# Patient Record
Sex: FEMALE | Race: WHITE | NOT HISPANIC OR LATINO | Employment: OTHER | URBAN - METROPOLITAN AREA
[De-identification: names, ages, dates, MRNs, and addresses within clinical notes are randomized per-mention and may not be internally consistent; named-entity substitution may affect disease eponyms.]

---

## 2023-10-01 DIAGNOSIS — J30.9 ALLERGIC RHINITIS, UNSPECIFIED SEASONALITY, UNSPECIFIED TRIGGER: ICD-10-CM

## 2023-10-09 RX ORDER — CETIRIZINE HYDROCHLORIDE 10 MG/1
10 TABLET ORAL DAILY PRN
Qty: 30 TABLET | Refills: 0
Start: 2023-10-09 | End: 2023-10-20 | Stop reason: SDUPTHER

## 2023-10-10 ENCOUNTER — APPOINTMENT (OUTPATIENT)
Dept: CARDIOLOGY | Facility: CLINIC | Age: 48
End: 2023-10-10
Payer: COMMERCIAL

## 2023-10-10 ENCOUNTER — APPOINTMENT (OUTPATIENT)
Dept: ALLERGY | Facility: CLINIC | Age: 48
End: 2023-10-10

## 2023-10-16 PROBLEM — M77.9 TENDONITIS: Status: ACTIVE | Noted: 2023-10-16

## 2023-10-16 PROBLEM — R53.1 FEELING WEAK: Status: ACTIVE | Noted: 2023-10-16

## 2023-10-16 PROBLEM — R73.9 HYPERGLYCEMIA: Status: ACTIVE | Noted: 2023-10-16

## 2023-10-16 PROBLEM — M77.31 HEEL SPUR, RIGHT: Status: ACTIVE | Noted: 2023-10-16

## 2023-10-16 PROBLEM — M77.32 CALCANEAL SPUR OF LEFT FOOT: Status: ACTIVE | Noted: 2023-10-16

## 2023-10-16 PROBLEM — J45.50 SEVERE PERSISTENT ASTHMA WITHOUT COMPLICATION (MULTI): Status: ACTIVE | Noted: 2023-10-16

## 2023-10-16 PROBLEM — R55 VASOVAGAL SYMPTOM: Status: ACTIVE | Noted: 2023-10-16

## 2023-10-16 PROBLEM — L98.9 ABNORMALITY, SKIN: Status: ACTIVE | Noted: 2023-10-16

## 2023-10-16 PROBLEM — R19.7 DIARRHEA: Status: ACTIVE | Noted: 2023-10-16

## 2023-10-16 PROBLEM — J45.909 ASTHMA (HHS-HCC): Status: ACTIVE | Noted: 2023-10-16

## 2023-10-16 PROBLEM — R49.0 MUSCLE TENSION DYSPHONIA: Status: ACTIVE | Noted: 2023-10-16

## 2023-10-16 PROBLEM — M77.30 CALCANEAL SPUR: Status: ACTIVE | Noted: 2023-10-16

## 2023-10-16 PROBLEM — K57.32 DIVERTICULITIS OF COLON: Status: ACTIVE | Noted: 2023-10-16

## 2023-10-16 PROBLEM — R10.13 ABDOMINAL PAIN, EPIGASTRIC: Status: ACTIVE | Noted: 2023-10-16

## 2023-10-16 PROBLEM — J06.9: Status: ACTIVE | Noted: 2023-10-16

## 2023-10-16 PROBLEM — J38.3 VOCAL CORD DYSFUNCTION: Status: ACTIVE | Noted: 2023-10-16

## 2023-10-16 PROBLEM — M79.7 FIBROMYALGIA: Status: ACTIVE | Noted: 2023-10-16

## 2023-10-16 PROBLEM — K59.09 CHRONIC CONSTIPATION: Status: ACTIVE | Noted: 2023-10-16

## 2023-10-16 PROBLEM — G47.30 SLEEP APNEA: Status: ACTIVE | Noted: 2023-10-16

## 2023-10-16 PROBLEM — G47.00 INSOMNIA: Status: ACTIVE | Noted: 2023-10-16

## 2023-10-16 PROBLEM — R10.13 DYSPEPSIA: Status: ACTIVE | Noted: 2023-10-16

## 2023-10-16 PROBLEM — E16.2 HYPOGLYCEMIA: Status: ACTIVE | Noted: 2023-10-16

## 2023-10-16 PROBLEM — G93.32 CHRONIC FATIGUE SYNDROME: Status: ACTIVE | Noted: 2023-10-16

## 2023-10-16 PROBLEM — S93.409A ANKLE SPRAIN: Status: ACTIVE | Noted: 2023-10-16

## 2023-10-16 PROBLEM — K21.00 GASTROESOPHAGEAL REFLUX DISEASE WITH ESOPHAGITIS: Status: ACTIVE | Noted: 2023-10-16

## 2023-10-16 PROBLEM — H66.92 OTITIS MEDIA OF LEFT EAR: Status: ACTIVE | Noted: 2023-10-16

## 2023-10-16 PROBLEM — K57.30 SIGMOID DIVERTICULOSIS: Status: ACTIVE | Noted: 2023-10-16

## 2023-10-16 PROBLEM — J30.9 ALLERGIC RHINITIS: Status: ACTIVE | Noted: 2023-10-16

## 2023-10-16 PROBLEM — J38.7 IRRITABLE LARYNX: Status: ACTIVE | Noted: 2023-10-16

## 2023-10-16 PROBLEM — B96.89 BACTERIAL VAGINITIS: Status: ACTIVE | Noted: 2023-10-16

## 2023-10-16 PROBLEM — N76.0 BACTERIAL VAGINITIS: Status: ACTIVE | Noted: 2023-10-16

## 2023-10-16 PROBLEM — E66.3 OVERWEIGHT: Status: ACTIVE | Noted: 2023-10-16

## 2023-10-16 PROBLEM — E78.5 HYPERLIPIDEMIA: Status: ACTIVE | Noted: 2023-10-16

## 2023-10-16 PROBLEM — K21.9 GERD (GASTROESOPHAGEAL REFLUX DISEASE): Status: ACTIVE | Noted: 2023-10-16

## 2023-10-16 PROBLEM — R19.4 CHANGE IN BOWEL HABITS: Status: ACTIVE | Noted: 2023-10-16

## 2023-10-16 PROBLEM — K44.9 HIATAL HERNIA: Status: ACTIVE | Noted: 2023-10-16

## 2023-10-16 PROBLEM — E55.9 VITAMIN D DEFICIENCY: Status: ACTIVE | Noted: 2023-10-16

## 2023-10-16 PROBLEM — M19.90 ARTHRITIS: Status: ACTIVE | Noted: 2023-10-16

## 2023-10-16 PROBLEM — R07.89 ATYPICAL CHEST PAIN: Status: ACTIVE | Noted: 2023-10-16

## 2023-10-16 PROBLEM — I87.2 VENOUS INSUFFICIENCY (CHRONIC) (PERIPHERAL): Status: ACTIVE | Noted: 2023-10-16

## 2023-10-16 PROBLEM — M79.672 PAIN OF LEFT HEEL: Status: ACTIVE | Noted: 2023-10-16

## 2023-10-16 PROBLEM — J38.3 VOCAL CORD DISEASE: Status: ACTIVE | Noted: 2023-10-16

## 2023-10-16 PROBLEM — L98.9 LESION OF NECK: Status: ACTIVE | Noted: 2023-10-16

## 2023-10-16 PROBLEM — R60.0 BILATERAL LOWER EXTREMITY EDEMA: Status: ACTIVE | Noted: 2023-10-16

## 2023-10-16 RX ORDER — TEZEPELUMAB-EKKO 210 MG/1.9ML
210 INJECTION, SOLUTION SUBCUTANEOUS
COMMUNITY
Start: 2023-09-22 | End: 2024-01-10 | Stop reason: SDUPTHER

## 2023-10-16 RX ORDER — IPRATROPIUM BROMIDE 0.5 MG/2.5ML
SOLUTION RESPIRATORY (INHALATION) EVERY 4 HOURS
Status: ON HOLD | COMMUNITY
Start: 2023-04-26 | End: 2024-01-28 | Stop reason: ENTERED-IN-ERROR

## 2023-10-16 RX ORDER — POLYETHYLENE GLYCOL 3350 17 G/17G
17 POWDER, FOR SOLUTION ORAL DAILY
COMMUNITY
Start: 2021-05-05

## 2023-10-16 RX ORDER — PHENYLPROPANOLAMINE/CLEMASTINE 75-1.34MG
1-2 TABLET, EXTENDED RELEASE ORAL EVERY 6 HOURS PRN
COMMUNITY

## 2023-10-16 RX ORDER — TIOTROPIUM BROMIDE INHALATION SPRAY 3.12 UG/1
2 SPRAY, METERED RESPIRATORY (INHALATION) DAILY
COMMUNITY
End: 2024-01-16 | Stop reason: SDUPTHER

## 2023-10-16 RX ORDER — MONTELUKAST SODIUM 10 MG/1
10 TABLET ORAL DAILY
COMMUNITY
End: 2024-01-16 | Stop reason: SDUPTHER

## 2023-10-16 RX ORDER — AMOXICILLIN AND CLAVULANATE POTASSIUM 875; 125 MG/1; MG/1
1 TABLET, FILM COATED ORAL
Status: ON HOLD | COMMUNITY
Start: 2022-12-14 | End: 2024-01-28 | Stop reason: ENTERED-IN-ERROR

## 2023-10-16 RX ORDER — LANOLIN ALCOHOL/MO/W.PET/CERES
1 CREAM (GRAM) TOPICAL DAILY
COMMUNITY

## 2023-10-16 RX ORDER — IPRATROPIUM BROMIDE 42 UG/1
2 SPRAY, METERED NASAL SEE ADMIN INSTRUCTIONS
Status: ON HOLD | COMMUNITY
Start: 2021-05-19 | End: 2024-01-28 | Stop reason: ENTERED-IN-ERROR

## 2023-10-16 RX ORDER — BENZONATATE 100 MG/1
100 CAPSULE ORAL 3 TIMES DAILY PRN
Status: ON HOLD | COMMUNITY
Start: 2022-09-10 | End: 2024-01-28 | Stop reason: ENTERED-IN-ERROR

## 2023-10-16 RX ORDER — WHEAT DEXTRIN 3 G/3.5 G
1 POWDER IN PACKET (EA) ORAL DAILY
Status: ON HOLD | COMMUNITY
Start: 2021-05-05 | End: 2024-01-28 | Stop reason: ENTERED-IN-ERROR

## 2023-10-16 RX ORDER — MOMETASONE FUROATE AND FORMOTEROL FUMARATE DIHYDRATE 200; 5 UG/1; UG/1
2 AEROSOL RESPIRATORY (INHALATION)
COMMUNITY
End: 2024-01-16 | Stop reason: SDUPTHER

## 2023-10-16 RX ORDER — PROPRANOLOL/HYDROCHLOROTHIAZID 40 MG-25MG
1 TABLET ORAL 2 TIMES DAILY
Status: ON HOLD | COMMUNITY
End: 2024-01-28 | Stop reason: ENTERED-IN-ERROR

## 2023-10-16 RX ORDER — IPRATROPIUM BROMIDE AND ALBUTEROL SULFATE 2.5; .5 MG/3ML; MG/3ML
3 SOLUTION RESPIRATORY (INHALATION) SEE ADMIN INSTRUCTIONS
COMMUNITY
End: 2024-01-16 | Stop reason: SDUPTHER

## 2023-10-16 RX ORDER — DICYCLOMINE HYDROCHLORIDE 20 MG/1
20 TABLET ORAL EVERY 6 HOURS PRN
Status: ON HOLD | COMMUNITY
End: 2024-01-28 | Stop reason: ENTERED-IN-ERROR

## 2023-10-16 RX ORDER — SUCRALFATE 1 G/10ML
10 SUSPENSION ORAL EVERY 12 HOURS PRN
Status: ON HOLD | COMMUNITY
Start: 2022-05-18 | End: 2024-01-28 | Stop reason: ENTERED-IN-ERROR

## 2023-10-16 RX ORDER — FAMOTIDINE 20 MG/1
20 TABLET, FILM COATED ORAL NIGHTLY
Status: ON HOLD | COMMUNITY
Start: 2023-08-25 | End: 2024-01-28 | Stop reason: ENTERED-IN-ERROR

## 2023-10-16 RX ORDER — PANTOPRAZOLE SODIUM 40 MG/1
40 TABLET, DELAYED RELEASE ORAL 2 TIMES DAILY
COMMUNITY
End: 2023-12-18

## 2023-10-16 RX ORDER — AMOXICILLIN 875 MG/1
875 TABLET, FILM COATED ORAL EVERY 12 HOURS
Status: ON HOLD | COMMUNITY
End: 2024-01-28 | Stop reason: ENTERED-IN-ERROR

## 2023-10-16 RX ORDER — PREDNISONE 20 MG/1
3 TABLET ORAL DAILY
COMMUNITY
End: 2023-10-20 | Stop reason: WASHOUT

## 2023-10-16 RX ORDER — PREDNISONE 50 MG/1
50 TABLET ORAL DAILY
COMMUNITY
Start: 2023-06-16 | End: 2023-10-20 | Stop reason: WASHOUT

## 2023-10-16 RX ORDER — ALBUTEROL SULFATE 0.83 MG/ML
2.5 SOLUTION RESPIRATORY (INHALATION) SEE ADMIN INSTRUCTIONS
Status: ON HOLD | COMMUNITY
Start: 2023-06-16 | End: 2024-01-28 | Stop reason: ENTERED-IN-ERROR

## 2023-10-16 RX ORDER — DICYCLOMINE HYDROCHLORIDE 20 MG/1
20 TABLET ORAL 2 TIMES DAILY
Status: ON HOLD | COMMUNITY
Start: 2020-03-05 | End: 2024-01-28 | Stop reason: ENTERED-IN-ERROR

## 2023-10-18 ENCOUNTER — CLINICAL SUPPORT (OUTPATIENT)
Dept: ALLERGY | Facility: CLINIC | Age: 48
End: 2023-10-18
Payer: COMMERCIAL

## 2023-10-18 DIAGNOSIS — J45.50 SEVERE PERSISTENT ASTHMA, UNSPECIFIED WHETHER COMPLICATED (MULTI): ICD-10-CM

## 2023-10-18 PROCEDURE — 96372 THER/PROPH/DIAG INJ SC/IM: CPT | Performed by: ALLERGY & IMMUNOLOGY

## 2023-10-20 ENCOUNTER — LAB (OUTPATIENT)
Dept: LAB | Facility: LAB | Age: 48
End: 2023-10-20
Payer: COMMERCIAL

## 2023-10-20 ENCOUNTER — APPOINTMENT (OUTPATIENT)
Dept: CARDIOLOGY | Facility: CLINIC | Age: 48
End: 2023-10-20
Payer: COMMERCIAL

## 2023-10-20 ENCOUNTER — TELEPHONE (OUTPATIENT)
Dept: PULMONOLOGY | Facility: CLINIC | Age: 48
End: 2023-10-20

## 2023-10-20 ENCOUNTER — OFFICE VISIT (OUTPATIENT)
Dept: ALLERGY | Facility: CLINIC | Age: 48
End: 2023-10-20
Payer: COMMERCIAL

## 2023-10-20 VITALS
RESPIRATION RATE: 18 BRPM | HEART RATE: 86 BPM | BODY MASS INDEX: 40.31 KG/M2 | WEIGHT: 220.4 LBS | DIASTOLIC BLOOD PRESSURE: 74 MMHG | TEMPERATURE: 97.6 F | SYSTOLIC BLOOD PRESSURE: 118 MMHG | OXYGEN SATURATION: 99 %

## 2023-10-20 DIAGNOSIS — R60.0 LOCALIZED EDEMA: ICD-10-CM

## 2023-10-20 DIAGNOSIS — J45.51 SEVERE PERSISTENT ASTHMA WITH ACUTE EXACERBATION (MULTI): Primary | ICD-10-CM

## 2023-10-20 DIAGNOSIS — R60.0 LOWER EXTREMITY EDEMA: ICD-10-CM

## 2023-10-20 DIAGNOSIS — R06.09 OTHER FORMS OF DYSPNEA: Primary | ICD-10-CM

## 2023-10-20 DIAGNOSIS — J30.9 ALLERGIC RHINITIS, UNSPECIFIED SEASONALITY, UNSPECIFIED TRIGGER: ICD-10-CM

## 2023-10-20 DIAGNOSIS — T78.3XXA ANGIOEDEMA, INITIAL ENCOUNTER: ICD-10-CM

## 2023-10-20 DIAGNOSIS — J45.50 SEVERE PERSISTENT ASTHMA WITHOUT COMPLICATION (MULTI): Primary | ICD-10-CM

## 2023-10-20 DIAGNOSIS — R06.09 OTHER FORMS OF DYSPNEA: ICD-10-CM

## 2023-10-20 LAB
BNP SERPL-MCNC: 9 PG/ML (ref 0–99)
TSH SERPL-ACNC: 1.1 MIU/L (ref 0.44–3.98)

## 2023-10-20 PROCEDURE — 84443 ASSAY THYROID STIM HORMONE: CPT

## 2023-10-20 PROCEDURE — 86160 COMPLEMENT ANTIGEN: CPT

## 2023-10-20 PROCEDURE — 86161 COMPLEMENT/FUNCTION ACTIVITY: CPT

## 2023-10-20 PROCEDURE — 83880 ASSAY OF NATRIURETIC PEPTIDE: CPT

## 2023-10-20 PROCEDURE — 36415 COLL VENOUS BLD VENIPUNCTURE: CPT

## 2023-10-20 PROCEDURE — 99214 OFFICE O/P EST MOD 30 MIN: CPT | Performed by: ALLERGY & IMMUNOLOGY

## 2023-10-20 PROCEDURE — 3008F BODY MASS INDEX DOCD: CPT | Performed by: ALLERGY & IMMUNOLOGY

## 2023-10-20 RX ORDER — CETIRIZINE HYDROCHLORIDE 10 MG/1
10 TABLET ORAL DAILY PRN
Qty: 30 TABLET | Refills: 11 | Status: SHIPPED | OUTPATIENT
Start: 2023-10-20 | End: 2024-01-16 | Stop reason: SDUPTHER

## 2023-10-20 RX ORDER — PREDNISONE 10 MG/1
TABLET ORAL
Qty: 40 EACH | Refills: 0 | Status: SHIPPED | OUTPATIENT
Start: 2023-10-20 | End: 2023-11-05

## 2023-10-20 ASSESSMENT — ENCOUNTER SYMPTOMS
ENDOCRINE NEGATIVE: 1
HEMATOLOGIC/LYMPHATIC NEGATIVE: 1
EYES NEGATIVE: 1
RESPIRATORY NEGATIVE: 1
PSYCHIATRIC NEGATIVE: 1
ABDOMINAL DISTENTION: 1
NEUROLOGICAL NEGATIVE: 1
CARDIOVASCULAR NEGATIVE: 1
CONSTITUTIONAL NEGATIVE: 1
MUSCULOSKELETAL NEGATIVE: 1

## 2023-10-20 NOTE — TELEPHONE ENCOUNTER
Patient called in asking for a refill of prednisone to be placed on hold and that her Zertec was called into the wrong pharmacy. Her pharmacy is [facility] in [place].

## 2023-10-20 NOTE — PROGRESS NOTES
PT HERE FOR TEZSPIRE INJECTION       Area cleansed with alcohol.  Subcutaneous injection performed in clean fashion. Patient tolerated well without adverse reaction.

## 2023-10-20 NOTE — PROGRESS NOTES
Subjective   Patient ID: Rema Silver is a 48 y.o. female.    Chief Complaint:  New issue-CHF symptoms. Has seen cardiology-NP at [facility].  Did ECHO  Last Tezspire was yesterday.    The issue is that she has had ankle and foot swelling all summer and as well as bloating in the belly.  The symptoms are not specifically related to her Tezspire injection.        Review of Systems   Constitutional: Negative.    HENT: Negative.     Eyes: Negative.    Respiratory: Negative.     Cardiovascular: Negative.         Edema   Gastrointestinal:  Positive for abdominal distention.   Endocrine: Negative.    Genitourinary: Negative.    Musculoskeletal: Negative.    Neurological: Negative.    Hematological: Negative.    Psychiatric/Behavioral: Negative.       Objective   Physical Exam  Vitals and nursing note reviewed.   Constitutional:       Appearance: Normal appearance.   HENT:      Right Ear: Tympanic membrane normal.      Left Ear: Tympanic membrane normal.      Nose: Nose normal.      Mouth/Throat:      Mouth: Mucous membranes are moist.   Eyes:      Conjunctiva/sclera: Conjunctivae normal.      Pupils: Pupils are equal, round, and reactive to light.   Cardiovascular:      Rate and Rhythm: Normal rate and regular rhythm.      Heart sounds: Normal heart sounds.   Pulmonary:      Effort: Pulmonary effort is normal.      Breath sounds: Normal breath sounds.   Abdominal:      General: Bowel sounds are normal.      Palpations: Abdomen is soft.   Musculoskeletal:         General: Normal range of motion.   Skin:     General: Skin is warm and dry.      Capillary Refill: Capillary refill takes less than 2 seconds.   Neurological:      General: No focal deficit present.      Mental Status: She is alert and oriented to person, place, and time.   Psychiatric:         Mood and Affect: Mood normal.     Laboratory:   Pending    Assessment/Plan    Rema is a 49 yo with a history of severe persistent asthma, previously steroid dependent who  has stabilized on Tezspire. Now with a new issue of concern is swelling of the extremities and working on rule out CHF.    We discussed this is not likely secondary to Tezspire.  It may be a result of prolonged and frequent steroid courses leading to Cardiovascular disease.  Will need to wait and see results of CHF work up. I will obtain angioedema labs in the meantime.    I will call results of labs.

## 2023-10-22 LAB — C1INH SERPL-MCNC: 31 MG/DL (ref 21–38)

## 2023-10-26 LAB — C1INH ACT/NOR SERPL: 88 %

## 2023-10-31 LAB — C1Q SERPL-MCNC: 10.3 MG/DL (ref 10.3–20.5)

## 2023-11-02 ENCOUNTER — TELEPHONE (OUTPATIENT)
Dept: ALLERGY | Facility: CLINIC | Age: 48
End: 2023-11-02

## 2023-11-08 ENCOUNTER — CLINICAL SUPPORT (OUTPATIENT)
Dept: ALLERGY | Facility: CLINIC | Age: 48
End: 2023-11-08
Payer: COMMERCIAL

## 2023-11-08 DIAGNOSIS — J45.50 SEVERE PERSISTENT ASTHMA WITHOUT COMPLICATION (MULTI): ICD-10-CM

## 2023-11-08 PROCEDURE — 96372 THER/PROPH/DIAG INJ SC/IM: CPT | Performed by: ALLERGY & IMMUNOLOGY

## 2023-11-10 ENCOUNTER — TELEPHONE (OUTPATIENT)
Dept: PULMONOLOGY | Facility: CLINIC | Age: 48
End: 2023-11-10

## 2023-11-10 DIAGNOSIS — J45.51 SEVERE PERSISTENT ASTHMA WITH ACUTE EXACERBATION (MULTI): Primary | ICD-10-CM

## 2023-11-10 RX ORDER — PREDNISONE 10 MG/1
TABLET ORAL
Qty: 30 TABLET | Refills: 0 | Status: SHIPPED | OUTPATIENT
Start: 2023-11-10 | End: 2023-11-22 | Stop reason: SDUPTHER

## 2023-11-10 NOTE — PROGRESS NOTES
Patient here for Tezspire injection         Area cleansed with alcohol.  Subcutaneous injection performed in clean fashion. Patient tolerated well without adverse reaction.

## 2023-11-16 NOTE — TELEPHONE ENCOUNTER
Patient is no better. Has been using nebs constantly. Has been on prednisone for 3 weeks. Does not feel like she needs to go to ER. DO you have any recommendations? Okay to leave detailed message.

## 2023-11-17 ENCOUNTER — APPOINTMENT (OUTPATIENT)
Dept: CARDIOLOGY | Facility: CLINIC | Age: 48
End: 2023-11-17
Payer: COMMERCIAL

## 2023-11-17 RX ORDER — AZITHROMYCIN 250 MG/1
TABLET, FILM COATED ORAL
Qty: 6 TABLET | Refills: 0 | Status: SHIPPED | OUTPATIENT
Start: 2023-11-17 | End: 2023-11-22

## 2023-11-22 DIAGNOSIS — J45.51 SEVERE PERSISTENT ASTHMA WITH ACUTE EXACERBATION (MULTI): ICD-10-CM

## 2023-11-25 RX ORDER — PREDNISONE 10 MG/1
TABLET ORAL
Qty: 30 TABLET | Refills: 0 | Status: SHIPPED | OUTPATIENT
Start: 2023-11-25 | End: 2023-12-06

## 2023-12-06 ENCOUNTER — OFFICE VISIT (OUTPATIENT)
Dept: ALLERGY | Facility: CLINIC | Age: 48
End: 2023-12-06
Payer: COMMERCIAL

## 2023-12-06 ENCOUNTER — TELEPHONE (OUTPATIENT)
Dept: PULMONOLOGY | Facility: CLINIC | Age: 48
End: 2023-12-06

## 2023-12-06 VITALS
RESPIRATION RATE: 20 BRPM | HEART RATE: 84 BPM | DIASTOLIC BLOOD PRESSURE: 78 MMHG | SYSTOLIC BLOOD PRESSURE: 124 MMHG | TEMPERATURE: 97.6 F | OXYGEN SATURATION: 98 %

## 2023-12-06 DIAGNOSIS — J45.51 SEVERE PERSISTENT ASTHMA WITH EXACERBATION (MULTI): ICD-10-CM

## 2023-12-06 DIAGNOSIS — D72.19 PERIPHERAL EOSINOPHILIA: ICD-10-CM

## 2023-12-06 DIAGNOSIS — R06.02 SHORTNESS OF BREATH: Primary | ICD-10-CM

## 2023-12-06 PROCEDURE — 96372 THER/PROPH/DIAG INJ SC/IM: CPT | Performed by: ALLERGY & IMMUNOLOGY

## 2023-12-06 PROCEDURE — 99214 OFFICE O/P EST MOD 30 MIN: CPT | Performed by: ALLERGY & IMMUNOLOGY

## 2023-12-06 PROCEDURE — 3008F BODY MASS INDEX DOCD: CPT | Performed by: ALLERGY & IMMUNOLOGY

## 2023-12-06 RX ORDER — PREDNISONE 50 MG/1
50 TABLET ORAL DAILY
Qty: 14 TABLET | Refills: 0 | Status: SHIPPED | OUTPATIENT
Start: 2023-12-06 | End: 2023-12-14 | Stop reason: SDUPTHER

## 2023-12-06 ASSESSMENT — ENCOUNTER SYMPTOMS
PSYCHIATRIC NEGATIVE: 1
DIZZINESS: 1
MUSCULOSKELETAL NEGATIVE: 1
HEMATOLOGIC/LYMPHATIC NEGATIVE: 1
EYES NEGATIVE: 1
GASTROINTESTINAL NEGATIVE: 1
CARDIOVASCULAR NEGATIVE: 1
SHORTNESS OF BREATH: 1
ENDOCRINE NEGATIVE: 1
FEVER: 0
CONSTITUTIONAL NEGATIVE: 1

## 2023-12-06 NOTE — TELEPHONE ENCOUNTER
Patient is on her way down from prednisone but she can't get away from the 30 mg tier    Patient asked if you can refill her medication and make it a regular maintenance drug.

## 2023-12-06 NOTE — PROGRESS NOTES
Subjective   Patient ID: Rema Silver is a 48 y.o. female.    Chief Complaint: Asthma exacerbation  Has had 2 zpacks from urgent care and her follow up with Dr. Valdez.  Several courses of Prednsione tapers have not been successful and  has not been able to go below 30 mg. Nebulizing q 6 hr, doubles (cannot do more frequent due to work).    Still Dulera 200 and Spiriva 2 puff in the morning.  She HAS NOT been using BID Dulera.    Recent work up for CHF was negative  No chest pain or burning.    She has been having leg swelling.    She does have a history of micro PE at the base of the right lung Nash 10, 2021.  She was on Xarelto for 3 months at the time, then cleared.    Patient has been having recurrent steroid weans over the past 6 wks with no relief.    Last antibiotic completed 2 wks ago and help, although she still has a lot of mucous drainage.      Patient here for Tezspire injection       Area cleansed with alcohol.  Subcutaneous injection performed in clean fashion. Patient tolerated well without adverse reaction.       Environmental History: No known sick exposures. No new home or work exposures.    Review of Systems   Constitutional: Negative.  Negative for fever.   HENT: Negative.     Eyes: Negative.    Respiratory:  Positive for shortness of breath.    Cardiovascular: Negative.    Gastrointestinal: Negative.    Endocrine: Negative.    Genitourinary: Negative.    Musculoskeletal: Negative.    Neurological:  Positive for dizziness.   Hematological: Negative.    Psychiatric/Behavioral: Negative.       Objective   Physical Exam  Vitals and nursing note reviewed.   Constitutional:       General: She is not in acute distress.     Appearance: Normal appearance.   HENT:      Right Ear: Tympanic membrane normal.      Left Ear: Tympanic membrane normal.      Nose: Nose normal.      Mouth/Throat:      Mouth: Mucous membranes are moist.      Comments: Thick white mucous post nasal drainage  Eyes:       Conjunctiva/sclera: Conjunctivae normal.      Pupils: Pupils are equal, round, and reactive to light.   Cardiovascular:      Rate and Rhythm: Normal rate and regular rhythm.      Heart sounds: Normal heart sounds.   Pulmonary:      Effort: Pulmonary effort is normal.      Breath sounds: No wheezing.      Comments: There is good aeration.  There are bronchial breath sounds.  Abdominal:      Palpations: Abdomen is soft.   Musculoskeletal:         General: Normal range of motion.      Cervical back: Neck supple.   Skin:     General: Skin is warm and dry.      Capillary Refill: Capillary refill takes less than 2 seconds.   Neurological:      General: No focal deficit present.      Mental Status: She is alert and oriented to person, place, and time.   Psychiatric:         Mood and Affect: Mood normal.         Behavior: Behavior normal.     Laboratory:   Labs and previous scans reviewed with patient    Assessment/Plan    Rema is a 49 yo steroid dependent asthmatic with exacerbation. Unclear trigger. History of PE.  Patient does not feel the symptoms she is having now are like what she had with her PE. I would encourage ER visit tonight if any change in symptoms, vital signs are stable at this time.  Will order additional labs and call results  Patient to do her Dulera BID, continue her Spiriva and Singulair. Will do Prednisone 50 mg.  Can use Mucinex as needed.      She is going out of town for work

## 2023-12-07 ENCOUNTER — LAB (OUTPATIENT)
Dept: LAB | Facility: LAB | Age: 48
End: 2023-12-07
Payer: COMMERCIAL

## 2023-12-07 DIAGNOSIS — D72.19 PERIPHERAL EOSINOPHILIA: ICD-10-CM

## 2023-12-07 DIAGNOSIS — R06.02 SHORTNESS OF BREATH: ICD-10-CM

## 2023-12-07 LAB
A1AT SERPL NEPH-MCNC: 117 MG/DL (ref 84–218)
D DIMER PPP FEU-MCNC: 231 NG/ML FEU

## 2023-12-07 PROCEDURE — 36415 COLL VENOUS BLD VENIPUNCTURE: CPT

## 2023-12-07 PROCEDURE — 86003 ALLG SPEC IGE CRUDE XTRC EA: CPT

## 2023-12-07 PROCEDURE — 86001 ALLERGEN SPECIFIC IGG: CPT

## 2023-12-07 PROCEDURE — 82785 ASSAY OF IGE: CPT

## 2023-12-07 PROCEDURE — 86331 IMMUNODIFFUSION OUCHTERLONY: CPT

## 2023-12-07 PROCEDURE — 85379 FIBRIN DEGRADATION QUANT: CPT

## 2023-12-07 PROCEDURE — 82103 ALPHA-1-ANTITRYPSIN TOTAL: CPT

## 2023-12-08 ENCOUNTER — TELEPHONE (OUTPATIENT)
Dept: ALLERGY | Facility: CLINIC | Age: 48
End: 2023-12-08
Payer: COMMERCIAL

## 2023-12-14 DIAGNOSIS — R06.02 SHORTNESS OF BREATH: ICD-10-CM

## 2023-12-14 RX ORDER — PREDNISONE 50 MG/1
50 TABLET ORAL DAILY
Qty: 7 TABLET | Refills: 0 | Status: SHIPPED | OUTPATIENT
Start: 2023-12-14 | End: 2023-12-21

## 2023-12-15 LAB
ASPER.FUM.MIX GEL DIFFUSION, VIRC: NEGATIVE
ASPER.FUMIGATUS IGE, VIRC: <0.1 KU/L
ASPER.FUMIGATUS IGG, VIRC: 41.1 MCG/ML
CLASS ASPER.FUMIGATUS, VIRC: 0
IGE, VIRC: 12 IU/ML (ref 5–79)

## 2023-12-16 DIAGNOSIS — K21.9 GASTROESOPHAGEAL REFLUX DISEASE WITHOUT ESOPHAGITIS: Primary | ICD-10-CM

## 2023-12-18 ENCOUNTER — APPOINTMENT (OUTPATIENT)
Dept: ALLERGY | Facility: CLINIC | Age: 48
End: 2023-12-18
Payer: COMMERCIAL

## 2023-12-18 ENCOUNTER — OFFICE VISIT (OUTPATIENT)
Dept: ALLERGY | Facility: CLINIC | Age: 48
End: 2023-12-18
Payer: COMMERCIAL

## 2023-12-18 VITALS
RESPIRATION RATE: 22 BRPM | SYSTOLIC BLOOD PRESSURE: 124 MMHG | HEART RATE: 97 BPM | TEMPERATURE: 98.5 F | BODY MASS INDEX: 40.79 KG/M2 | OXYGEN SATURATION: 94 % | DIASTOLIC BLOOD PRESSURE: 78 MMHG | WEIGHT: 223 LBS

## 2023-12-18 DIAGNOSIS — J01.11 ACUTE RECURRENT FRONTAL SINUSITIS: ICD-10-CM

## 2023-12-18 DIAGNOSIS — J45.50 SEVERE PERSISTENT ASTHMA WITHOUT COMPLICATION (MULTI): Primary | ICD-10-CM

## 2023-12-18 PROCEDURE — 3008F BODY MASS INDEX DOCD: CPT | Performed by: ALLERGY & IMMUNOLOGY

## 2023-12-18 PROCEDURE — 96372 THER/PROPH/DIAG INJ SC/IM: CPT | Performed by: ALLERGY & IMMUNOLOGY

## 2023-12-18 PROCEDURE — 99214 OFFICE O/P EST MOD 30 MIN: CPT | Performed by: ALLERGY & IMMUNOLOGY

## 2023-12-18 RX ORDER — TRIAMCINOLONE ACETONIDE 40 MG/ML
40 INJECTION, SUSPENSION INTRA-ARTICULAR; INTRAMUSCULAR ONCE
Status: COMPLETED | OUTPATIENT
Start: 2023-12-18 | End: 2023-12-18

## 2023-12-18 RX ORDER — PANTOPRAZOLE SODIUM 40 MG/1
TABLET, DELAYED RELEASE ORAL
Qty: 60 TABLET | Refills: 0 | Status: SHIPPED | OUTPATIENT
Start: 2023-12-18 | End: 2024-01-15

## 2023-12-18 RX ORDER — CEFDINIR 300 MG/1
300 CAPSULE ORAL 2 TIMES DAILY
Qty: 20 CAPSULE | Refills: 0 | Status: SHIPPED | OUTPATIENT
Start: 2023-12-18 | End: 2023-12-28

## 2023-12-18 RX ORDER — PREDNISONE 10 MG/1
TABLET ORAL
Qty: 97 TABLET | Refills: 0 | Status: SHIPPED | OUTPATIENT
Start: 2023-12-18 | End: 2024-01-16 | Stop reason: SDUPTHER

## 2023-12-18 RX ADMIN — TRIAMCINOLONE ACETONIDE 40 MG: 40 INJECTION, SUSPENSION INTRA-ARTICULAR; INTRAMUSCULAR at 11:30

## 2023-12-18 ASSESSMENT — ENCOUNTER SYMPTOMS
CARDIOVASCULAR NEGATIVE: 1
ENDOCRINE NEGATIVE: 1
PSYCHIATRIC NEGATIVE: 1
HEMATOLOGIC/LYMPHATIC NEGATIVE: 1
SHORTNESS OF BREATH: 1
MUSCULOSKELETAL NEGATIVE: 1
CONSTITUTIONAL NEGATIVE: 1
NEUROLOGICAL NEGATIVE: 1
GASTROINTESTINAL NEGATIVE: 1
EYES NEGATIVE: 1

## 2023-12-18 NOTE — PROGRESS NOTES
Subjective   Patient ID: Rema Silver is a 48 y.o. female.    Chief Complaint:  Continued complaints of asthma exacerbation despite oral steroid.  Patient does not want to be hospitalized.  She feels like this is an asthma exacerbation more than PE symptoms which we have discussed previously.  She did do clotting studies which were normal and patient does not want to do another CT SCAN. Last scan was in August and showed some mild atelectasis and no evidence of PE or adenopathy.    Has been checked for ABPA and and alpha 1 antitrypsin-negative  No family history of cystic fibrosis.    She has been on 2 rounds of Azithromycin without good effect for sinus symptoms  She does feel that her sinus symptoms have not completely cleared.    She does have a history of steroid dependent asthma, some component of VCD and GERD.  She has been on Tezspire and feels that it has helped her symptoms, but this current exacerbation may be related to her sinus drainage.  Environmental History: Recently traveled to [place] for work.    Review of Systems   Constitutional: Negative.    HENT: Negative.     Eyes: Negative.    Respiratory:  Positive for shortness of breath.    Cardiovascular: Negative.    Gastrointestinal: Negative.    Endocrine: Negative.    Genitourinary: Negative.    Musculoskeletal: Negative.    Neurological: Negative.    Hematological: Negative.    Psychiatric/Behavioral: Negative.     /78   Pulse 97   Temp 36.9 °C (98.5 °F)   Resp 22   Wt 101 kg (223 lb)   SpO2 94%   BMI 40.79 kg/m²     Objective   Physical Exam  Vitals and nursing note reviewed.   Constitutional:       Appearance: Normal appearance. She is obese.   HENT:      Right Ear: Tympanic membrane normal.      Left Ear: Tympanic membrane normal.      Nose: Nose normal.      Mouth/Throat:      Mouth: Mucous membranes are moist.   Eyes:      Conjunctiva/sclera: Conjunctivae normal.      Pupils: Pupils are equal, round, and reactive to light.    Cardiovascular:      Rate and Rhythm: Normal rate and regular rhythm.      Heart sounds: Normal heart sounds.   Pulmonary:      Effort: No respiratory distress.      Breath sounds: Normal breath sounds. No wheezing, rhonchi or rales.   Abdominal:      General: Bowel sounds are normal.      Palpations: Abdomen is soft.   Musculoskeletal:         General: Normal range of motion.      Cervical back: Neck supple.   Skin:     General: Skin is warm and dry.      Capillary Refill: Capillary refill takes less than 2 seconds.   Neurological:      General: No focal deficit present.      Mental Status: She is alert and oriented to person, place, and time.   Psychiatric:         Mood and Affect: Mood normal.     Laboratory:    Latest Reference Range & Units 12/07/23 13:48   Asper.Fum.Mix Gel Diffusion Negative  Negative   Asper.Fumigatus IgE <0.35 kU/L <0.10   Asper.Fumigatus IgG <46.0 mcg/mL 41.1     Collected: 12/07/23 1348   Result status: Final   Resulting lab: [facility]   Reference range: <=500 ng/mL FEU   Value: 231     Collected: 12/07/23 1348   Result status: Final   Resulting lab: [facility]   Reference range: 84 - 218 mg/dL   Value: 117       Assessment/Plan    47 yo with chronic sinusitis and steroid dependent asthma with continuation of asthma exacerbations symptoms. Labs did not suggest additional underlying etiology for Alpha 1 antitrypsin, ABPA or PE.  Patient declined additional imaging. Requests continuation of steroids and wean which we can do. Patient does understand risks and benefits of steroid use, which she feels she has required less of since being on Tezspire. She does feel chronic sinus symptoms are contributing to her sob symptoms.  -Will do Kenalog and wean of steroid  -Will do 10 day Cefdinir.  -Patient does see Dr. Khan in about 2 wks and will then have follow up with here in February.

## 2024-01-05 NOTE — PROGRESS NOTES
Department of Medicine  Division of Pulmonary, Critical Care, and Sleep Medicine  Follow-Up Visit  [facility] - [address], [address]    Physician HPI:  Mrs Silver is a 48-year-old female with past medical history significant for severe persistent asthma who presented to the office today for follow up.  Rema reports severe persistent symptoms over the past few weeks despite recent course of prolonged corticosteroid taper.  She was tested positive for COVID about 3 weeks ago.  Denies high-grade fevers or hypoxemia.  She has frequent cough and severe chest congestion with audible wheezing at times.  Symptoms are not quite improving with nebulized albuterol treatment.  She has been using albuterol more than 4-5 times per day.  She is currently on prednisone 10 mg daily.  She continues to use ICS/LABA/LAMA inhaler and montelukast.    Rema has history of severe persistent asthma for which she was started on Tezspire injections about 1 year ago.  This is her first severe asthma exacerbation since she was started on Tezspire.  She was noted to have mild vocal cord dysfunction on ENT exam several years ago.  Has not seen ENT in the recent past.    ROS: No fevers or chills.  No acute chest pain.  Has nasal and sinus congestion.  Cough with inhalation.  No acute GI symptoms.  No acute skin rash or inflammatory arthritis now.  No orthopnea or lower extremity edema.    Immunization History   Administered Date(s) Administered    Flu vaccine (IIV4), preservative free *Check age/dose* 09/21/2018    Influenza, Unspecified 12/04/2009    Influenza, injectable, quadrivalent 10/14/2019, 11/06/2020, 10/25/2021    Influenza, seasonal, injectable 12/05/2022    Moderna SARS-CoV-2 Vaccination 04/27/2021, 05/27/2021, 12/15/2021    Pneumococcal polysaccharide vaccine, 23-valent, age 2 years and older (PNEUMOVAX 23) 10/14/2019    Tdap vaccine, age 7 year and older (BOOSTRIX) 09/21/2018, 09/26/2019       Current Outpatient Medications    Medication Instructions    albuterol 2.5 mg, See admin instructions, Every 4-6 hours as needed for wheezing     amoxicillin (AMOXIL) 875 mg, oral, Every 12 hours    amoxicillin-pot clavulanate (Augmentin) 875-125 mg tablet 1 tablet, oral, 2 times daily with meals    benzonatate (TESSALON) 100 mg, oral, 3 times daily PRN    cetirizine (ZYRTEC) 10 mg, oral, Daily PRN    cyanocobalamin (Vitamin B-12) 1,000 mcg tablet 1 tablet, oral, Daily    dicyclomine (BENTYL) 20 mg, oral, 2 times daily    dicyclomine (BENTYL) 20 mg, oral, Every 6 hours PRN    Dulera 200-5 mcg/actuation inhaler 2 puffs, inhalation, 2 times daily RT    famotidine (PEPCID) 20 mg, oral, Nightly    HAIR, SKIN AND NAILS, BIOTIN, ORAL 4 tablets, oral, Daily    ibuprofen (Motrin) capsule 1-2 capsules, oral, Every 6 hours PRN    ipratropium (Atrovent) 0.02 % nebulizer solution nebulization, Every 4 hours, INHALE 1 VIAL USING NEBULIZER<BR>MIX WILL 1 DOSE OF ALBUTEROL FOR EACH TREATMENT<BR>    ipratropium (Atrovent) 42 mcg (0.06 %) nasal spray 2 sprays, Each Nostril, See admin instructions, 2-3 times per day     ipratropium-albuteroL (Duo-Neb) 0.5-2.5 mg/3 mL nebulizer solution 3 mL, nebulization, See admin instructions, Every 4-6 hours prn     L. acidophilus/Bifid. animalis 15.5 billion cell capsule 1 capsule, oral, Every morning    magnesium oxide (Mag-Ox) 400 mg (241.3 mg magnesium) tablet 1 tablet, oral, Daily    montelukast (SINGULAIR) 10 mg, oral, Daily    NON FORMULARY oral, DIM + bioperine<BR>Take 1  tablet daily for migraines     pamabrom 50 mg capsule 1 capsule, oral, 2 times daily    pantoprazole (ProtoNix) 40 mg EC tablet TAKE 1  BY MOUTH TWICE DAILY BEFORE 30 MINUTES BEFORE BREAKFAST AND DINNER    polyethylene glycol (GLYCOLAX, MIRALAX) 17 g, oral, Daily, MIX 1 CAPFUL (17GM) IN 8 OUNCES OF WATER, JUICE, OR TEA AND DRINK    POTASSIUM CHLORIDE ORAL 99 mg, oral, Daily    POTASSIUM CHLORIDE ORAL 2 tablets, oral, Daily, 99mg oral tablet      predniSONE (Deltasone) 10 mg tablet Take 4 tablets daily for 7 days, 3 tablets daily for 7 days, then 2 tablets daily until follow up.    Spiriva Respimat 2.5 mcg/actuation inhaler 2 puffs, inhalation, Daily    sucralfate (Carafate) 100 mg/mL suspension 10 mL, oral, Every 12 hours PRN    Tezspire SubQ syringe Inject 210 mg under the skin every 28 (twenty-eight) days. 210 mg/1.91ml subcutanous solution    turmeric-turmeric root extract 450-50 mg capsule 1 capsule, oral, 2 times daily    wheat dextrin (Benefiber Clear SF, dextrin,) 3 gram/3.5 gram powder in packet 1 Scoop, oral, Daily, Take with a full glass of water     ZINC ORAL 1 tablet, oral, Daily        Allergies:  No Known Allergies       Visit Vitals  Smoking Status Never        Physical Exam  Vitals and nursing note reviewed.   Constitutional:       General: She is not in acute distress.     Appearance: Normal appearance.   HENT:      Head: Normocephalic and atraumatic.   Eyes:      Conjunctiva/sclera: Conjunctivae normal.   Cardiovascular:      Rate and Rhythm: Normal rate and regular rhythm.   Pulmonary:      Effort: Pulmonary effort is normal. No respiratory distress.      Breath sounds: No stridor. Wheezing present. No rhonchi.   Musculoskeletal:         General: Normal range of motion.      Cervical back: Normal range of motion and neck supple.   Skin:     General: Skin is warm and dry.      Coloration: Skin is not jaundiced.   Neurological:      General: No focal deficit present.      Mental Status: She is alert and oriented to person, place, and time. Mental status is at baseline.   Psychiatric:         Mood and Affect: Mood normal.         Behavior: Behavior normal.         Judgment: Judgment normal.            Chest Radiograph     XR chest 1 view 08/13/2023    Narrative  Interpreted By:  KIRSTIN BIRMINGHAM MD  MRN: [id number]  Patient Name: SUJIT PEREA    STUDY:  CHEST 1 VIEW;  8/13/2023 3:58 pm    INDICATION:  Chest Pain .    COMPARISON:  March  25, 2023 chest CT and chest radiograph    ACCESSION NUMBER(S):  [id number]    ORDERING CLINICIAN:  HUGH FRANCISCO    FINDINGS:  AP radiograph of the chest was provided.        CARDIOMEDIASTINAL SILHOUETTE:  Cardiomediastinal silhouette is normal in size and configuration.    LUNGS:  Lungs are clear.    ABDOMEN:  No remarkable upper abdominal findings.    BONES:  No acute osseous changes.    Impression  1.  No evidence of acute cardiopulmonary process.        MACRO:  None      XR chest 1 view     Narrative  Interpreted By:  NO MARTINEZ MD  MRN: [id number]  Patient Name: SUJIT PEREA    STUDY:  CHEST 1 VIEW;  3/25/2023 11:08 am    INDICATION:  Chest Pain .    COMPARISON:  12/18/2022    ACCESSION NUMBER(S):  [id number]    ORDERING CLINICIAN:  MARY GROSSMAN    FINDINGS:  The heart is not enlarged. No infiltrate, pleural effusion or  pneumothorax is seen.    Impression  No active cardiopulmonary disease.      Echocardiogram     No results found for this or any previous visit from the past 365 days.       Chest CT Scan     CT angio chest for pulmonary embolism 08/13/2023    Narrative  Interpreted By:  EDEN CATALAN MD  MRN: [id number]  Patient Name: SUJIT PEREA    STUDY:  CT ANGIO CHEST FOR PE;  8/13/2023 5:36 pm    INDICATION:  chest pain, SOB, lower extremity swelling .    COMPARISON:  None.    ACCESSION NUMBER(S):  [id number]    ORDERING CLINICIAN:  JAMEY PIERCE    TECHNIQUE:  Contiguous axial images of the chest were obtained after the  intravenous administration of  60 mL Omnipaque 350. Coronal and  sagittal reformatted images were obtained from the axial images. MIPS  of the chest were also performed and reviewed and from the findings  of the examination.    FINDINGS:  No axillary, mediastinal, or hilar lymphadenopathy.    The heart is normal in size. No significant pericardial effusion. No  evidence of acute central, main, lobar or proximal segmental  pulmonary embolism.    Mild bilateral subsegmental  atelectasis. No significant pleural  effusion. No pneumothorax.    Limited evaluation of the upper abdomen.  Hepatic steatosis.    Multilevel degenerative change of the thoracic spine.    Impression  No evidence of acute pulmonary embolism.    No evidence of pneumonia.      CT angio chest for pulmonary embolism     Narrative  Interpreted By:  KIRSTIN BIRMINGHAM MD  MRN: [id number]  Patient Name: SUJIT PEREA    STUDY:  CT ANGIO CHEST FOR PE;  3/25/2023 1:29 pm    INDICATION:  chest pain .    COMPARISON:    Nancy 3, 2018 chest CT, January 10, 2021, March 15, 2021 and August 24, 2022 CT pulmonary angiograms.    ACCESSION NUMBER(S):  [id number]    ORDERING CLINICIAN:  MARY GORSSMAN    TECHNIQUE:  Helical data acquisition of the chest was obtained after intravenous  administration of 60 milliliterOMNIPAQUE 350, as per PE protocol.  Images were reformatted in coronal and sagittal planes. Axial and  coronal maximum intensity projection (MIP) images were created and  reviewed.    FINDINGS:  POTENTIAL LIMITATIONS OF THE STUDY: Likely technically adequate  although image degradation related to motion, and streak artifact  contributing to image heterogeneity    HEART AND VESSELS:  Pulmonary arterial heterogeneity including bandlike hypoenhancement  left lower lobe branches centrally series 402, image 109 most  compatible with. No evident definite pulmonary embolism identified.  Main pulmonary artery and its branches are normal in caliber.    The thoracic aorta normal in course and caliber.Although, the study  is not tailored for evaluation of aorta, there is no definite  evidence of acute aortic pathology.  No coronary artery calcifications are seen. Please note, the study is  not optimized for evaluation of coronary arteries.    The cardiac chambers are not enlarged.There are no findings to  suggest right heart strain.    There is no pericardial effusion seen.    MEDIASTINUM AND JOAN, LOWER NECK AND AXILLA:  The visualized  thyroid gland is within normal limits.  No evidence of thoracic lymphadenopathy by CT criteria.  Esophagus appears within normal limits as seen.    LUNGS AND AIRWAYS:  The trachea and central airways are patent. No endobronchial lesion  is seen.    Minor increased lung heterogeneity with curvilinear ground-glass  changes at the extreme bases compatible with atelectasis. No evident  consolidative pneumonia, edema, or effusion.      UPPER ABDOMEN:  The visualized subdiaphragmatic structures demonstrate no remarkable  findings.        CHEST WALL AND OSSEOUS STRUCTURES:  Chest wall is within normal limits.  No acute osseous pathology.There are no suspicious osseous  lesions.Scattered degenerative changes involving the spine appears  similar.    Impression  1. No evidence of acute pulmonary embolism.  2. Minor increased subsegmental atelectasis.       Laboratory Studies     Lab Results   Component Value Date    WBC 8.6 08/13/2023    HGB 13.2 08/13/2023    HCT 38.7 08/13/2023    MCV 90 08/13/2023     08/13/2023      Lab Results   Component Value Date    GLUCOSE 85 08/13/2023    CALCIUM 9.3 08/13/2023     08/13/2023    K 3.9 08/13/2023    CO2 25 08/13/2023     08/13/2023    BUN 16 08/13/2023    CREATININE 0.85 08/13/2023      Lab Results   Component Value Date    ALT 38 08/13/2023    AST 21 08/13/2023    ALKPHOS 58 08/13/2023    BILITOT 0.3 08/13/2023        Protime   Date/Time Value Ref Range Status   08/24/2022 12:17 PM 11.9 9.8 - 13.4 sec Final     INR   Date/Time Value Ref Range Status   08/24/2022 12:17 PM 1.0 0.9 - 1.1 Final       Immunocap IgE   Date/Time Value Ref Range Status   03/19/2022 12:51 PM 18.3 0.0 - 214.0 KU/L Final     Comment:      Note:  Omalizumab (Xolair, GenentArkadin; humanized    IgG1 antihuman IgE Fc) treatment does not    significantly interfere with the accuracy of    total IgE on the ImmunoCAP (Mtime) platform.    J Allergy Clin Immunol 2006;117:759-66).   Allergens, parasitic  diseases, smoking, and   alcohol consumption have been reported to   increase levels of total IgE in serum.       Aspergillus Fumigatus IgE   Date/Time Value Ref Range Status   03/19/2022 12:51 PM <0.10 <0.35 KU/L Final     Comment:       SEE IMMUNOCAP INTERP.IGE      Total IgG   Date/Time Value Ref Range Status   01/03/2023 10:32  700 - 1,600 mg/dL Final     Comment:     MONOCLONAL PROTEINS MAY CAUSE FALSELY LOW  RESULTS IN THIS ASSAY. SERUM PROTEIN  ELECTROPHORESIS SHOULD BE DONE AS THE  FIRST TEST TO EVALUATE MONOCLONAL GAMMOPATHY.       IgA   Date/Time Value Ref Range Status   01/03/2023 10:32  70 - 400 mg/dL Final     Comment:     MONOCLONAL PROTEINS MAY CAUSE FALSELY LOW  RESULTS IN THIS ASSAY. SERUM PROTEIN  ELECTROPHORESIS SHOULD BE DONE AS THE  FIRST TEST TO EVALUATE MONOCLONAL GAMMOPATHY.       IgM   Date/Time Value Ref Range Status   01/03/2023 10:32 AM 67 40 - 230 mg/dL Final     Comment:     MONOCLONAL PROTEINS MAY CAUSE FALSELY LOW  RESULTS IN THIS ASSAY. SERUM PROTEIN  ELECTROPHORESIS SHOULD BE DONE AS THE  FIRST TEST TO EVALUATE MONOCLONAL GAMMOPATHY.             Assessment and Plan / Recommendations     Severe persistent asthma with acute exacerbation  Recent COVID respiratory infection  GERD  History of vocal cord dysfunction    Recommend:  Will obtain CT scan of sinuses and chest given her persistent symptoms over the past 4-6 weeks  Advised to reestablish care with ENT  Increase prednisone to 20 mg daily for now until workup is completed  Continue on PPI for GERD  Can use albuterol nebs every 4 hours as needed  If symptoms worsen, I discussed with Rema that she needs to present to ER.  Based on CT scan results and ENT exam, requested might benefit from bronchial thermoplasty in the near future after acute exacerbation improved given her severe persistent symptoms despite maximized inhaler regimen and Biolog[employer].  Return to clinic in 1 week to follow up.         Mary Hanna,  MD

## 2024-01-10 ENCOUNTER — APPOINTMENT (OUTPATIENT)
Dept: ALLERGY | Facility: CLINIC | Age: 49
End: 2024-01-10
Payer: COMMERCIAL

## 2024-01-10 RX ORDER — TEZEPELUMAB-EKKO 210 MG/1.9ML
210 INJECTION, SOLUTION SUBCUTANEOUS
Qty: 1.91 ML | Refills: 11 | Status: SHIPPED | OUTPATIENT
Start: 2024-01-10 | End: 2024-02-03 | Stop reason: HOSPADM

## 2024-01-16 ENCOUNTER — OFFICE VISIT (OUTPATIENT)
Dept: PULMONOLOGY | Facility: CLINIC | Age: 49
End: 2024-01-16
Payer: COMMERCIAL

## 2024-01-16 VITALS
WEIGHT: 224 LBS | TEMPERATURE: 97.7 F | OXYGEN SATURATION: 98 % | BODY MASS INDEX: 43.98 KG/M2 | HEART RATE: 105 BPM | HEIGHT: 60 IN | SYSTOLIC BLOOD PRESSURE: 134 MMHG | DIASTOLIC BLOOD PRESSURE: 83 MMHG

## 2024-01-16 DIAGNOSIS — J30.9 ALLERGIC RHINITIS, UNSPECIFIED SEASONALITY, UNSPECIFIED TRIGGER: ICD-10-CM

## 2024-01-16 DIAGNOSIS — B37.0 ORAL THRUSH: Primary | ICD-10-CM

## 2024-01-16 DIAGNOSIS — J45.50 SEVERE PERSISTENT ASTHMA WITHOUT COMPLICATION (MULTI): ICD-10-CM

## 2024-01-16 PROCEDURE — 99215 OFFICE O/P EST HI 40 MIN: CPT | Performed by: INTERNAL MEDICINE

## 2024-01-16 PROCEDURE — 1036F TOBACCO NON-USER: CPT | Performed by: INTERNAL MEDICINE

## 2024-01-16 RX ORDER — MONTELUKAST SODIUM 10 MG/1
10 TABLET ORAL DAILY
Qty: 90 TABLET | Refills: 3 | Status: SHIPPED | OUTPATIENT
Start: 2024-01-16 | End: 2025-01-15

## 2024-01-16 RX ORDER — MOMETASONE FUROATE AND FORMOTEROL FUMARATE DIHYDRATE 200; 5 UG/1; UG/1
2 AEROSOL RESPIRATORY (INHALATION)
Qty: 13 G | Refills: 11 | Status: SHIPPED | OUTPATIENT
Start: 2024-01-16 | End: 2024-01-23 | Stop reason: ALTCHOICE

## 2024-01-16 RX ORDER — TIOTROPIUM BROMIDE INHALATION SPRAY 3.12 UG/1
2 SPRAY, METERED RESPIRATORY (INHALATION) DAILY
Qty: 1 EACH | Refills: 11 | Status: SHIPPED | OUTPATIENT
Start: 2024-01-16

## 2024-01-16 RX ORDER — IPRATROPIUM BROMIDE AND ALBUTEROL SULFATE 2.5; .5 MG/3ML; MG/3ML
3 SOLUTION RESPIRATORY (INHALATION) SEE ADMIN INSTRUCTIONS
Qty: 360 ML | Refills: 6 | Status: SHIPPED | OUTPATIENT
Start: 2024-01-16

## 2024-01-16 RX ORDER — PREDNISONE 10 MG/1
TABLET ORAL
Qty: 30 TABLET | Refills: 0 | Status: SHIPPED | OUTPATIENT
Start: 2024-01-16 | End: 2024-02-03 | Stop reason: HOSPADM

## 2024-01-16 RX ORDER — CETIRIZINE HYDROCHLORIDE 10 MG/1
10 TABLET ORAL DAILY PRN
Qty: 30 TABLET | Refills: 11 | Status: SHIPPED | OUTPATIENT
Start: 2024-01-16 | End: 2024-02-03 | Stop reason: HOSPADM

## 2024-01-16 RX ORDER — NYSTATIN 100000 [USP'U]/ML
500000 SUSPENSION ORAL 4 TIMES DAILY
Qty: 280 ML | Refills: 0 | Status: SHIPPED | OUTPATIENT
Start: 2024-01-16 | End: 2024-02-03 | Stop reason: HOSPADM

## 2024-01-16 ASSESSMENT — PATIENT HEALTH QUESTIONNAIRE - PHQ9
1. LITTLE INTEREST OR PLEASURE IN DOING THINGS: NOT AT ALL
SUM OF ALL RESPONSES TO PHQ9 QUESTIONS 1 AND 2: 0
2. FEELING DOWN, DEPRESSED OR HOPELESS: NOT AT ALL

## 2024-01-16 ASSESSMENT — PAIN SCALES - GENERAL: PAINLEVEL: 0-NO PAIN

## 2024-01-23 ENCOUNTER — OFFICE VISIT (OUTPATIENT)
Dept: PULMONOLOGY | Facility: CLINIC | Age: 49
End: 2024-01-23
Payer: COMMERCIAL

## 2024-01-23 VITALS
DIASTOLIC BLOOD PRESSURE: 77 MMHG | BODY MASS INDEX: 43.98 KG/M2 | TEMPERATURE: 97.5 F | WEIGHT: 224 LBS | HEIGHT: 60 IN | SYSTOLIC BLOOD PRESSURE: 122 MMHG | OXYGEN SATURATION: 97 % | HEART RATE: 103 BPM

## 2024-01-23 DIAGNOSIS — J45.51 SEVERE PERSISTENT ASTHMA WITH ACUTE EXACERBATION (MULTI): Primary | ICD-10-CM

## 2024-01-23 PROCEDURE — 99214 OFFICE O/P EST MOD 30 MIN: CPT | Performed by: INTERNAL MEDICINE

## 2024-01-23 PROCEDURE — 1036F TOBACCO NON-USER: CPT | Performed by: INTERNAL MEDICINE

## 2024-01-23 RX ORDER — FLUTICASONE PROPIONATE AND SALMETEROL 500; 50 UG/1; UG/1
1 POWDER RESPIRATORY (INHALATION)
Qty: 60 EACH | Refills: 11 | Status: SHIPPED | OUTPATIENT
Start: 2024-01-23 | End: 2025-01-22

## 2024-01-23 ASSESSMENT — PATIENT HEALTH QUESTIONNAIRE - PHQ9
2. FEELING DOWN, DEPRESSED OR HOPELESS: NOT AT ALL
SUM OF ALL RESPONSES TO PHQ9 QUESTIONS 1 AND 2: 0
1. LITTLE INTEREST OR PLEASURE IN DOING THINGS: NOT AT ALL

## 2024-01-23 ASSESSMENT — PAIN SCALES - GENERAL: PAINLEVEL: 0-NO PAIN

## 2024-01-24 NOTE — PROGRESS NOTES
Department of Medicine  Division of Pulmonary, Critical Care, and Sleep Medicine  Follow-Up Visit  [facility] - [address], [address]    Physician HPI:  Mrs Silver is a 48-year-old female with past medical history significant for severe persistent asthma who presented to the office today for follow up.  Rema reports severe persistent symptoms over the past few weeks despite recent course of prolonged corticosteroid taper.  She was tested positive for COVID about 3 weeks ago.  Denies high-grade fevers or hypoxemia.  She has frequent cough and severe chest congestion with audible wheezing at times.  Symptoms are not quite improving with nebulized albuterol treatment.  She has been using albuterol more than 4-5 times per day.  She is currently on prednisone 10 mg daily.  She continues to use ICS/LABA/LAMA inhaler and montelukast.    Rema has history of severe persistent asthma for which she was started on Tezspire injections about 1 year ago.  This is her first severe asthma exacerbation since she was started on Tezspire.  She was noted to have mild vocal cord dysfunction on ENT exam several years ago.  Has not seen ENT in the recent past.    ROS: No fevers or chills.  No acute chest pain.  Has nasal and sinus congestion.  Cough with inhalation.  No acute GI symptoms.  No acute skin rash or inflammatory arthritis now.  No orthopnea or lower extremity edema.    Follow up 1/23/2024:  No significant change in respiratory status since last visit last week. Feels tired and fatigued from persistent asthma symptoms and being on high dose of corticosteroids for several weeks now. No recent hx of high grade fevers. Oxygenation is stable on RA.    Immunization History   Administered Date(s) Administered    Flu vaccine (IIV4), preservative free *Check age/dose* 09/21/2018, 12/05/2022    Influenza, Unspecified 12/04/2009    Influenza, injectable, quadrivalent 10/14/2019, 11/06/2020, 10/25/2021    Influenza, seasonal,  injectable 12/04/2009, 10/21/2020, 11/03/2021, 12/05/2022    Moderna SARS-CoV-2 Vaccination 04/27/2021, 05/27/2021, 12/15/2021    Pneumococcal polysaccharide vaccine, 23-valent, age 2 years and older (PNEUMOVAX 23) 10/14/2019    Tdap vaccine, age 7 year and older (BOOSTRIX) 09/21/2018, 09/26/2019       Current Outpatient Medications   Medication Instructions    albuterol 2.5 mg, See admin instructions, Every 4-6 hours as needed for wheezing     amoxicillin (AMOXIL) 875 mg, oral, Every 12 hours    amoxicillin-pot clavulanate (Augmentin) 875-125 mg tablet 1 tablet, oral, 2 times daily with meals    benzonatate (TESSALON) 100 mg, oral, 3 times daily PRN    cetirizine (ZYRTEC) 10 mg, oral, Daily PRN    cyanocobalamin (Vitamin B-12) 1,000 mcg tablet 1 tablet, oral, Daily    dicyclomine (BENTYL) 20 mg, oral, 2 times daily    dicyclomine (BENTYL) 20 mg, oral, Every 6 hours PRN    famotidine (PEPCID) 20 mg, oral, Nightly    fluticasone propion-salmeteroL (Advair Diskus) 500-50 mcg/dose diskus inhaler 1 puff, inhalation, 2 times daily RT, Rinse mouth with water after use to reduce aftertaste and incidence of candidiasis. Do not swallow.    HAIR, SKIN AND NAILS, BIOTIN, ORAL 4 tablets, oral, Daily    ibuprofen (Motrin) capsule 1-2 capsules, oral, Every 6 hours PRN    ipratropium (Atrovent) 0.02 % nebulizer solution nebulization, Every 4 hours, INHALE 1 VIAL USING NEBULIZER<BR>MIX WILL 1 DOSE OF ALBUTEROL FOR EACH TREATMENT<BR>    ipratropium (Atrovent) 42 mcg (0.06 %) nasal spray 2 sprays, Each Nostril, See admin instructions, 2-3 times per day     ipratropium-albuteroL (Combivent Respimat)  mcg/actuation inhaler 1 puff, inhalation, 4 times daily PRN    ipratropium-albuteroL (Duo-Neb) 0.5-2.5 mg/3 mL nebulizer solution 3 mL, nebulization, See admin instructions, Every 4-6 hours prn     L. acidophilus/Bifid. animalis 15.5 billion cell capsule 1 capsule, oral, Every morning    magnesium oxide (Mag-Ox) 400 mg (241.3 mg  magnesium) tablet 1 tablet, oral, Daily    montelukast (SINGULAIR) 10 mg, oral, Daily    NON FORMULARY oral, DIM + bioperine<BR>Take 1  tablet daily for migraines     nystatin (MYCOSTATIN) 500,000 Units, oral, 4 times daily, Swish in mouth and swallow.    pamabrom 50 mg capsule 1 capsule, oral, 2 times daily    pantoprazole (ProtoNix) 40 mg EC tablet TAKE 1 TABLET BY MOUTH TWICE DAILY 30  MINUTES  BEFORE  BREAKFAST  AND  DINNER    polyethylene glycol (GLYCOLAX, MIRALAX) 17 g, oral, Daily, MIX 1 CAPFUL (17GM) IN 8 OUNCES OF WATER, JUICE, OR TEA AND DRINK    POTASSIUM CHLORIDE ORAL 99 mg, oral, Daily    POTASSIUM CHLORIDE ORAL 2 tablets, oral, Daily, 99mg oral tablet     predniSONE (Deltasone) 10 mg tablet Take 2 tablets every day for 1 week then 1 tablet daily    Spiriva Respimat 2.5 mcg/actuation inhaler 2 puffs, inhalation, Daily    sucralfate (Carafate) 100 mg/mL suspension 10 mL, oral, Every 12 hours PRN    Tezspire 210 mg, subcutaneous, Every 28 days, 210 mg/1.91ml subcutanous solution     turmeric-turmeric root extract 450-50 mg capsule 1 capsule, oral, 2 times daily    wheat dextrin (Benefiber Clear SF, dextrin,) 3 gram/3.5 gram powder in packet 1 Scoop, oral, Daily, Take with a full glass of water     ZINC ORAL 1 tablet, oral, Daily        Allergies:  Allergies   Allergen Reactions    Opioids - Morphine Analogues Other          Visit Vitals  /77   Pulse 103   Temp 36.4 °C (97.5 °F) (Temporal)   Ht 1.524 m (5')   Wt 102 kg (224 lb)   SpO2 97%   BMI 43.75 kg/m²   Smoking Status Never   BSA 2.08 m²        Physical Exam  Vitals and nursing note reviewed.   Constitutional:       General: She is not in acute distress.     Appearance: Normal appearance.   HENT:      Head: Normocephalic and atraumatic.   Eyes:      Conjunctiva/sclera: Conjunctivae normal.   Cardiovascular:      Rate and Rhythm: Normal rate and regular rhythm.   Pulmonary:      Effort: Pulmonary effort is normal. No respiratory distress.       Breath sounds: No stridor. Wheezing present. No rhonchi.   Musculoskeletal:         General: Normal range of motion.      Cervical back: Normal range of motion and neck supple.   Skin:     General: Skin is warm and dry.      Coloration: Skin is not jaundiced.   Neurological:      General: No focal deficit present.      Mental Status: She is alert and oriented to person, place, and time. Mental status is at baseline.   Psychiatric:         Mood and Affect: Mood normal.         Behavior: Behavior normal.         Judgment: Judgment normal.            Chest Radiograph     XR chest 1 view 08/13/2023    Narrative  Interpreted By:  KIRSTIN BIRMINGHAM MD  MRN: [id number]  Patient Name: SUJIT PEREA    STUDY:  CHEST 1 VIEW;  8/13/2023 3:58 pm    INDICATION:  Chest Pain .    COMPARISON:  March 25, 2023 chest CT and chest radiograph    ACCESSION NUMBER(S):  [id number]    ORDERING CLINICIAN:  HUGH FRANCISCO    FINDINGS:  AP radiograph of the chest was provided.        CARDIOMEDIASTINAL SILHOUETTE:  Cardiomediastinal silhouette is normal in size and configuration.    LUNGS:  Lungs are clear.    ABDOMEN:  No remarkable upper abdominal findings.    BONES:  No acute osseous changes.    Impression  1.  No evidence of acute cardiopulmonary process.        MACRO:  None      XR chest 1 view     Narrative  Interpreted By:  NO MARTINEZ MD  MRN: [id number]  Patient Name: SUJIT PEREA    STUDY:  CHEST 1 VIEW;  3/25/2023 11:08 am    INDICATION:  Chest Pain .    COMPARISON:  12/18/2022    ACCESSION NUMBER(S):  [id number]    ORDERING CLINICIAN:  MARY GRSOSMAN    FINDINGS:  The heart is not enlarged. No infiltrate, pleural effusion or  pneumothorax is seen.    Impression  No active cardiopulmonary disease.      Echocardiogram     No results found for this or any previous visit from the past 365 days.       Chest CT Scan     CT angio chest for pulmonary embolism 08/13/2023    Narrative  Interpreted By:  EDEN CATALAN MD  MRN:  [id number]  Patient Name: SUJIT PEREA    STUDY:  CT ANGIO CHEST FOR PE;  8/13/2023 5:36 pm    INDICATION:  chest pain, SOB, lower extremity swelling .    COMPARISON:  None.    ACCESSION NUMBER(S):  [id number]    ORDERING CLINICIAN:  JAMEY PIERCE    TECHNIQUE:  Contiguous axial images of the chest were obtained after the  intravenous administration of  60 mL Omnipaque 350. Coronal and  sagittal reformatted images were obtained from the axial images. MIPS  of the chest were also performed and reviewed and from the findings  of the examination.    FINDINGS:  No axillary, mediastinal, or hilar lymphadenopathy.    The heart is normal in size. No significant pericardial effusion. No  evidence of acute central, main, lobar or proximal segmental  pulmonary embolism.    Mild bilateral subsegmental atelectasis. No significant pleural  effusion. No pneumothorax.    Limited evaluation of the upper abdomen.  Hepatic steatosis.    Multilevel degenerative change of the thoracic spine.    Impression  No evidence of acute pulmonary embolism.    No evidence of pneumonia.      CT angio chest for pulmonary embolism     Narrative  Interpreted By:  KIRSTIN BIRMINGHAM MD  MRN: [id number]  Patient Name: SUJIT PEREA    STUDY:  CT ANGIO CHEST FOR PE;  3/25/2023 1:29 pm    INDICATION:  chest pain .    COMPARISON:    Nancy 3, 2018 chest CT, January 10, 2021, March 15, 2021 and August 24, 2022 CT pulmonary angiograms.    ACCESSION NUMBER(S):  [id number]    ORDERING CLINICIAN:  MARY GROSSMAN    TECHNIQUE:  Helical data acquisition of the chest was obtained after intravenous  administration of 60 milliliterOMNIPAQUE 350, as per PE protocol.  Images were reformatted in coronal and sagittal planes. Axial and  coronal maximum intensity projection (MIP) images were created and  reviewed.    FINDINGS:  POTENTIAL LIMITATIONS OF THE STUDY: Likely technically adequate  although image degradation related to motion, and streak artifact  contributing to  image heterogeneity    HEART AND VESSELS:  Pulmonary arterial heterogeneity including bandlike hypoenhancement  left lower lobe branches centrally series 402, image 109 most  compatible with. No evident definite pulmonary embolism identified.  Main pulmonary artery and its branches are normal in caliber.    The thoracic aorta normal in course and caliber.Although, the study  is not tailored for evaluation of aorta, there is no definite  evidence of acute aortic pathology.  No coronary artery calcifications are seen. Please note, the study is  not optimized for evaluation of coronary arteries.    The cardiac chambers are not enlarged.There are no findings to  suggest right heart strain.    There is no pericardial effusion seen.    MEDIASTINUM AND JOAN, LOWER NECK AND AXILLA:  The visualized thyroid gland is within normal limits.  No evidence of thoracic lymphadenopathy by CT criteria.  Esophagus appears within normal limits as seen.    LUNGS AND AIRWAYS:  The trachea and central airways are patent. No endobronchial lesion  is seen.    Minor increased lung heterogeneity with curvilinear ground-glass  changes at the extreme bases compatible with atelectasis. No evident  consolidative pneumonia, edema, or effusion.      UPPER ABDOMEN:  The visualized subdiaphragmatic structures demonstrate no remarkable  findings.        CHEST WALL AND OSSEOUS STRUCTURES:  Chest wall is within normal limits.  No acute osseous pathology.There are no suspicious osseous  lesions.Scattered degenerative changes involving the spine appears  similar.    Impression  1. No evidence of acute pulmonary embolism.  2. Minor increased subsegmental atelectasis.       Laboratory Studies     Lab Results   Component Value Date    WBC 8.6 08/13/2023    HGB 13.2 08/13/2023    HCT 38.7 08/13/2023    MCV 90 08/13/2023     08/13/2023      Lab Results   Component Value Date    GLUCOSE 85 08/13/2023    CALCIUM 9.3 08/13/2023     08/13/2023    K 3.9  08/13/2023    CO2 25 08/13/2023     08/13/2023    BUN 16 08/13/2023    CREATININE 0.85 08/13/2023      Lab Results   Component Value Date    ALT 38 08/13/2023    AST 21 08/13/2023    ALKPHOS 58 08/13/2023    BILITOT 0.3 08/13/2023        Protime   Date/Time Value Ref Range Status   08/24/2022 12:17 PM 11.9 9.8 - 13.4 sec Final     INR   Date/Time Value Ref Range Status   08/24/2022 12:17 PM 1.0 0.9 - 1.1 Final       Immunocap IgE   Date/Time Value Ref Range Status   03/19/2022 12:51 PM 18.3 0.0 - 214.0 KU/L Final     Comment:      Note:  Omalizumab (Xolair, GeneMortar Data; humanized    IgG1 antihuman IgE Fc) treatment does not    significantly interfere with the accuracy of    total IgE on the ImmunoCAP ([employer]) platform.    J Allergy Clin Immunol 2006;117:759-66).   Allergens, parasitic diseases, smoking, and   alcohol consumption have been reported to   increase levels of total IgE in serum.       Aspergillus Fumigatus IgE   Date/Time Value Ref Range Status   03/19/2022 12:51 PM <0.10 <0.35 KU/L Final     Comment:       SEE IMMUNOCAP INTERP.IGE      Total IgG   Date/Time Value Ref Range Status   01/03/2023 10:32  700 - 1,600 mg/dL Final     Comment:     MONOCLONAL PROTEINS MAY CAUSE FALSELY LOW  RESULTS IN THIS ASSAY. SERUM PROTEIN  ELECTROPHORESIS SHOULD BE DONE AS THE  FIRST TEST TO EVALUATE MONOCLONAL GAMMOPATHY.       IgA   Date/Time Value Ref Range Status   01/03/2023 10:32  70 - 400 mg/dL Final     Comment:     MONOCLONAL PROTEINS MAY CAUSE FALSELY LOW  RESULTS IN THIS ASSAY. SERUM PROTEIN  ELECTROPHORESIS SHOULD BE DONE AS THE  FIRST TEST TO EVALUATE MONOCLONAL GAMMOPATHY.       IgM   Date/Time Value Ref Range Status   01/03/2023 10:32 AM 67 40 - 230 mg/dL Final     Comment:     MONOCLONAL PROTEINS MAY CAUSE FALSELY LOW  RESULTS IN THIS ASSAY. SERUM PROTEIN  ELECTROPHORESIS SHOULD BE DONE AS THE  FIRST TEST TO EVALUATE MONOCLONAL GAMMOPATHY.             Assessment and Plan / Recommendations      Severe persistent asthma with acute exacerbation  Recent COVID respiratory infection  GERD  History of vocal cord dysfunction    Recommend:  CT scan of sinuses and chest given her persistent symptoms over the past 4-6 weeks  ENT exam  Will taper down the prednisone dose slowly  Continue on PPI for GERD  Can use albuterol nebs every 4 hours as needed  If symptoms worsen, I discussed with Rema that she needs to present to ER.  Based on CT scan results and ENT exam, requested might benefit from bronchial thermoplasty in the near future after acute exacerbation improved given her severe persistent symptoms despite maximized inhaler regimen and [employer].          Mary Hanna MD

## 2024-01-25 ENCOUNTER — TELEPHONE (OUTPATIENT)
Dept: PULMONOLOGY | Facility: CLINIC | Age: 49
End: 2024-01-25

## 2024-01-25 NOTE — TELEPHONE ENCOUNTER
Comvivent Respimat is not covered by insurance: Will need to try one of the following: Albuterol/ipratropium, Atrovent HFA, Breztri Aerosphere,   Budesonide-Formoterol Fumarate Dihydrate, Spiriva Respimat 2.5 mcg, Stiolto Respimat, Tiotropium bromide

## 2024-01-27 ENCOUNTER — APPOINTMENT (OUTPATIENT)
Dept: RADIOLOGY | Facility: HOSPITAL | Age: 49
DRG: 202 | End: 2024-01-27
Payer: COMMERCIAL

## 2024-01-27 ENCOUNTER — APPOINTMENT (OUTPATIENT)
Dept: CARDIOLOGY | Facility: HOSPITAL | Age: 49
DRG: 202 | End: 2024-01-27
Payer: COMMERCIAL

## 2024-01-27 ENCOUNTER — HOSPITAL ENCOUNTER (INPATIENT)
Facility: HOSPITAL | Age: 49
LOS: 6 days | Discharge: HOME | DRG: 202 | End: 2024-02-03
Attending: STUDENT IN AN ORGANIZED HEALTH CARE EDUCATION/TRAINING PROGRAM | Admitting: INTERNAL MEDICINE
Payer: COMMERCIAL

## 2024-01-27 DIAGNOSIS — J45.51 SEVERE PERSISTENT ASTHMA WITH EXACERBATION (MULTI): Primary | ICD-10-CM

## 2024-01-27 DIAGNOSIS — J30.9 ALLERGIC RHINITIS, UNSPECIFIED SEASONALITY, UNSPECIFIED TRIGGER: ICD-10-CM

## 2024-01-27 DIAGNOSIS — J01.11 ACUTE RECURRENT FRONTAL SINUSITIS: ICD-10-CM

## 2024-01-27 DIAGNOSIS — R06.02 SHORTNESS OF BREATH: ICD-10-CM

## 2024-01-27 DIAGNOSIS — D72.19 PERIPHERAL EOSINOPHILIA: ICD-10-CM

## 2024-01-27 DIAGNOSIS — J45.50 SEVERE PERSISTENT ASTHMA WITHOUT COMPLICATION (MULTI): ICD-10-CM

## 2024-01-27 LAB
ALBUMIN SERPL BCP-MCNC: 4.8 G/DL (ref 3.4–5)
ALP SERPL-CCNC: 43 U/L (ref 33–110)
ALT SERPL W P-5'-P-CCNC: 39 U/L (ref 7–45)
ANION GAP SERPL CALC-SCNC: 15 MMOL/L (ref 10–20)
AST SERPL W P-5'-P-CCNC: 45 U/L (ref 9–39)
BASOPHILS # BLD AUTO: 0.03 X10*3/UL (ref 0–0.1)
BASOPHILS NFR BLD AUTO: 0.2 %
BILIRUB SERPL-MCNC: 0.5 MG/DL (ref 0–1.2)
BNP SERPL-MCNC: 12 PG/ML (ref 0–99)
BUN SERPL-MCNC: 16 MG/DL (ref 6–23)
CALCIUM SERPL-MCNC: 9.8 MG/DL (ref 8.6–10.3)
CARDIAC TROPONIN I PNL SERPL HS: 3 NG/L (ref 0–13)
CHLORIDE SERPL-SCNC: 103 MMOL/L (ref 98–107)
CO2 SERPL-SCNC: 22 MMOL/L (ref 21–32)
CREAT SERPL-MCNC: 0.74 MG/DL (ref 0.5–1.05)
D DIMER PPP FEU-MCNC: 313 NG/ML FEU
EGFRCR SERPLBLD CKD-EPI 2021: >90 ML/MIN/1.73M*2
EOSINOPHIL # BLD AUTO: 0.01 X10*3/UL (ref 0–0.7)
EOSINOPHIL NFR BLD AUTO: 0.1 %
ERYTHROCYTE [DISTWIDTH] IN BLOOD BY AUTOMATED COUNT: 13.1 % (ref 11.5–14.5)
GLUCOSE SERPL-MCNC: 111 MG/DL (ref 74–99)
HCT VFR BLD AUTO: 41.8 % (ref 36–46)
HGB BLD-MCNC: 14 G/DL (ref 12–16)
IMM GRANULOCYTES # BLD AUTO: 0.09 X10*3/UL (ref 0–0.7)
IMM GRANULOCYTES NFR BLD AUTO: 0.7 % (ref 0–0.9)
LYMPHOCYTES # BLD AUTO: 2.95 X10*3/UL (ref 1.2–4.8)
LYMPHOCYTES NFR BLD AUTO: 22 %
MCH RBC QN AUTO: 30.6 PG (ref 26–34)
MCHC RBC AUTO-ENTMCNC: 33.5 G/DL (ref 32–36)
MCV RBC AUTO: 91 FL (ref 80–100)
MONOCYTES # BLD AUTO: 0.64 X10*3/UL (ref 0.1–1)
MONOCYTES NFR BLD AUTO: 4.8 %
NEUTROPHILS # BLD AUTO: 9.66 X10*3/UL (ref 1.2–7.7)
NEUTROPHILS NFR BLD AUTO: 72.2 %
NRBC BLD-RTO: 0 /100 WBCS (ref 0–0)
PLATELET # BLD AUTO: 316 X10*3/UL (ref 150–450)
POTASSIUM SERPL-SCNC: 5 MMOL/L (ref 3.5–5.3)
PROT SERPL-MCNC: 7.6 G/DL (ref 6.4–8.2)
RBC # BLD AUTO: 4.58 X10*6/UL (ref 4–5.2)
SODIUM SERPL-SCNC: 135 MMOL/L (ref 136–145)
WBC # BLD AUTO: 13.4 X10*3/UL (ref 4.4–11.3)

## 2024-01-27 PROCEDURE — 94640 AIRWAY INHALATION TREATMENT: CPT

## 2024-01-27 PROCEDURE — 2500000004 HC RX 250 GENERAL PHARMACY W/ HCPCS (ALT 636 FOR OP/ED)

## 2024-01-27 PROCEDURE — 84484 ASSAY OF TROPONIN QUANT: CPT | Performed by: STUDENT IN AN ORGANIZED HEALTH CARE EDUCATION/TRAINING PROGRAM

## 2024-01-27 PROCEDURE — 93005 ELECTROCARDIOGRAM TRACING: CPT

## 2024-01-27 PROCEDURE — 36415 COLL VENOUS BLD VENIPUNCTURE: CPT | Performed by: STUDENT IN AN ORGANIZED HEALTH CARE EDUCATION/TRAINING PROGRAM

## 2024-01-27 PROCEDURE — 70487 CT MAXILLOFACIAL W/DYE: CPT

## 2024-01-27 PROCEDURE — 83880 ASSAY OF NATRIURETIC PEPTIDE: CPT | Performed by: STUDENT IN AN ORGANIZED HEALTH CARE EDUCATION/TRAINING PROGRAM

## 2024-01-27 PROCEDURE — 85379 FIBRIN DEGRADATION QUANT: CPT | Performed by: STUDENT IN AN ORGANIZED HEALTH CARE EDUCATION/TRAINING PROGRAM

## 2024-01-27 PROCEDURE — 2550000001 HC RX 255 CONTRASTS: Performed by: STUDENT IN AN ORGANIZED HEALTH CARE EDUCATION/TRAINING PROGRAM

## 2024-01-27 PROCEDURE — 80053 COMPREHEN METABOLIC PANEL: CPT | Performed by: STUDENT IN AN ORGANIZED HEALTH CARE EDUCATION/TRAINING PROGRAM

## 2024-01-27 PROCEDURE — 71275 CT ANGIOGRAPHY CHEST: CPT | Performed by: RADIOLOGY

## 2024-01-27 PROCEDURE — 70486 CT MAXILLOFACIAL W/O DYE: CPT | Performed by: RADIOLOGY

## 2024-01-27 PROCEDURE — 71045 X-RAY EXAM CHEST 1 VIEW: CPT

## 2024-01-27 PROCEDURE — 85025 COMPLETE CBC W/AUTO DIFF WBC: CPT | Performed by: STUDENT IN AN ORGANIZED HEALTH CARE EDUCATION/TRAINING PROGRAM

## 2024-01-27 PROCEDURE — 71275 CT ANGIOGRAPHY CHEST: CPT

## 2024-01-27 PROCEDURE — 99223 1ST HOSP IP/OBS HIGH 75: CPT | Performed by: STUDENT IN AN ORGANIZED HEALTH CARE EDUCATION/TRAINING PROGRAM

## 2024-01-27 PROCEDURE — 99285 EMERGENCY DEPT VISIT HI MDM: CPT | Performed by: STUDENT IN AN ORGANIZED HEALTH CARE EDUCATION/TRAINING PROGRAM

## 2024-01-27 PROCEDURE — 2500000002 HC RX 250 W HCPCS SELF ADMINISTERED DRUGS (ALT 637 FOR MEDICARE OP, ALT 636 FOR OP/ED)

## 2024-01-27 PROCEDURE — 96374 THER/PROPH/DIAG INJ IV PUSH: CPT

## 2024-01-27 PROCEDURE — 2500000001 HC RX 250 WO HCPCS SELF ADMINISTERED DRUGS (ALT 637 FOR MEDICARE OP)

## 2024-01-27 PROCEDURE — 71045 X-RAY EXAM CHEST 1 VIEW: CPT | Performed by: RADIOLOGY

## 2024-01-27 RX ORDER — IPRATROPIUM BROMIDE AND ALBUTEROL SULFATE 2.5; .5 MG/3ML; MG/3ML
3 SOLUTION RESPIRATORY (INHALATION) EVERY 2 HOUR PRN
Status: DISCONTINUED | OUTPATIENT
Start: 2024-01-27 | End: 2024-02-03 | Stop reason: HOSPADM

## 2024-01-27 RX ORDER — PREDNISONE 20 MG/1
40 TABLET ORAL DAILY
Status: DISCONTINUED | OUTPATIENT
Start: 2024-01-28 | End: 2024-01-27

## 2024-01-27 RX ORDER — PANTOPRAZOLE SODIUM 40 MG/1
40 TABLET, DELAYED RELEASE ORAL
Status: DISCONTINUED | OUTPATIENT
Start: 2024-01-28 | End: 2024-02-03 | Stop reason: HOSPADM

## 2024-01-27 RX ORDER — POLYETHYLENE GLYCOL 3350 17 G/17G
17 POWDER, FOR SOLUTION ORAL 2 TIMES DAILY
Status: DISCONTINUED | OUTPATIENT
Start: 2024-01-27 | End: 2024-02-03 | Stop reason: HOSPADM

## 2024-01-27 RX ORDER — NYSTATIN 100000 [USP'U]/ML
5 SUSPENSION ORAL 4 TIMES DAILY
Status: DISCONTINUED | OUTPATIENT
Start: 2024-01-28 | End: 2024-01-28

## 2024-01-27 RX ORDER — ENOXAPARIN SODIUM 100 MG/ML
40 INJECTION SUBCUTANEOUS EVERY 12 HOURS SCHEDULED
Status: DISCONTINUED | OUTPATIENT
Start: 2024-01-27 | End: 2024-02-03 | Stop reason: HOSPADM

## 2024-01-27 RX ORDER — IPRATROPIUM BROMIDE AND ALBUTEROL SULFATE 2.5; .5 MG/3ML; MG/3ML
3 SOLUTION RESPIRATORY (INHALATION)
Status: DISCONTINUED | OUTPATIENT
Start: 2024-01-28 | End: 2024-01-28

## 2024-01-27 RX ORDER — MONTELUKAST SODIUM 10 MG/1
10 TABLET ORAL NIGHTLY
Status: DISCONTINUED | OUTPATIENT
Start: 2024-01-27 | End: 2024-02-03 | Stop reason: HOSPADM

## 2024-01-27 RX ORDER — IPRATROPIUM BROMIDE AND ALBUTEROL SULFATE 2.5; .5 MG/3ML; MG/3ML
3 SOLUTION RESPIRATORY (INHALATION)
Status: DISCONTINUED | OUTPATIENT
Start: 2024-01-27 | End: 2024-01-27

## 2024-01-27 RX ADMIN — IPRATROPIUM BROMIDE AND ALBUTEROL SULFATE 3 ML: .5; 3 SOLUTION RESPIRATORY (INHALATION) at 19:51

## 2024-01-27 RX ADMIN — IOHEXOL 90 ML: 350 INJECTION, SOLUTION INTRAVENOUS at 20:56

## 2024-01-27 RX ADMIN — METHYLPREDNISOLONE SODIUM SUCCINATE 125 MG: 125 INJECTION, POWDER, FOR SOLUTION INTRAMUSCULAR; INTRAVENOUS at 18:47

## 2024-01-27 RX ADMIN — POLYETHYLENE GLYCOL 3350 17 G: 17 POWDER, FOR SOLUTION ORAL at 22:45

## 2024-01-27 RX ADMIN — MONTELUKAST 10 MG: 10 TABLET, FILM COATED ORAL at 23:55

## 2024-01-27 ASSESSMENT — ENCOUNTER SYMPTOMS
NEUROLOGICAL NEGATIVE: 1
HEMATOLOGIC/LYMPHATIC NEGATIVE: 1
SHORTNESS OF BREATH: 1
FATIGUE: 1
CHEST TIGHTNESS: 0
EYES NEGATIVE: 1
MUSCULOSKELETAL NEGATIVE: 1
ENDOCRINE NEGATIVE: 1
FEVER: 0
CHILLS: 0
PSYCHIATRIC NEGATIVE: 1
WHEEZING: 0
NAUSEA: 0
CONSTIPATION: 1
DIARRHEA: 0
VOMITING: 0
BLOOD IN STOOL: 0
ALLERGIC/IMMUNOLOGIC NEGATIVE: 1

## 2024-01-27 ASSESSMENT — LIFESTYLE VARIABLES
EVER FELT BAD OR GUILTY ABOUT YOUR DRINKING: NO
HAVE PEOPLE ANNOYED YOU BY CRITICIZING YOUR DRINKING: NO
HAVE YOU EVER FELT YOU SHOULD CUT DOWN ON YOUR DRINKING: NO
EVER HAD A DRINK FIRST THING IN THE MORNING TO STEADY YOUR NERVES TO GET RID OF A HANGOVER: NO
REASON UNABLE TO ASSESS: NO

## 2024-01-27 ASSESSMENT — COLUMBIA-SUICIDE SEVERITY RATING SCALE - C-SSRS
2. HAVE YOU ACTUALLY HAD ANY THOUGHTS OF KILLING YOURSELF?: NO
6. HAVE YOU EVER DONE ANYTHING, STARTED TO DO ANYTHING, OR PREPARED TO DO ANYTHING TO END YOUR LIFE?: NO
1. IN THE PAST MONTH, HAVE YOU WISHED YOU WERE DEAD OR WISHED YOU COULD GO TO SLEEP AND NOT WAKE UP?: NO

## 2024-01-27 ASSESSMENT — PAIN DESCRIPTION - PAIN TYPE: TYPE: ACUTE PAIN

## 2024-01-27 ASSESSMENT — PAIN - FUNCTIONAL ASSESSMENT: PAIN_FUNCTIONAL_ASSESSMENT: 0-10

## 2024-01-27 ASSESSMENT — PAIN SCALES - GENERAL: PAINLEVEL_OUTOF10: 5 - MODERATE PAIN

## 2024-01-28 LAB
ALBUMIN SERPL BCP-MCNC: 4.5 G/DL (ref 3.4–5)
ANION GAP SERPL CALC-SCNC: 16 MMOL/L (ref 10–20)
BUN SERPL-MCNC: 14 MG/DL (ref 6–23)
CALCIUM SERPL-MCNC: 9.8 MG/DL (ref 8.6–10.3)
CHLORIDE SERPL-SCNC: 104 MMOL/L (ref 98–107)
CO2 SERPL-SCNC: 20 MMOL/L (ref 21–32)
CREAT SERPL-MCNC: 0.62 MG/DL (ref 0.5–1.05)
EGFRCR SERPLBLD CKD-EPI 2021: >90 ML/MIN/1.73M*2
ERYTHROCYTE [DISTWIDTH] IN BLOOD BY AUTOMATED COUNT: 13.1 % (ref 11.5–14.5)
GLUCOSE SERPL-MCNC: 122 MG/DL (ref 74–99)
HCT VFR BLD AUTO: 41.4 % (ref 36–46)
HGB BLD-MCNC: 13.4 G/DL (ref 12–16)
MAGNESIUM SERPL-MCNC: 2.1 MG/DL (ref 1.6–2.4)
MCH RBC QN AUTO: 30 PG (ref 26–34)
MCHC RBC AUTO-ENTMCNC: 32.4 G/DL (ref 32–36)
MCV RBC AUTO: 93 FL (ref 80–100)
NRBC BLD-RTO: 0 /100 WBCS (ref 0–0)
PHOSPHATE SERPL-MCNC: 2.7 MG/DL (ref 2.5–4.9)
PLATELET # BLD AUTO: 304 X10*3/UL (ref 150–450)
POTASSIUM SERPL-SCNC: 4.1 MMOL/L (ref 3.5–5.3)
RBC # BLD AUTO: 4.47 X10*6/UL (ref 4–5.2)
SODIUM SERPL-SCNC: 136 MMOL/L (ref 136–145)
WBC # BLD AUTO: 17.2 X10*3/UL (ref 4.4–11.3)

## 2024-01-28 PROCEDURE — 99223 1ST HOSP IP/OBS HIGH 75: CPT | Performed by: INTERNAL MEDICINE

## 2024-01-28 PROCEDURE — 2500000002 HC RX 250 W HCPCS SELF ADMINISTERED DRUGS (ALT 637 FOR MEDICARE OP, ALT 636 FOR OP/ED)

## 2024-01-28 PROCEDURE — 2500000004 HC RX 250 GENERAL PHARMACY W/ HCPCS (ALT 636 FOR OP/ED): Performed by: INTERNAL MEDICINE

## 2024-01-28 PROCEDURE — 2500000001 HC RX 250 WO HCPCS SELF ADMINISTERED DRUGS (ALT 637 FOR MEDICARE OP)

## 2024-01-28 PROCEDURE — 2500000002 HC RX 250 W HCPCS SELF ADMINISTERED DRUGS (ALT 637 FOR MEDICARE OP, ALT 636 FOR OP/ED): Performed by: STUDENT IN AN ORGANIZED HEALTH CARE EDUCATION/TRAINING PROGRAM

## 2024-01-28 PROCEDURE — 94640 AIRWAY INHALATION TREATMENT: CPT

## 2024-01-28 PROCEDURE — 2500000004 HC RX 250 GENERAL PHARMACY W/ HCPCS (ALT 636 FOR OP/ED)

## 2024-01-28 PROCEDURE — 83735 ASSAY OF MAGNESIUM: CPT

## 2024-01-28 PROCEDURE — 36415 COLL VENOUS BLD VENIPUNCTURE: CPT

## 2024-01-28 PROCEDURE — 1100000001 HC PRIVATE ROOM DAILY

## 2024-01-28 PROCEDURE — 85027 COMPLETE CBC AUTOMATED: CPT

## 2024-01-28 PROCEDURE — 82947 ASSAY GLUCOSE BLOOD QUANT: CPT

## 2024-01-28 PROCEDURE — 2500000002 HC RX 250 W HCPCS SELF ADMINISTERED DRUGS (ALT 637 FOR MEDICARE OP, ALT 636 FOR OP/ED): Performed by: INTERNAL MEDICINE

## 2024-01-28 PROCEDURE — 2500000004 HC RX 250 GENERAL PHARMACY W/ HCPCS (ALT 636 FOR OP/ED): Performed by: STUDENT IN AN ORGANIZED HEALTH CARE EDUCATION/TRAINING PROGRAM

## 2024-01-28 RX ORDER — FLUTICASONE PROPIONATE AND SALMETEROL 500; 50 UG/1; UG/1
1 POWDER RESPIRATORY (INHALATION)
Status: DISCONTINUED | OUTPATIENT
Start: 2024-01-28 | End: 2024-01-28

## 2024-01-28 RX ORDER — IPRATROPIUM BROMIDE AND ALBUTEROL SULFATE 2.5; .5 MG/3ML; MG/3ML
3 SOLUTION RESPIRATORY (INHALATION)
Status: DISCONTINUED | OUTPATIENT
Start: 2024-01-28 | End: 2024-01-31

## 2024-01-28 RX ORDER — FORMOTEROL FUMARATE DIHYDRATE 20 UG/2ML
20 SOLUTION RESPIRATORY (INHALATION)
Status: DISCONTINUED | OUTPATIENT
Start: 2024-01-28 | End: 2024-02-03 | Stop reason: HOSPADM

## 2024-01-28 RX ORDER — BUDESONIDE 0.5 MG/2ML
0.5 INHALANT ORAL
Status: DISCONTINUED | OUTPATIENT
Start: 2024-01-28 | End: 2024-02-03 | Stop reason: HOSPADM

## 2024-01-28 RX ORDER — LANOLIN ALCOHOL/MO/W.PET/CERES
400 CREAM (GRAM) TOPICAL DAILY
Status: DISCONTINUED | OUTPATIENT
Start: 2024-01-28 | End: 2024-02-03 | Stop reason: HOSPADM

## 2024-01-28 RX ORDER — CETIRIZINE HYDROCHLORIDE 10 MG/1
10 TABLET ORAL DAILY PRN
Status: DISCONTINUED | OUTPATIENT
Start: 2024-01-28 | End: 2024-02-03 | Stop reason: HOSPADM

## 2024-01-28 RX ORDER — NYSTATIN 100000 [USP'U]/ML
500000 SUSPENSION ORAL 4 TIMES DAILY
Status: DISCONTINUED | OUTPATIENT
Start: 2024-01-28 | End: 2024-02-03 | Stop reason: HOSPADM

## 2024-01-28 RX ORDER — MOMETASONE FUROATE AND FORMOTEROL FUMARATE DIHYDRATE 200; 5 UG/1; UG/1
2 AEROSOL RESPIRATORY (INHALATION)
COMMUNITY
End: 2024-05-21 | Stop reason: ALTCHOICE

## 2024-01-28 RX ADMIN — NYSTATIN 500000 UNITS: 100000 SUSPENSION ORAL at 22:51

## 2024-01-28 RX ADMIN — NYSTATIN 500000 UNITS: 100000 SUSPENSION ORAL at 17:45

## 2024-01-28 RX ADMIN — METHYLPREDNISOLONE SODIUM SUCCINATE 40 MG: 40 INJECTION, POWDER, FOR SOLUTION INTRAMUSCULAR; INTRAVENOUS at 22:51

## 2024-01-28 RX ADMIN — IPRATROPIUM BROMIDE AND ALBUTEROL SULFATE 3 ML: 2.5; .5 SOLUTION RESPIRATORY (INHALATION) at 13:42

## 2024-01-28 RX ADMIN — PREDNISONE 60 MG: 10 TABLET ORAL at 09:41

## 2024-01-28 RX ADMIN — FORMOTEROL FUMARATE 20 MCG: 20 SOLUTION RESPIRATORY (INHALATION) at 18:00

## 2024-01-28 RX ADMIN — IPRATROPIUM BROMIDE AND ALBUTEROL SULFATE 3 ML: 2.5; .5 SOLUTION RESPIRATORY (INHALATION) at 17:59

## 2024-01-28 RX ADMIN — IPRATROPIUM BROMIDE AND ALBUTEROL SULFATE 3 ML: 2.5; .5 SOLUTION RESPIRATORY (INHALATION) at 00:30

## 2024-01-28 RX ADMIN — NYSTATIN 500000 UNITS: 100000 SUSPENSION ORAL at 07:01

## 2024-01-28 RX ADMIN — IPRATROPIUM BROMIDE AND ALBUTEROL SULFATE 3 ML: 2.5; .5 SOLUTION RESPIRATORY (INHALATION) at 12:54

## 2024-01-28 RX ADMIN — POLYETHYLENE GLYCOL 3350 17 G: 17 POWDER, FOR SOLUTION ORAL at 22:50

## 2024-01-28 RX ADMIN — MONTELUKAST 10 MG: 10 TABLET, FILM COATED ORAL at 22:51

## 2024-01-28 RX ADMIN — NYSTATIN 500000 UNITS: 100000 SUSPENSION ORAL at 12:51

## 2024-01-28 RX ADMIN — IPRATROPIUM BROMIDE AND ALBUTEROL SULFATE 3 ML: 2.5; .5 SOLUTION RESPIRATORY (INHALATION) at 09:21

## 2024-01-28 RX ADMIN — MAGNESIUM OXIDE TAB 400 MG (241.3 MG ELEMENTAL MG) 400 MG: 400 (241.3 MG) TAB at 14:48

## 2024-01-28 RX ADMIN — PANTOPRAZOLE SODIUM 40 MG: 40 TABLET, DELAYED RELEASE ORAL at 07:00

## 2024-01-28 RX ADMIN — POLYETHYLENE GLYCOL 3350 17 G: 17 POWDER, FOR SOLUTION ORAL at 09:41

## 2024-01-28 RX ADMIN — BUDESONIDE INHALATION 0.5 MG: 0.5 SUSPENSION RESPIRATORY (INHALATION) at 18:01

## 2024-01-28 SDOH — SOCIAL STABILITY: SOCIAL INSECURITY: ABUSE: ADULT

## 2024-01-28 SDOH — SOCIAL STABILITY: SOCIAL INSECURITY: DO YOU FEEL ANYONE HAS EXPLOITED OR TAKEN ADVANTAGE OF YOU FINANCIALLY OR OF YOUR PERSONAL PROPERTY?: NO

## 2024-01-28 SDOH — SOCIAL STABILITY: SOCIAL INSECURITY: DO YOU FEEL UNSAFE GOING BACK TO THE PLACE WHERE YOU ARE LIVING?: NO

## 2024-01-28 SDOH — SOCIAL STABILITY: SOCIAL INSECURITY: WERE YOU ABLE TO COMPLETE ALL THE BEHAVIORAL HEALTH SCREENINGS?: YES

## 2024-01-28 SDOH — SOCIAL STABILITY: SOCIAL INSECURITY: DOES ANYONE TRY TO KEEP YOU FROM HAVING/CONTACTING OTHER FRIENDS OR DOING THINGS OUTSIDE YOUR HOME?: NO

## 2024-01-28 SDOH — SOCIAL STABILITY: SOCIAL INSECURITY: ARE THERE ANY APPARENT SIGNS OF INJURIES/BEHAVIORS THAT COULD BE RELATED TO ABUSE/NEGLECT?: NO

## 2024-01-28 SDOH — SOCIAL STABILITY: SOCIAL INSECURITY: HAVE YOU HAD THOUGHTS OF HARMING ANYONE ELSE?: NO

## 2024-01-28 SDOH — SOCIAL STABILITY: SOCIAL INSECURITY: ARE YOU OR HAVE YOU BEEN THREATENED OR ABUSED PHYSICALLY, EMOTIONALLY, OR SEXUALLY BY ANYONE?: NO

## 2024-01-28 SDOH — SOCIAL STABILITY: SOCIAL INSECURITY: HAS ANYONE EVER THREATENED TO HURT YOUR FAMILY OR YOUR PETS?: NO

## 2024-01-28 ASSESSMENT — COGNITIVE AND FUNCTIONAL STATUS - GENERAL
DAILY ACTIVITIY SCORE: 24
DAILY ACTIVITIY SCORE: 24
PATIENT BASELINE BEDBOUND: NO
MOBILITY SCORE: 24
MOBILITY SCORE: 24

## 2024-01-28 ASSESSMENT — ACTIVITIES OF DAILY LIVING (ADL)
TOILETING: INDEPENDENT
ADEQUATE_TO_COMPLETE_ADL: YES
GROOMING: INDEPENDENT
DRESSING YOURSELF: INDEPENDENT
PATIENT'S MEMORY ADEQUATE TO SAFELY COMPLETE DAILY ACTIVITIES?: YES
JUDGMENT_ADEQUATE_SAFELY_COMPLETE_DAILY_ACTIVITIES: YES
FEEDING YOURSELF: INDEPENDENT
LACK_OF_TRANSPORTATION: NO
WALKS IN HOME: INDEPENDENT
BATHING: INDEPENDENT
HEARING - LEFT EAR: FUNCTIONAL
HEARING - RIGHT EAR: FUNCTIONAL

## 2024-01-28 ASSESSMENT — PATIENT HEALTH QUESTIONNAIRE - PHQ9
1. LITTLE INTEREST OR PLEASURE IN DOING THINGS: NOT AT ALL
2. FEELING DOWN, DEPRESSED OR HOPELESS: NOT AT ALL
2. FEELING DOWN, DEPRESSED OR HOPELESS: NOT AT ALL
SUM OF ALL RESPONSES TO PHQ9 QUESTIONS 1 & 2: 0
SUM OF ALL RESPONSES TO PHQ9 QUESTIONS 1 & 2: 0
1. LITTLE INTEREST OR PLEASURE IN DOING THINGS: NOT AT ALL

## 2024-01-28 ASSESSMENT — LIFESTYLE VARIABLES
HOW MANY STANDARD DRINKS CONTAINING ALCOHOL DO YOU HAVE ON A TYPICAL DAY: PATIENT DOES NOT DRINK
AUDIT-C TOTAL SCORE: 0
SUBSTANCE_ABUSE_PAST_12_MONTHS: NO
HOW OFTEN DO YOU HAVE A DRINK CONTAINING ALCOHOL: NEVER
AUDIT-C TOTAL SCORE: 0
SKIP TO QUESTIONS 9-10: 1
PRESCIPTION_ABUSE_PAST_12_MONTHS: NO
HOW OFTEN DO YOU HAVE 6 OR MORE DRINKS ON ONE OCCASION: NEVER

## 2024-01-28 ASSESSMENT — PAIN SCALES - GENERAL
PAINLEVEL_OUTOF10: 0 - NO PAIN

## 2024-01-28 ASSESSMENT — PAIN - FUNCTIONAL ASSESSMENT
PAIN_FUNCTIONAL_ASSESSMENT: 0-10
PAIN_FUNCTIONAL_ASSESSMENT: 0-10

## 2024-01-28 NOTE — CARE PLAN
Problem: Pain  Goal: My pain/discomfort is manageable  Outcome: Progressing     Problem: Safety  Goal: Patient will be injury free during hospitalization  Outcome: Progressing  Goal: I will remain free of falls  Outcome: Progressing     Problem: Daily Care  Goal: Daily care needs are met  Outcome: Progressing     Problem: Psychosocial Needs  Goal: Demonstrates ability to cope with hospitalization/illness  Outcome: Progressing  Goal: Collaborate with me, my family, and caregiver to identify my specific goals  Outcome: Progressing  Flowsheets (Taken 1/28/2024 [id number])  Cultural Requests During Hospitalization: n/a  Spiritual Requests During Hospitalization: n/a     Problem: Discharge Barriers  Goal: My discharge needs are met  Outcome: Progressing     Problem: Fall/Injury  Goal: Not fall by end of shift  Outcome: Progressing  Goal: Be free from injury by end of the shift  Outcome: Progressing  Goal: Verbalize understanding of personal risk factors for fall in the hospital  Outcome: Progressing  Goal: Verbalize understanding of risk factor reduction measures to prevent injury from fall in the home  Outcome: Progressing  Goal: Use assistive devices by end of the shift  Outcome: Progressing  Goal: Pace activities to prevent fatigue by end of the shift  Outcome: Progressing   The patient's goals for the shift include      The clinical goals for the shift include Pt will report improved breathing/asthma symptoms

## 2024-01-28 NOTE — H&P
History Of Present Illness  Rema Silver is a 48 y.o. female presenting with asthma exacerbation.  Past medical history significant for severe persistent asthma (tezepelumab-ekko monthly), vocal cord dysfunction, fibromyalgia, chronic fatigue, pulmonary micro embolism after knee arthoplasty (no longer on AC).  Patient presents to the ED for admission of prolonged exacerbation exacerbation since November 1 when she had sinusitis.  She has tried several outpatient antibiotics with no resolution she has discontinued all antibiotics.  Patient is currently followed by pulmonology, Dr. Khna.  He recommended the patient be admitted to the hospital for steroid administration and breathing treatments since she reported failure of output patient therapy.  She is requiring several inhalers and nebulizer treatment 5+ times per day.  She reports this is her fourth exacerbation lasting over 6 months and 5 years.  The current exacerbation has lasted 8 to 12 weeks. She has not required intubation during prior exacerbations. COVID-positive 3 weeks ago and states symptoms from COVID has resolved.  She has a scheduled appointment with ENT for evaluation of her vocal cords on Monday 1/29.  It was requested for her to have a CT scan of the sinuses and chest prior to this appointment the scans were performed at this admission and did not show acute pathology. She does not complain of any pain.  Denies fever, chills, nausea, vomiting.  Reports constipation and did have a bowel movement that was loose today.   Denies history of tobacco use.    ED Course  Vitals: Temperature 96.4, pulse 97, respiratory rate 20, blood pressure 140/78, SpO2 97 RA  Labs: WBC 13.4  EKG: Normal sinus rhythm QTc 442, rate 95  Imaging: CXR No focal infiltrate or pneumothorax is identified; CT PE   Interventions: DuoNesarai, Solu-Medrol       Past Medical History  She has a past medical history of Asthma and Personal history of other diseases of the respiratory  system.    Surgical History  She has a past surgical history that includes Esophagogastroduodenoscopy (2014); Other surgical history (2020); Laparoscopy diagnostic / biopsy / aspiration / lysis (2014); Breast surgery (2014);  section, classic (2014); and Umbilical hernia repair (2014).     Social History  She reports that she has never smoked. She has never used smokeless tobacco. She reports current alcohol use. She reports that she does not use drugs.    Family History  Family History   Problem Relation Name Age of Onset    HOLLY disease Maternal Grandfather      Newman's esophagus Maternal Grandfather      Asthma Other Aunt         Allergies  Opioids - morphine analogues    Review of Systems   Constitutional:  Positive for fatigue. Negative for chills and fever.   HENT: Negative.     Eyes: Negative.    Respiratory:  Positive for shortness of breath. Negative for chest tightness and wheezing.    Cardiovascular:  Positive for leg swelling. Negative for chest pain.   Gastrointestinal:  Positive for constipation. Negative for blood in stool, diarrhea, nausea and vomiting.   Endocrine: Negative.    Genitourinary: Negative.    Musculoskeletal: Negative.    Skin: Negative.    Allergic/Immunologic: Negative.    Neurological: Negative.    Hematological: Negative.    Psychiatric/Behavioral: Negative.          Physical Exam  Constitutional:       General: She is not in acute distress.     Appearance: She is obese. She is not ill-appearing or toxic-appearing.   HENT:      Mouth/Throat:      Mouth: Mucous membranes are moist.      Pharynx: Oropharynx is clear.   Eyes:      Conjunctiva/sclera: Conjunctivae normal.   Cardiovascular:      Rate and Rhythm: Regular rhythm. Tachycardia present.      Heart sounds: Normal heart sounds.   Pulmonary:      Effort: Pulmonary effort is normal. No respiratory distress.      Breath sounds: Normal breath sounds. No wheezing, rhonchi or rales.    Abdominal:      General: Abdomen is flat. Bowel sounds are normal.   Musculoskeletal:      Right lower leg: No edema.      Left lower leg: No edema.   Skin:     General: Skin is warm and dry.   Neurological:      General: No focal deficit present.      Mental Status: She is alert. Mental status is at baseline.   Psychiatric:         Mood and Affect: Mood normal.         Behavior: Behavior normal.         Thought Content: Thought content normal.         Judgment: Judgment normal.          Last Recorded Vitals  /83   Pulse 93   Temp 35.8 °C (96.4 °F) (Temporal)   Resp (!) 24   Wt 102 kg (224 lb)   SpO2 (!) 93%     Relevant Results  Scheduled medications  enoxaparin, 40 mg, subcutaneous, q12h EVELYN  ipratropium-albuteroL, 3 mL, nebulization, q6h  polyethylene glycol, 17 g, oral, BID  [START ON 1/28/2024] predniSONE, 40 mg, oral, Daily      Continuous medications     PRN medications    Results for orders placed or performed during the hospital encounter of 01/27/24 (from the past 24 hour(s))   CBC with Differential   Result Value Ref Range    WBC 13.4 (H) 4.4 - 11.3 x10*3/uL    nRBC 0.0 0.0 - 0.0 /100 WBCs    RBC 4.58 4.00 - 5.20 x10*6/uL    Hemoglobin 14.0 12.0 - 16.0 g/dL    Hematocrit 41.8 36.0 - 46.0 %    MCV 91 80 - 100 fL    MCH 30.6 26.0 - 34.0 pg    MCHC 33.5 32.0 - 36.0 g/dL    RDW 13.1 11.5 - 14.5 %    Platelets 316 150 - 450 x10*3/uL    Neutrophils % 72.2 40.0 - 80.0 %    Immature Granulocytes %, Automated 0.7 0.0 - 0.9 %    Lymphocytes % 22.0 13.0 - 44.0 %    Monocytes % 4.8 2.0 - 10.0 %    Eosinophils % 0.1 0.0 - 6.0 %    Basophils % 0.2 0.0 - 2.0 %    Neutrophils Absolute 9.66 (H) 1.20 - 7.70 x10*3/uL    Immature Granulocytes Absolute, Automated 0.09 0.00 - 0.70 x10*3/uL    Lymphocytes Absolute 2.95 1.20 - 4.80 x10*3/uL    Monocytes Absolute 0.64 0.10 - 1.00 x10*3/uL    Eosinophils Absolute 0.01 0.00 - 0.70 x10*3/uL    Basophils Absolute 0.03 0.00 - 0.10 x10*3/uL   Comprehensive Metabolic Panel    Result Value Ref Range    Glucose 111 (H) 74 - 99 mg/dL    Sodium 135 (L) 136 - 145 mmol/L    Potassium 5.0 3.5 - 5.3 mmol/L    Chloride 103 98 - 107 mmol/L    Bicarbonate 22 21 - 32 mmol/L    Anion Gap 15 10 - 20 mmol/L    Urea Nitrogen 16 6 - 23 mg/dL    Creatinine 0.74 0.50 - 1.05 mg/dL    eGFR >90 >60 mL/min/1.73m*2    Calcium 9.8 8.6 - 10.3 mg/dL    Albumin 4.8 3.4 - 5.0 g/dL    Alkaline Phosphatase 43 33 - 110 U/L    Total Protein 7.6 6.4 - 8.2 g/dL    AST 45 (H) 9 - 39 U/L    Bilirubin, Total 0.5 0.0 - 1.2 mg/dL    ALT 39 7 - 45 U/L   Brain Natriuretic Peptide   Result Value Ref Range    BNP 12 0 - 99 pg/mL   Troponin I, High Sensitivity   Result Value Ref Range    Troponin I, High Sensitivity 3 0 - 13 ng/L   D-dimer, VTE Exclusion   Result Value Ref Range    D-Dimer, Quantitative VTE Exclusion 313 <=500 ng/mL FEU       CT angio chest for pulmonary embolism    Result Date: 1/27/2024  Interpreted By:  Ned Johnson, STUDY: CT ANGIO CHEST FOR PULMONARY EMBOLISM;  1/27/2024 8:58 pm   INDICATION: Signs/Symptoms:sob.   COMPARISON: None.   ACCESSION NUMBER(S): [id number]   ORDERING CLINICIAN: MELY RIOS   TECHNIQUE: Contiguous axial images of the chest were obtained after the intravenous administration of  contrast. Coronal and sagittal reformatted images were obtained from the axial images. MIPS were also performed and reviewed and confirmed the findings of the examination.   FINDINGS: No axillary, mediastinal, or hilar lymphadenopathy.   The heart is normal in size. There is common origin of the right brachiocephalic and left common carotid artery, an anatomic variant. No significant pericardial effusion. No evidence of acute central, main, lobar or proximal segmental pulmonary embolism.   Mild bibasilar atelectasis. No significant pleural effusion. No pneumothorax.   Limited evaluation of the upper abdomen. Hepatic steatosis.   Multilevel degenerative change of the thoracic spine.       No evidence of  acute pulmonary embolism.   Mild bibasilar subsegmental atelectasis.   MACRO: None   Signed by: Ned Johnson 1/27/2024 9:31 PM Dictation workstation:   [id number]    CT sinus w IV contrast    Result Date: 1/27/2024  Interpreted By:  Ned Johnson, STUDY: CT SINUS W IV CONTRAST;  1/27/2024 8:58 pm   INDICATION: Signs/Symptoms:congestion.   COMPARISON: None   ACCESSION NUMBER(S): [id number]   ORDERING CLINICIAN: MELY RIOS   TECHNIQUE: Contiguous axial images of the paranasal sinuses were obtained after the intravenous administration of contrast. Coronal reformatted images were obtained from the axial images.   FINDINGS: The paranasal sinuses are clear and well pneumatized. No sinus air-fluid level. The mastoid air cells are also clear and well pneumatized. No evidence of acute displaced maxillofacial fracture. No evidence of pathologic enhancement.       No significant mucosal thickening of the paranasal sinuses.   MACRO: None   Signed by: Ned Johnson 1/27/2024 9:29 PM Dictation workstation:   [id number]    XR chest 1 view    Result Date: 1/27/2024  Interpreted By:  Salvador Bettencourt, STUDY: XR CHEST 1 VIEW  1/27/2024 6:47 pm   INDICATION: Signs/Symptoms:Chest Pain   COMPARISON: 08/13/2023   ACCESSION NUMBER(S): [id number]   ORDERING CLINICIAN: MELY RIOS   TECHNIQUE: A single AP portable radiograph of the chest was obtained.   FINDINGS: No focal infiltrate, pleural effusion or pneumothorax is identified. The cardiac silhouette is within normal limits for size.       No focal infiltrate or pneumothorax is identified.   MACRO: None.   Signed by: Salvador Bettencourt 1/27/2024 7:15 PM Dictation workstation:   [id number]        Assessment/Plan   Principal Problem:    Severe persistent asthma with exacerbation    Assessment  Rema Silver is a 48 y.o. female presenting with asthma exacerbation.  Past medical history significant for severe persistent asthma (tezepelumab-ekko monthly), vocal cord dysfunction,  fibromyalgia, chronic fatigue syndrome, pulmonary micro embolism after knee arthoplasty.  Patient presents to the ED for admission of prolonged exacerbation exacerbation since November 1.  Followed by pulmonology, Dr. Khan, and was instructed to report to the hospital for steroids and breathing treatment for control of current asthma exacerbation.    Plan  #Asthma exacerbation, severe persistent (tezepelumab-ekko monthly)  #h/o vocal cord paralysis  #oral candidiasis with concurrent inhaler use  Followed By Dr. Khan (per notes possible bronchopulmonary thermoplasty candidate?)  Chest x-ray and CT negative for acute process  -125 mg of Solu-Medrol given in ED, will start 60 mg prednisone tomorrow  - DuoNebs q4h, PRN  - Nystain swish and swallow  - Singular   - Pulm consult    #Leukocytosis  WBC 13.4; likely secondary to prolonged steroid use  - Daily CBC    #Fibromyalgia  #Chronic fatigue  - continue home meds once med rec complete    DVT: Lovenox  CODE: FULL  Disposition: 2 days for exacerbation control    Assessment and plan to be discussed with senior resident.  Ritu Dang D.O.  PGY-1, Internal Medicine   - [facility] [place]      Ritu Dang, DO    I saw this patient with the above resident and performed my own History and Physical Examination.   My Subjective and Objective findings are reflected in the above documentation.  The Assessment and Plan reflect my input. My Edits are included in the above documentation.    Maia Ybarra MD   Internal Medicine PGY-3

## 2024-01-28 NOTE — CONSULTS
Pulmonary Disease Consult    PATIENT NAME: Rema Silver    MRN: [id number]  SERVICE DATE:  1/28/2024   SERVICE TIME:  5:32 PM        PRIMARY CARE PHYSICIAN: Carlitos Valdez MD  REASON FOR CONSULT: Severe asthma with exacerbation since November 23  REQUESTING PHYSICIAN: BRODY CHANG        ASSESSMENT and plan    Possible acute asthma exacerbation with underlying severe asthma  Patient is known to Dr. HAYNES for years with severe asthma on Biologics and inhalers  Patient is under evaluation for vocal cord dysfunction  Chronic recurrent steroid courses    CT chest done this admission  IMPRESSION:  No evidence of acute pulmonary embolism.      Mild bibasilar subsegmental atelectasis.      MACRO:  None      Signed by: Ned Johnson 1/27/2024 9:31 PM    Patient feels some improvement however she denies significant improvement since admission  She is requesting to increase Solu-Medrol as she is aware that this is the medication helping and as IV  Would like to increase the nebulizer treatment to every 4 -6 hours  No symptoms or signs of acute decompensated hypoxia or hypercapnia  No symptoms or signs to suggest reflux disease  No peripheral eosinophilia on admission although patient is on recurrent steroid courses    Patient said was promised by Dr. 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  She is known with Dr. HAYNES with severe asthma on multiple inhalers and has prior Biologics pER immunology    Feels she is in some flare of exacerbation for the last few weeks and months does not fully controlled  Patient evaluated frequently by Dr. Haynes who knows her for years and she was instructed to be aware of her early symptoms or signs of severe asthma to be admitted for treatment  She feels little better but not yet well she is asking to be maintained  on her IV medications  She denies chest pain hemoptysis  She was told to have normal pulmonary function  Denies history of anxiety or depression  She is currently having hoarseness of voice and requiring the statin.  Otherwise no other major organ dysfunction and review of systems are unremarkable  No history of DVT or PE      PAST MEDICAL HISTORY:   Past Medical History:   Diagnosis Date    Asthma     Personal history of other diseases of the respiratory system     History of acute bronchitis     PAST SURGICAL HISTORY:   Past Surgical History:   Procedure Laterality Date    BREAST SURGERY  2014    Breast Surgery Reduction Procedure     SECTION, CLASSIC  2014     Section    ESOPHAGOGASTRODUODENOSCOPY  2014    Diagnostic Esophagogastroduodenoscopy    LAPAROSCOPY DIAGNOSTIC / BIOPSY / ASPIRATION / LYSIS  2014    Exploratory Laparoscopy    OTHER SURGICAL HISTORY  2020    Complete colonoscopy    UMBILICAL HERNIA REPAIR  2014    Umbilical Hernia Repair     FAMILY HISTORY:   Family History   Problem Relation Name Age of Onset    HOLLY disease Maternal Grandfather      Newman's esophagus Maternal Grandfather      Asthma Other Aunt      SOCIAL HISTORY:   Social History     Tobacco Use    Smoking status: Never    Smokeless tobacco: Never   Substance Use Topics    Alcohol use: Yes     Comment: very rarely    Drug use: Never     CURRENT ALLERGIES:   Allergies as of 2024 - Reviewed 2024   Allergen Reaction Noted    Opioids - morphine analogues Other 2010       MEDICATIONS:  Scheduled medications  budesonide, 0.5 mg, nebulization, BID  enoxaparin, 40 mg, subcutaneous, q12h EVELYN  formoterol, 20 mcg, nebulization, BID  ipratropium-albuteroL, 3 mL, nebulization, q4h  magnesium oxide, 400 mg, oral, Daily  methylPREDNISolone sodium succinate (PF), 40 mg, intravenous, q8h  montelukast, 10 mg, oral, Nightly  nystatin, 500,000 Units, oral, 4x daily  pantoprazole, 40 mg,  oral, Daily before breakfast  polyethylene glycol, 17 g, oral, BID      Continuous medications     PRN medications  PRN medications: acetaminophen-caffeine, cetirizine, ipratropium-albuteroL      ROS  Review of Systems      Physical Exam  Vitals reviewed.   Constitutional:       Appearance: Normal appearance. She is obese.   HENT:      Head: Normocephalic and atraumatic.   Eyes:      Extraocular Movements: Extraocular movements intact.      Pupils: Pupils are equal, round, and reactive to light.   Cardiovascular:      Rate and Rhythm: Normal rate and regular rhythm.      Heart sounds: No murmur heard.  Pulmonary:      Effort: No respiratory distress present.      Breath sounds: No stridor.  No wheezing and rhonchi present.   Chest:      Chest wall: No tenderness.   Abdominal:      General: Bowel sounds are normal. There is no distension.      Palpations: There is no mass.      Tenderness: There is no abdominal tenderness. There is no rebound.   Musculoskeletal:         General: No swelling present. No tenderness or deformity.      Cervical back: No rigidity.   Lymphadenopathy:      Cervical: No cervical adenopathy.   Skin:     General: Skin is warm.      Coloration: Skin is pale. Skin is not jaundiced.      Findings: No bruising.   Neurological:      General: No focal deficit present.      Mental Status: She is alert and oriented to person, place, and time. Mental status is at baseline.      Cranial Nerves: No cranial nerve deficit.      Motor: Weakness present.   Psychiatric:         Mood and Affect: Mood normal.         Behavior: Behavior normal.     Patient Vitals for the past 24 hrs:   BP Temp Temp src Pulse Resp SpO2 Height Weight   01/28/24 1342 -- -- -- -- -- 98 % -- --   01/28/24 1200 140/77 35.6 °C (96.1 °F) Temporal 100 21 97 % -- --   01/28/24 0921 -- -- -- -- -- 98 % -- --   01/28/24 0800 122/70 35.4 °C (95.7 °F) Temporal 90 20 97 % -- --   01/28/24 0030 -- -- -- -- -- 97 % -- --   01/28/24 0015 130/84  "36 °C (96.8 °F) Temporal 95 16 98 % 1.524 m (5') 102 kg (224 lb)   01/27/24 2300 -- -- -- 97 -- (!) 93 % -- --   01/27/24 2248 159/74 -- -- 96 20 95 % -- --   01/27/24 2245 -- -- -- 96 -- 96 % -- --   01/27/24 2230 -- -- -- 96 -- 94 % -- --   01/27/24 2215 -- -- -- 92 -- 96 % -- --   01/27/24 2200 152/82 -- -- 99 -- (!) 92 % -- --   01/27/24 2145 -- -- -- 92 -- 95 % -- --   01/27/24 2130 155/74 -- -- 93 -- (!) 93 % -- --   01/27/24 2030 171/83 -- -- 91 -- (!) 92 % -- --   01/27/24 2015 -- -- -- 97 -- 95 % -- --   01/27/24 2000 (!) 174/100 -- -- 89 -- 96 % -- --   01/27/24 1945 -- -- -- 87 -- 95 % -- --   01/27/24 1930 137/87 -- -- 93 (!) 24 95 % -- --   01/27/24 1915 -- -- -- 96 (!) 25 94 % -- --   01/27/24 1900 -- -- -- 97 -- -- -- --   01/27/24 1845 -- -- -- 99 -- -- -- --   01/27/24 1830 130/76 -- -- 98 (!) 21 98 % -- --   01/27/24 1815 -- -- -- 95 -- 98 % -- --   01/27/24 1752 140/78 35.8 °C (96.4 °F) Temporal 97 20 97 % 1.524 m (5') 102 kg (224 lb)     Body mass index is 43.75 kg/m².      Gen: NAD  Neck: symmetric, no mass  Cardiovascular: RRR  Respiratory: No distress   GI: Abd soft, nontender, non-distended  Extremities: no leg edema  Skin: Warm and dry.  Neuro: alert and oriented times 3  : no jimenez     Labs:  Lab Results   Component Value Date    WBC 17.2 (H) 01/28/2024    HGB 13.4 01/28/2024    HCT 41.4 01/28/2024    MCV 93 01/28/2024     01/28/2024     Lab Results   Component Value Date    GLUCOSE 122 (H) 01/28/2024    CALCIUM 9.8 01/28/2024     01/28/2024    K 4.1 01/28/2024    CO2 20 (L) 01/28/2024     01/28/2024    BUN 14 01/28/2024    CREATININE 0.62 01/28/2024   ESR: --  Lab Results   Component Value Date    SEDRATE 15 10/06/2017   No results found for: \"CRP\"  Lab Results   Component Value Date    ALT 39 01/27/2024    AST 45 (H) 01/27/2024    ALKPHOS 43 01/27/2024    BILITOT 0.5 01/27/2024   @ABG@      DATA:   Diagnostic tests reviewed for today's visit:    Labs this admission " reviewed  Imagings this admission reviewed  Cultures: Reviewed    CT angio chest for pulmonary embolism   Final Result   No evidence of acute pulmonary embolism.        Mild bibasilar subsegmental atelectasis.        MACRO:   None        Signed by: Ned Johnson 1/27/2024 9:31 PM   Dictation workstation:   [id number]      CT sinus w IV contrast   Final Result   No significant mucosal thickening of the paranasal sinuses.        MACRO:   None        Signed by: Ned Johnson 1/27/2024 9:29 PM   Dictation workstation:   [id number]      XR chest 1 view   Final Result   No focal infiltrate or pneumothorax is identified.        MACRO:   None.        Signed by: Salvador Bettencourt 1/27/2024 7:15 PM   Dictation workstation:   [id number]               Will continue to follow     Thank you so much for this consultation     Tracie Diaz MD

## 2024-01-28 NOTE — ED PROVIDER NOTES
EMERGENCY DEPARTMENT ENCOUNTER      Pt Name: Rema Silver  MRN: [id number]  Birthdate 1975  Date of evaluation: 2024  Provider: Praveen Chu DO    CHIEF COMPLAINT       Chief Complaint   Patient presents with    Shortness of Breath         HISTORY OF PRESENT ILLNESS    HPI  48-year-old female presents emergency department with chief complaint of shortness of breath.  Patient is a past medical history of asthma and she has been seeing Dr. Khan here at [facility] regarding her asthma.  Patient indicates she has been an asthma exacerbation for the past 12 weeks and has been in contact with her doctor her doctor indicated for her to come out today because she has maxed out all of her outpatient treatments.  Nursing Notes were reviewed.    PAST MEDICAL HISTORY     Past Medical History:   Diagnosis Date    Asthma     Personal history of other diseases of the respiratory system     History of acute bronchitis         SURGICAL HISTORY       Past Surgical History:   Procedure Laterality Date    BREAST SURGERY  2014    Breast Surgery Reduction Procedure     SECTION, CLASSIC  2014     Section    ESOPHAGOGASTRODUODENOSCOPY  2014    Diagnostic Esophagogastroduodenoscopy    LAPAROSCOPY DIAGNOSTIC / BIOPSY / ASPIRATION / LYSIS  2014    Exploratory Laparoscopy    OTHER SURGICAL HISTORY  2020    Complete colonoscopy    UMBILICAL HERNIA REPAIR  2014    Umbilical Hernia Repair         CURRENT MEDICATIONS       Previous Medications    ALBUTEROL 2.5 MG /3 ML (0.083 %) NEBULIZER SOLUTION    3 mL (2.5 mg) see administration instructions. Every 4-6 hours as needed for wheezing    AMOXICILLIN (AMOXIL) 875 MG TABLET    Take 1 tablet (875 mg) by mouth every 12 hours.    AMOXICILLIN-POT CLAVULANATE (AUGMENTIN) 875-125 MG TABLET    Take 1 tablet (875 mg) by mouth 2 times a day with meals.    BENZONATATE (TESSALON) 100 MG CAPSULE    Take 1 capsule (100 mg) by mouth 3 times a day  as needed for cough.    CETIRIZINE (ZYRTEC) 10 MG TABLET    Take 1 tablet (10 mg) by mouth once daily as needed for allergies.    CYANOCOBALAMIN (VITAMIN B-12) 1,000 MCG TABLET    Take 1 tablet (1,000 mcg) by mouth once daily.    DICYCLOMINE (BENTYL) 20 MG TABLET    Take 1 tablet (20 mg) by mouth twice a day.    DICYCLOMINE (BENTYL) 20 MG TABLET    Take 1 tablet (20 mg) by mouth every 6 hours if needed.    FAMOTIDINE (PEPCID) 20 MG TABLET    Take 1 tablet (20 mg) by mouth once daily at bedtime.    FLUTICASONE PROPION-SALMETEROL (ADVAIR DISKUS) 500-50 MCG/DOSE DISKUS INHALER    Inhale 1 puff 2 times a day. Rinse mouth with water after use to reduce aftertaste and incidence of candidiasis. Do not swallow.    HAIR, SKIN AND NAILS, BIOTIN, ORAL    Take 4 tablets by mouth once daily.    IBUPROFEN (MOTRIN) CAPSULE    Take 1-2 capsules (200-400 mg) by mouth every 6 hours if needed.    IPRATROPIUM (ATROVENT) 0.02 % NEBULIZER SOLUTION    Take by nebulization every 4 hours. INHALE 1 VIAL USING NEBULIZER  MIX WILL 1 DOSE OF ALBUTEROL FOR EACH TREATMENT    IPRATROPIUM (ATROVENT) 42 MCG (0.06 %) NASAL SPRAY    Administer 2 sprays into each nostril see administration instructions. 2-3 times per day    IPRATROPIUM-ALBUTEROL (COMBIVENT RESPIMAT)  MCG/ACTUATION INHALER    Inhale 1 puff 4 times a day as needed for wheezing.    IPRATROPIUM-ALBUTEROL (DUO-NEB) 0.5-2.5 MG/3 ML NEBULIZER SOLUTION    Take 3 mL by nebulization see administration instructions. Every 4-6 hours prn    L. ACIDOPHILUS/BIFID. ANIMALIS 15.5 BILLION CELL CAPSULE    Take 1 capsule by mouth once daily in the morning.    MAGNESIUM OXIDE (MAG-OX) 400 MG (241.3 MG MAGNESIUM) TABLET    Take 1 tablet (400 mg) by mouth once daily.    MONTELUKAST (SINGULAIR) 10 MG TABLET    Take 1 tablet (10 mg) by mouth once daily.    NON FORMULARY    Take by mouth. DIM + bioperine  Take 1  tablet daily for migraines    NYSTATIN (MYCOSTATIN) 100,000 UNIT/ML SUSPENSION    Take 5 mL  (500,000 Units) by mouth 4 times a day for 14 days. Swish in mouth and swallow.    PAMABROM 50 MG CAPSULE    Take 1 capsule by mouth twice a day.    PANTOPRAZOLE (PROTONIX) 40 MG EC TABLET    TAKE 1 TABLET BY MOUTH TWICE DAILY 30  MINUTES  BEFORE  BREAKFAST  AND  DINNER    POLYETHYLENE GLYCOL (GLYCOLAX, MIRALAX) 17 GRAM/DOSE POWDER    Take 17 g by mouth once daily. MIX 1 CAPFUL (17GM) IN 8 OUNCES OF WATER, JUICE, OR TEA AND DRINK    POTASSIUM CHLORIDE ORAL    Take 99 mg by mouth once daily.    POTASSIUM CHLORIDE ORAL    Take 2 tablets by mouth once daily. 99mg oral tablet    PREDNISONE (DELTASONE) 10 MG TABLET    Take 2 tablets every day for 1 week then 1 tablet daily    SPIRIVA RESPIMAT 2.5 MCG/ACTUATION INHALER    Inhale 2 puffs once daily.    SUCRALFATE (CARAFATE) 100 MG/ML SUSPENSION    Take 10 mL (1 g) by mouth every 12 hours if needed.    TEZSPIRE SUBQ SYRINGE    Inject 1.9 mL (210 mg) under the skin every 28 (twenty-eight) days. 210 mg/1.91ml subcutanous solution    TURMERIC-TURMERIC ROOT EXTRACT 450-50 MG CAPSULE    Take 1 capsule by mouth twice a day.    WHEAT DEXTRIN (BENEFIBER CLEAR SF, DEXTRIN,) 3 GRAM/3.5 GRAM POWDER IN PACKET    Take 1 Scoop by mouth once daily. Take with a full glass of water    ZINC ORAL    Take 1 tablet by mouth once daily.       ALLERGIES     Opioids - morphine analogues    FAMILY HISTORY       Family History   Problem Relation Name Age of Onset    HOLLY disease Maternal Grandfather      Newman's esophagus Maternal Grandfather      Asthma Other Aunt           SOCIAL HISTORY       Social History     Socioeconomic History    Marital status:      Spouse name: None    Number of children: None    Years of education: None    Highest education level: None   Occupational History    None   Tobacco Use    Smoking status: Never    Smokeless tobacco: Never   Substance and Sexual Activity    Alcohol use: Yes     Comment: very rarely    Drug use: Never    Sexual activity: None   Other  Topics Concern    None   Social History Narrative    None     Social Determinants of Health     Financial Resource Strain: Not on file   Food Insecurity: Not on file   Transportation Needs: Not on file   Physical Activity: Not on file   Stress: Not on file   Social Connections: Not on file   Intimate Partner Violence: Not on file   Housing Stability: Not on file       SCREENINGS                        PHYSICAL EXAM    (up to 7 for level 4, 8 or more for level 5)     ED Triage Vitals [01/27/24 1752]   Temperature Heart Rate Respirations BP   35.8 °C (96.4 °F) 97 20 140/78      Pulse Ox Temp Source Heart Rate Source Patient Position   97 % Temporal Monitor Sitting      BP Location FiO2 (%)     Right arm --       Physical Exam  Vitals and nursing note reviewed.   Constitutional:       General: She is not in acute distress.     Appearance: She is well-developed.   HENT:      Head: Normocephalic and atraumatic.   Eyes:      Conjunctiva/sclera: Conjunctivae normal.   Cardiovascular:      Rate and Rhythm: Normal rate and regular rhythm.      Heart sounds: No murmur heard.  Pulmonary:      Effort: Pulmonary effort is normal. Tachypnea present. No respiratory distress.      Breath sounds: Normal breath sounds.   Abdominal:      Palpations: Abdomen is soft.      Tenderness: There is no abdominal tenderness.   Musculoskeletal:         General: No swelling.      Cervical back: Neck supple.   Skin:     General: Skin is warm and dry.      Capillary Refill: Capillary refill takes less than 2 seconds.   Neurological:      Mental Status: She is alert.   Psychiatric:         Mood and Affect: Mood normal.          DIAGNOSTIC RESULTS     LABS:  Labs Reviewed   CBC WITH AUTO DIFFERENTIAL - Abnormal       Result Value    WBC 13.4 (*)     nRBC 0.0      RBC 4.58      Hemoglobin 14.0      Hematocrit 41.8      MCV 91      MCH 30.6      MCHC 33.5      RDW 13.1      Platelets 316      Neutrophils % 72.2      Immature Granulocytes %, Automated  0.7      Lymphocytes % 22.0      Monocytes % 4.8      Eosinophils % 0.1      Basophils % 0.2      Neutrophils Absolute 9.66 (*)     Immature Granulocytes Absolute, Automated 0.09      Lymphocytes Absolute 2.95      Monocytes Absolute 0.64      Eosinophils Absolute 0.01      Basophils Absolute 0.03     COMPREHENSIVE METABOLIC PANEL - Abnormal    Glucose 111 (*)     Sodium 135 (*)     Potassium 5.0      Chloride 103      Bicarbonate 22      Anion Gap 15      Urea Nitrogen 16      Creatinine 0.74      eGFR >90      Calcium 9.8      Albumin 4.8      Alkaline Phosphatase 43      Total Protein 7.6      AST 45 (*)     Bilirubin, Total 0.5      ALT 39     B-TYPE NATRIURETIC PEPTIDE - Normal    BNP 12      Narrative:        <100 pg/mL - Heart failure unlikely  100-299 pg/mL - Intermediate probability of acute heart                  failure exacerbation. Correlate with clinical                  context and patient history.    >=300 pg/mL - Heart Failure likely. Correlate with clinical                  context and patient history.    BNP testing is performed using different testing methodology at [facility] than at other [facility]. Direct result comparisons should only be made within the same method.      TROPONIN I, HIGH SENSITIVITY - Normal    Troponin I, High Sensitivity 3      Narrative:     Less than 99th percentile of normal range cutoff-  Female and children under 18 years old <14 ng/L; Male <21 ng/L: Negative  Repeat testing should be performed if clinically indicated.     Female and children under 18 years old 14-50 ng/L; Male 21-50 ng/L:  Consistent with possible cardiac damage and possible increased clinical   risk. Serial measurements may help to assess extent of myocardial damage.     >50 ng/L: Consistent with cardiac damage, increased clinical risk and  myocardial infarction. Serial measurements may help assess extent of   myocardial damage.      NOTE: Children less than 1 year old may have  higher baseline troponin   levels and results should be interpreted in conjunction with the overall   clinical context.     NOTE: Troponin I testing is performed using a different   testing methodology at [facility] than at other   [facility] hospitals. Direct result comparisons should only   be made within the same method.   D-DIMER, VTE EXCLUSION - Normal    D-Dimer, Quantitative VTE Exclusion 313      Narrative:     The VTE Exclusion D-Dimer assay is reported in ng/mL Fibrinogen Equivalent Units (FEU).    Per 's instructions for use, a value of less than 500 ng/mL (FEU) may help to exclude DVT or PE in outpatients when the assay is used with a clinical pretest probability assessment.(A[facility] must utilize and document eCalc 'Wells Score Deep Vein Thrombosis Risk' for DVT exclusion only. Emergency Department should utilize  Guidelines for Emergency Department Use of the VTE Exclusion D-Dimer and Clinical Pretest probability assessment model for DVT or PE exclusion.)       All other labs were within normal range or not returned as of this dictation.    Imaging  CT angio chest for pulmonary embolism   Final Result   No evidence of acute pulmonary embolism.        Mild bibasilar subsegmental atelectasis.        MACRO:   None        Signed by: Ned Johnson 1/27/2024 9:31 PM   Dictation workstation:   [id number]      CT sinus w IV contrast   Final Result   No significant mucosal thickening of the paranasal sinuses.        MACRO:   None        Signed by: Ned Johnson 1/27/2024 9:29 PM   Dictation workstation:   [id number]      XR chest 1 view   Final Result   No focal infiltrate or pneumothorax is identified.        MACRO:   None.        Signed by: Salvador Bettencourt 1/27/2024 7:15 PM   Dictation workstation:   [id number]           Procedures  Procedures     EMERGENCY DEPARTMENT COURSE/MDM:     Diagnoses as of 01/27/24 2155   Severe persistent asthma with exacerbation        Medical Decision  Making  48-year-old female presents emergency department for chief complaint of shortness of breath.  Medical management treatment emergency department will consist of Troponin high-sensitivity BNP, CMP, CBC patient has a mild the elevated white count at 13.4 chest x-ray shows no acute findings.  DuoNeb treatment as well as Solu-Medrol 125.  The patient will most likely be admitted to the hospital for failed outpatient asthma treatment conversation was had with Dr. Khan indicates that he wants the patient and for evaluation and treatment for her asthma.  CT sinus and CT PE, no acute process.  Patient to be admitted under general medicine teaching attending Dr. Inman.  Signout was given to patient to teaching team residents she will be admitted for acute asthma exacerbation.        Patient and or family in agreement and understanding of treatment plan.  All questions answered.      I reviewed the case with the attending ED physician. The attending ED physician agrees with the plan. Patient and/or patient´s representative was counseled regarding labs, imaging, likely diagnosis, and plan. All questions were answered.    ED Medications administered this visit:    Medications   ipratropium-albuteroL (Duo-Neb) 0.5-2.5 mg/3 mL nebulizer solution 3 mL (3 mL nebulization Given 1/27/24 1951)   methylPREDNISolone sod succinate (SOLU-Medrol) injection 125 mg (125 mg intravenous Given 1/27/24 1847)   iohexol (OMNIPaque) 350 mg iodine/mL solution 90 mL (90 mL intravenous Given 1/27/24 2056)       New Prescriptions from this visit:    New Prescriptions    No medications on file       Follow-up:  No follow-up provider specified.      Final Impression:   1. Severe persistent asthma with exacerbation          (Please note that portions of this note were completed with a voice recognition program.  Efforts were made to edit the dictations but occasionally words are mis-transcribed.)     Praveen Chu,   Resident  01/27/24 4656

## 2024-01-28 NOTE — CARE PLAN
The clinical goals for the shift include Pt will report improved breathing/asthma symptoms      Problem: Pain  Goal: My pain/discomfort is manageable  Outcome: Progressing     Problem: Safety  Goal: Patient will be injury free during hospitalization  Outcome: Progressing  Goal: I will remain free of falls  Outcome: Progressing     Problem: Daily Care  Goal: Daily care needs are met  Outcome: Progressing     Problem: Psychosocial Needs  Goal: Demonstrates ability to cope with hospitalization/illness  Outcome: Progressing  Goal: Collaborate with me, my family, and caregiver to identify my specific goals  Outcome: Progressing  Flowsheets (Taken 1/28/2024 1603)  Cultural Requests During Hospitalization: n/a  Spiritual Requests During Hospitalization: n/a     Problem: Discharge Barriers  Goal: My discharge needs are met  Outcome: Progressing     Problem: Fall/Injury  Goal: Not fall by end of shift  Outcome: Progressing  Goal: Be free from injury by end of the shift  Outcome: Progressing  Goal: Verbalize understanding of personal risk factors for fall in the hospital  Outcome: Progressing  Goal: Verbalize understanding of risk factor reduction measures to prevent injury from fall in the home  Outcome: Progressing  Goal: Use assistive devices by end of the shift  Outcome: Progressing  Goal: Pace activities to prevent fatigue by end of the shift  Outcome: Progressing

## 2024-01-28 NOTE — PROGRESS NOTES
Rema Silver is a 48 y.o. female on day 0 of admission presenting with Severe persistent asthma with exacerbation.    Subjective   Patient seen and examined at bedside this morning.  Stated that her dyspnea and wheezing improved since admission and responded well to Solu-Medrol.  She has been dealing with prolonged asthma exacerbation since beginning of November of last year.  Asymptomatic otherwise at the time of the evaluation.  Plan for her to continue on Solu-Medrol twice daily, the rest of her home medication, DuoNebs every 4 and as needed and to use the respiratory peak flow meter 3 times daily.  Dr. Khan is on consult to see her in the morning.           Objective     Physical Exam  Constitutional:       General: She is not in acute distress.     Appearance: She is obese. She is not ill-appearing or toxic-appearing.   HENT:      Mouth/Throat:      Mouth: Mucous membranes are moist.      Pharynx: Oropharynx is clear.   Eyes:      Conjunctiva/sclera: Conjunctivae normal.   Cardiovascular:      Rate and Rhythm: Regular rhythm.     Heart sounds: Normal heart sounds.   Pulmonary:      Effort: Pulmonary effort is normal with episodes of mild tachypnea. No respiratory distress.      Breath sounds: Mild expiratory wheezing  Abdominal:      General: Abdomen is flat. Bowel sounds are normal.   Musculoskeletal:      Right lower leg: No edema.      Left lower leg: No edema.   Skin:     General: Skin is warm and dry.   Neurological:      General: No focal deficit present.      Mental Status: She is alert. Mental status is at baseline.   Psychiatric:         Mood and Affect: Mood normal.         Behavior: Behavior normal.         Thought Content: Thought content normal.         Judgment: Judgment normal.     Last Recorded Vitals  Blood pressure 140/77, pulse 100, temperature 35.6 °C (96.1 °F), temperature source Temporal, resp. rate 21, height 1.524 m (5'), weight 102 kg (224 lb), SpO2 97 %.  Intake/Output last 3  Shifts:  No intake/output data recorded.    Relevant Results  Scheduled medications  enoxaparin, 40 mg, subcutaneous, q12h EVELYN  ipratropium-albuteroL, 3 mL, nebulization, q4h  methylPREDNISolone sodium succinate (PF), 40 mg, intravenous, q12h  montelukast, 10 mg, oral, Nightly  nystatin, 5 mL, Swish & Swallow, 4x daily  pantoprazole, 40 mg, oral, Daily before breakfast  polyethylene glycol, 17 g, oral, BID      Continuous medications     PRN medications  PRN medications: ipratropium-albuteroL  Results from last 7 days   Lab Units 01/28/24  0708 01/27/24  1818   WBC AUTO x10*3/uL 17.2* 13.4*   RBC AUTO x10*6/uL 4.47 4.58   HEMOGLOBIN g/dL 13.4 14.0     Results from last 7 days   Lab Units 01/28/24  0708 01/27/24  1818   SODIUM mmol/L 136 135*   POTASSIUM mmol/L 4.1 5.0   CHLORIDE mmol/L 104 103   CO2 mmol/L 20* 22   BUN mg/dL 14 16   CREATININE mg/dL 0.62 0.74   CALCIUM mg/dL 9.8 9.8   PHOSPHORUS mg/dL 2.7  --    MAGNESIUM mg/dL 2.10  --    BILIRUBIN TOTAL mg/dL  --  0.5   ALT U/L  --  39   AST U/L  --  45*       CT angio chest for pulmonary embolism    Result Date: 1/27/2024  Interpreted By:  Ned Johnson, STUDY: CT ANGIO CHEST FOR PULMONARY EMBOLISM;  1/27/2024 8:58 pm   INDICATION: Signs/Symptoms:sob.   COMPARISON: None.   ACCESSION NUMBER(S): [id number]   ORDERING CLINICIAN: MELY RIOS   TECHNIQUE: Contiguous axial images of the chest were obtained after the intravenous administration of  contrast. Coronal and sagittal reformatted images were obtained from the axial images. MIPS were also performed and reviewed and confirmed the findings of the examination.   FINDINGS: No axillary, mediastinal, or hilar lymphadenopathy.   The heart is normal in size. There is common origin of the right brachiocephalic and left common carotid artery, an anatomic variant. No significant pericardial effusion. No evidence of acute central, main, lobar or proximal segmental pulmonary embolism.   Mild bibasilar atelectasis. No  significant pleural effusion. No pneumothorax.   Limited evaluation of the upper abdomen. Hepatic steatosis.   Multilevel degenerative change of the thoracic spine.       No evidence of acute pulmonary embolism.   Mild bibasilar subsegmental atelectasis.   MACRO: None   Signed by: Ned Johnson 1/27/2024 9:31 PM Dictation workstation:   [id number]    CT sinus w IV contrast    Result Date: 1/27/2024  Interpreted By:  Ned Johnson, STUDY: CT SINUS W IV CONTRAST;  1/27/2024 8:58 pm   INDICATION: Signs/Symptoms:congestion.   COMPARISON: None   ACCESSION NUMBER(S): [id number]   ORDERING CLINICIAN: MELY RIOS   TECHNIQUE: Contiguous axial images of the paranasal sinuses were obtained after the intravenous administration of contrast. Coronal reformatted images were obtained from the axial images.   FINDINGS: The paranasal sinuses are clear and well pneumatized. No sinus air-fluid level. The mastoid air cells are also clear and well pneumatized. No evidence of acute displaced maxillofacial fracture. No evidence of pathologic enhancement.       No significant mucosal thickening of the paranasal sinuses.   MACRO: None   Signed by: Ned Johnson 1/27/2024 9:29 PM Dictation workstation:   [id number]    XR chest 1 view    Result Date: 1/27/2024  Interpreted By:  Salvador Bettencourt, STUDY: XR CHEST 1 VIEW  1/27/2024 6:47 pm   INDICATION: Signs/Symptoms:Chest Pain   COMPARISON: 08/13/2023   ACCESSION NUMBER(S): [id number]   ORDERING CLINICIAN: MELY RIOS   TECHNIQUE: A single AP portable radiograph of the chest was obtained.   FINDINGS: No focal infiltrate, pleural effusion or pneumothorax is identified. The cardiac silhouette is within normal limits for size.       No focal infiltrate or pneumothorax is identified.   MACRO: None.   Signed by: Salvador Bettencourt 1/27/2024 7:15 PM Dictation workstation:   [id number]       Assessment/Plan   Principal Problem:    Severe persistent asthma with exacerbation  Rema Silver is a 48  y.o. female presenting with asthma exacerbation.  Past medical history significant for severe persistent asthma (tezepelumab-ekko monthly), vocal cord dysfunction, fibromyalgia, chronic fatigue syndrome, pulmonary micro embolism after knee arthoplasty.  Patient presents to the ED for admission of prolonged exacerbation exacerbation since November 1.  Followed by pulmonology, Dr. Khan, and was instructed to report to the hospital for steroids and breathing treatment for control of current asthma exacerbation.     Plan    #Asthma exacerbation, severe persistent (tezepelumab-ekko monthly)  #h/o vocal cord paralysis  #oral candidiasis with concurrent inhaler use  Followed By Dr. Khan (per notes possible bronchopulmonary thermoplasty candidate at some point in the future)  Chest x-ray and CT negative for acute process  -125 mg of Solu-Medrol given in ED, continue Solu-Medrol 40 mg IV twice daily  - DuoNebs q4h scheduled, and PRN  - Nystain swish and swallow  - Singular   - Pulm consult, Dr. Khan on board to see patient tomorrow  -Peak respiratory flow meter 3 times daily with regarding of the values for comparison/disease progression/control     #Leukocytosis  WBC 13.4; likely secondary to prolonged steroid use  - Daily CBC     #Fibromyalgia  #Chronic fatigue  - continue home meds     DVT: Lovenox  CODE: FULL  Disposition: 3-4 days for exacerbation control with pulmonary on consult             Discussed with attending,  Ketan Perez M.D. PGY-2  Internal Medicine    This note has been transcribed using Dragon voice recognition system and there is a possibility of unintentional typing misprints.  Any information found to be copied from previous providers is done in the best interest of the patient to provide accurate, quality, and continuity of care.

## 2024-01-29 ENCOUNTER — APPOINTMENT (OUTPATIENT)
Dept: OTOLARYNGOLOGY | Facility: CLINIC | Age: 49
End: 2024-01-29
Payer: COMMERCIAL

## 2024-01-29 LAB
ALBUMIN SERPL BCP-MCNC: 4.7 G/DL (ref 3.4–5)
ANION GAP SERPL CALC-SCNC: 21 MMOL/L (ref 10–20)
BUN SERPL-MCNC: 15 MG/DL (ref 6–23)
CALCIUM SERPL-MCNC: 9.8 MG/DL (ref 8.6–10.3)
CHLORIDE SERPL-SCNC: 100 MMOL/L (ref 98–107)
CO2 SERPL-SCNC: 20 MMOL/L (ref 21–32)
CREAT SERPL-MCNC: 0.66 MG/DL (ref 0.5–1.05)
EGFRCR SERPLBLD CKD-EPI 2021: >90 ML/MIN/1.73M*2
ERYTHROCYTE [DISTWIDTH] IN BLOOD BY AUTOMATED COUNT: 13.2 % (ref 11.5–14.5)
GLUCOSE SERPL-MCNC: 140 MG/DL (ref 74–99)
HCT VFR BLD AUTO: 44.5 % (ref 36–46)
HGB BLD-MCNC: 14.2 G/DL (ref 12–16)
MAGNESIUM SERPL-MCNC: 2.21 MG/DL (ref 1.6–2.4)
MCH RBC QN AUTO: 30.3 PG (ref 26–34)
MCHC RBC AUTO-ENTMCNC: 31.9 G/DL (ref 32–36)
MCV RBC AUTO: 95 FL (ref 80–100)
NRBC BLD-RTO: 0 /100 WBCS (ref 0–0)
PHOSPHATE SERPL-MCNC: 3.5 MG/DL (ref 2.5–4.9)
PLATELET # BLD AUTO: 328 X10*3/UL (ref 150–450)
POTASSIUM SERPL-SCNC: 4.3 MMOL/L (ref 3.5–5.3)
RBC # BLD AUTO: 4.69 X10*6/UL (ref 4–5.2)
SODIUM SERPL-SCNC: 137 MMOL/L (ref 136–145)
WBC # BLD AUTO: 20.9 X10*3/UL (ref 4.4–11.3)

## 2024-01-29 PROCEDURE — 85027 COMPLETE CBC AUTOMATED: CPT

## 2024-01-29 PROCEDURE — 2500000004 HC RX 250 GENERAL PHARMACY W/ HCPCS (ALT 636 FOR OP/ED)

## 2024-01-29 PROCEDURE — 36415 COLL VENOUS BLD VENIPUNCTURE: CPT

## 2024-01-29 PROCEDURE — 99233 SBSQ HOSP IP/OBS HIGH 50: CPT

## 2024-01-29 PROCEDURE — 2500000004 HC RX 250 GENERAL PHARMACY W/ HCPCS (ALT 636 FOR OP/ED): Performed by: INTERNAL MEDICINE

## 2024-01-29 PROCEDURE — 2500000001 HC RX 250 WO HCPCS SELF ADMINISTERED DRUGS (ALT 637 FOR MEDICARE OP)

## 2024-01-29 PROCEDURE — 1100000001 HC PRIVATE ROOM DAILY

## 2024-01-29 PROCEDURE — 83735 ASSAY OF MAGNESIUM: CPT

## 2024-01-29 PROCEDURE — 2500000002 HC RX 250 W HCPCS SELF ADMINISTERED DRUGS (ALT 637 FOR MEDICARE OP, ALT 636 FOR OP/ED)

## 2024-01-29 PROCEDURE — 80069 RENAL FUNCTION PANEL: CPT

## 2024-01-29 PROCEDURE — 2500000002 HC RX 250 W HCPCS SELF ADMINISTERED DRUGS (ALT 637 FOR MEDICARE OP, ALT 636 FOR OP/ED): Performed by: INTERNAL MEDICINE

## 2024-01-29 PROCEDURE — 94640 AIRWAY INHALATION TREATMENT: CPT

## 2024-01-29 RX ADMIN — IPRATROPIUM BROMIDE AND ALBUTEROL SULFATE 3 ML: 2.5; .5 SOLUTION RESPIRATORY (INHALATION) at 12:32

## 2024-01-29 RX ADMIN — IPRATROPIUM BROMIDE AND ALBUTEROL SULFATE 3 ML: 2.5; .5 SOLUTION RESPIRATORY (INHALATION) at 16:36

## 2024-01-29 RX ADMIN — BUDESONIDE INHALATION 0.5 MG: 0.5 SUSPENSION RESPIRATORY (INHALATION) at 20:30

## 2024-01-29 RX ADMIN — PANTOPRAZOLE SODIUM 40 MG: 40 TABLET, DELAYED RELEASE ORAL at 06:55

## 2024-01-29 RX ADMIN — NYSTATIN 500000 UNITS: 100000 SUSPENSION ORAL at 06:55

## 2024-01-29 RX ADMIN — NYSTATIN 500000 UNITS: 100000 SUSPENSION ORAL at 14:00

## 2024-01-29 RX ADMIN — FORMOTEROL FUMARATE 20 MCG: 20 SOLUTION RESPIRATORY (INHALATION) at 06:17

## 2024-01-29 RX ADMIN — MONTELUKAST 10 MG: 10 TABLET, FILM COATED ORAL at 21:04

## 2024-01-29 RX ADMIN — IPRATROPIUM BROMIDE AND ALBUTEROL SULFATE 3 ML: 2.5; .5 SOLUTION RESPIRATORY (INHALATION) at 20:30

## 2024-01-29 RX ADMIN — POLYETHYLENE GLYCOL 3350 17 G: 17 POWDER, FOR SOLUTION ORAL at 21:04

## 2024-01-29 RX ADMIN — METHYLPREDNISOLONE SODIUM SUCCINATE 40 MG: 40 INJECTION, POWDER, FOR SOLUTION INTRAMUSCULAR; INTRAVENOUS at 21:05

## 2024-01-29 RX ADMIN — IPRATROPIUM BROMIDE AND ALBUTEROL SULFATE 3 ML: 2.5; .5 SOLUTION RESPIRATORY (INHALATION) at 06:17

## 2024-01-29 RX ADMIN — NYSTATIN 500000 UNITS: 100000 SUSPENSION ORAL at 17:31

## 2024-01-29 RX ADMIN — METHYLPREDNISOLONE SODIUM SUCCINATE 40 MG: 40 INJECTION, POWDER, FOR SOLUTION INTRAMUSCULAR; INTRAVENOUS at 14:00

## 2024-01-29 RX ADMIN — NYSTATIN 500000 UNITS: 100000 SUSPENSION ORAL at 21:04

## 2024-01-29 RX ADMIN — POLYETHYLENE GLYCOL 3350 17 G: 17 POWDER, FOR SOLUTION ORAL at 09:24

## 2024-01-29 RX ADMIN — MAGNESIUM OXIDE TAB 400 MG (241.3 MG ELEMENTAL MG) 400 MG: 400 (241.3 MG) TAB at 09:24

## 2024-01-29 RX ADMIN — FORMOTEROL FUMARATE 20 MCG: 20 SOLUTION RESPIRATORY (INHALATION) at 20:30

## 2024-01-29 RX ADMIN — METHYLPREDNISOLONE SODIUM SUCCINATE 40 MG: 40 INJECTION, POWDER, FOR SOLUTION INTRAMUSCULAR; INTRAVENOUS at 06:55

## 2024-01-29 RX ADMIN — BUDESONIDE INHALATION 0.5 MG: 0.5 SUSPENSION RESPIRATORY (INHALATION) at 06:17

## 2024-01-29 ASSESSMENT — COGNITIVE AND FUNCTIONAL STATUS - GENERAL
DAILY ACTIVITIY SCORE: 24
DAILY ACTIVITIY SCORE: 24
MOBILITY SCORE: 24
MOBILITY SCORE: 24

## 2024-01-29 ASSESSMENT — PAIN - FUNCTIONAL ASSESSMENT: PAIN_FUNCTIONAL_ASSESSMENT: 0-10

## 2024-01-29 ASSESSMENT — PAIN SCALES - GENERAL
PAINLEVEL_OUTOF10: 0 - NO PAIN
PAINLEVEL_OUTOF10: 0 - NO PAIN

## 2024-01-29 NOTE — PROGRESS NOTES
Rema Silver is a 48 y.o. female on day 1 of admission presenting with Severe persistent asthma with exacerbation.    Subjective   Patient seen and examined at bedside this morning.  Stated that her dyspnea and wheezing continues to improve since admission.  No acute events overnight.  Still complains of mild dyspnea especially exertional, asymptomatic otherwise at the time of the evaluation.          Objective     Physical Exam  Constitutional:       General: She is not in acute distress.     Appearance: She is obese. She is not ill-appearing or toxic-appearing.   HENT:      Mouth/Throat:      Mouth: Mucous membranes are moist.      Pharynx: Oropharynx is clear.   Eyes:      Conjunctiva/sclera: Conjunctivae normal.   Cardiovascular:      Rate and Rhythm: Regular rhythm.     Heart sounds: Normal heart sounds.   Pulmonary:      Effort: Pulmonary effort is normal with episodes of mild tachypnea. No respiratory distress.      Breath sounds: Mild expiratory wheezing  Abdominal:      General: Abdomen is flat. Bowel sounds are normal.   Musculoskeletal:      Right lower leg: No edema.      Left lower leg: No edema.   Skin:     General: Skin is warm and dry.   Neurological:      General: No focal deficit present.      Mental Status: She is alert. Mental status is at baseline.   Psychiatric:         Mood and Affect: Mood normal.         Behavior: Behavior normal.         Thought Content: Thought content normal.         Judgment: Judgment normal.     Last Recorded Vitals  Blood pressure 122/70, pulse (!) 117, temperature 36.1 °C (97 °F), temperature source Temporal, resp. rate 20, height 1.524 m (5'), weight 102 kg (224 lb), SpO2 98 %, peak flow 350 L/min.  Intake/Output last 3 Shifts:  I/O last 3 completed shifts:  In: 480 (4.7 mL/kg) [P.O.:480]  Out: - (0 mL/kg)   Weight: 101.6 kg     Relevant Results  Scheduled medications  budesonide, 0.5 mg, nebulization, BID  enoxaparin, 40 mg, subcutaneous, q12h  EVELYN  formoterol, 20 mcg, nebulization, BID  ipratropium-albuteroL, 3 mL, nebulization, q4h  magnesium oxide, 400 mg, oral, Daily  methylPREDNISolone sodium succinate (PF), 40 mg, intravenous, q8h  montelukast, 10 mg, oral, Nightly  nystatin, 500,000 Units, oral, 4x daily  pantoprazole, 40 mg, oral, Daily before breakfast  polyethylene glycol, 17 g, oral, BID      Continuous medications     PRN medications  PRN medications: acetaminophen-caffeine, cetirizine, ipratropium-albuteroL  Results from last 7 days   Lab Units 01/29/24  0657 01/28/24  0708 01/27/24  1818   WBC AUTO x10*3/uL 20.9* 17.2* 13.4*   RBC AUTO x10*6/uL 4.69 4.47 4.58   HEMOGLOBIN g/dL 14.2 13.4 14.0       Results from last 7 days   Lab Units 01/29/24  0657 01/28/24  0708 01/27/24  1818   SODIUM mmol/L 137 136 135*   POTASSIUM mmol/L 4.3 4.1 5.0   CHLORIDE mmol/L 100 104 103   CO2 mmol/L 20* 20* 22   BUN mg/dL 15 14 16   CREATININE mg/dL 0.66 0.62 0.74   CALCIUM mg/dL 9.8 9.8 9.8   PHOSPHORUS mg/dL 3.5 2.7  --    MAGNESIUM mg/dL 2.21 2.10  --    BILIRUBIN TOTAL mg/dL  --   --  0.5   ALT U/L  --   --  39   AST U/L  --   --  45*         CT angio chest for pulmonary embolism    Result Date: 1/27/2024  Interpreted By:  Ned Johnson, STUDY: CT ANGIO CHEST FOR PULMONARY EMBOLISM;  1/27/2024 8:58 pm   INDICATION: Signs/Symptoms:sob.   COMPARISON: None.   ACCESSION NUMBER(S): [id number]   ORDERING CLINICIAN: MELY RIOS   TECHNIQUE: Contiguous axial images of the chest were obtained after the intravenous administration of  contrast. Coronal and sagittal reformatted images were obtained from the axial images. MIPS were also performed and reviewed and confirmed the findings of the examination.   FINDINGS: No axillary, mediastinal, or hilar lymphadenopathy.   The heart is normal in size. There is common origin of the right brachiocephalic and left common carotid artery, an anatomic variant. No significant pericardial effusion. No evidence of acute central,  main, lobar or proximal segmental pulmonary embolism.   Mild bibasilar atelectasis. No significant pleural effusion. No pneumothorax.   Limited evaluation of the upper abdomen. Hepatic steatosis.   Multilevel degenerative change of the thoracic spine.       No evidence of acute pulmonary embolism.   Mild bibasilar subsegmental atelectasis.   MACRO: None   Signed by: Ned Johnson 1/27/2024 9:31 PM Dictation workstation:   [id number]    CT sinus w IV contrast    Result Date: 1/27/2024  Interpreted By:  Ned Johnson, STUDY: CT SINUS W IV CONTRAST;  1/27/2024 8:58 pm   INDICATION: Signs/Symptoms:congestion.   COMPARISON: None   ACCESSION NUMBER(S): [id number]   ORDERING CLINICIAN: MELY RIOS   TECHNIQUE: Contiguous axial images of the paranasal sinuses were obtained after the intravenous administration of contrast. Coronal reformatted images were obtained from the axial images.   FINDINGS: The paranasal sinuses are clear and well pneumatized. No sinus air-fluid level. The mastoid air cells are also clear and well pneumatized. No evidence of acute displaced maxillofacial fracture. No evidence of pathologic enhancement.       No significant mucosal thickening of the paranasal sinuses.   MACRO: None   Signed by: Ned Johnson 1/27/2024 9:29 PM Dictation workstation:   [id number]    XR chest 1 view    Result Date: 1/27/2024  Interpreted By:  Salvador Bettencourt, STUDY: XR CHEST 1 VIEW  1/27/2024 6:47 pm   INDICATION: Signs/Symptoms:Chest Pain   COMPARISON: 08/13/2023   ACCESSION NUMBER(S): [id number]   ORDERING CLINICIAN: MELY RIOS   TECHNIQUE: A single AP portable radiograph of the chest was obtained.   FINDINGS: No focal infiltrate, pleural effusion or pneumothorax is identified. The cardiac silhouette is within normal limits for size.       No focal infiltrate or pneumothorax is identified.   MACRO: None.   Signed by: Salvador Bettencourt 1/27/2024 7:15 PM Dictation workstation:   [id number]       Assessment/Plan    Principal Problem:    Severe persistent asthma with exacerbation  Rema Silver is a 48 y.o. female presenting with asthma exacerbation.  Past medical history significant for severe persistent asthma (tezepelumab-ekko monthly), vocal cord dysfunction, fibromyalgia, chronic fatigue syndrome, pulmonary micro embolism after knee arthoplasty.  Patient presents to the ED for admission of prolonged exacerbation exacerbation since November 1.  Followed by pulmonology, Dr. Khan, and was instructed to report to the hospital for steroids and breathing treatment for control of current asthma exacerbation.     Plan    #Asthma exacerbation, severe persistent (tezepelumab-ekko monthly)  #h/o vocal cord paralysis  #oral candidiasis with concurrent inhaler use  Followed By Dr. Khan (per notes possible bronchopulmonary thermoplasty candidate at some point in the future)  Chest x-ray and CT negative for acute process  -125 mg of Solu-Medrol given in ED, continue Solu-Medrol 40 mg IV 3 times daily  - DuoNebs q4h scheduled, and PRN  - Nystain swish and swallow  - Singular   - Pulm consult, Dr. Khan and the pulmonary team are on board.  -Peak respiratory flow meter 3 times daily with regarding of the values for comparison/disease progression/control     #Leukocytosis  Likely secondary to prolonged steroid use  - Daily CBC     #Fibromyalgia  #Chronic fatigue  - continue home meds     DVT: Lovenox  CODE: FULL  Disposition: 3-4 days for exacerbation control with pulmonary on consult             Discussed with attending,  Ketan Perez M.D. PGY-2  Internal Medicine    This note has been transcribed using Dragon voice recognition system and there is a possibility of unintentional typing misprints.  Any information found to be copied from previous providers is done in the best interest of the patient to provide accurate, quality, and continuity of care.

## 2024-01-29 NOTE — NURSING NOTE
Peak flows checked pre/post bronchodilator administration.    L/min pre tx   L/min post tx.    See Assessment flow sheet for regular Peak Flow charting.

## 2024-01-29 NOTE — CARE PLAN
The patient's goals for the shift include    Problem: Pain  Goal: My pain/discomfort is manageable  Outcome: Progressing     Problem: Safety  Goal: Patient will be injury free during hospitalization  Outcome: Progressing  Goal: I will remain free of falls  Outcome: Progressing     Problem: Daily Care  Goal: Daily care needs are met  Outcome: Progressing     Problem: Psychosocial Needs  Goal: Demonstrates ability to cope with hospitalization/illness  Outcome: Progressing  Goal: Collaborate with me, my family, and caregiver to identify my specific goals  Outcome: Progressing     Problem: Discharge Barriers  Goal: My discharge needs are met  Outcome: Progressing     Problem: Fall/Injury  Goal: Not fall by end of shift  Outcome: Progressing  Goal: Be free from injury by end of the shift  Outcome: Progressing  Goal: Verbalize understanding of personal risk factors for fall in the hospital  Outcome: Progressing  Goal: Verbalize understanding of risk factor reduction measures to prevent injury from fall in the home  Outcome: Progressing  Goal: Use assistive devices by end of the shift  Outcome: Progressing  Goal: Pace activities to prevent fatigue by end of the shift  Outcome: Progressing       The clinical goals for the shift include be free of resp distress this shift

## 2024-01-30 LAB
ALBUMIN SERPL BCP-MCNC: 4.6 G/DL (ref 3.4–5)
ANION GAP SERPL CALC-SCNC: 15 MMOL/L (ref 10–20)
BUN SERPL-MCNC: 15 MG/DL (ref 6–23)
CALCIUM SERPL-MCNC: 9.9 MG/DL (ref 8.6–10.3)
CHLORIDE SERPL-SCNC: 102 MMOL/L (ref 98–107)
CO2 SERPL-SCNC: 23 MMOL/L (ref 21–32)
CREAT SERPL-MCNC: 0.62 MG/DL (ref 0.5–1.05)
EGFRCR SERPLBLD CKD-EPI 2021: >90 ML/MIN/1.73M*2
ERYTHROCYTE [DISTWIDTH] IN BLOOD BY AUTOMATED COUNT: 13.2 % (ref 11.5–14.5)
GLUCOSE SERPL-MCNC: 117 MG/DL (ref 74–99)
HCT VFR BLD AUTO: 44 % (ref 36–46)
HGB BLD-MCNC: 14 G/DL (ref 12–16)
MAGNESIUM SERPL-MCNC: 2.18 MG/DL (ref 1.6–2.4)
MCH RBC QN AUTO: 30.2 PG (ref 26–34)
MCHC RBC AUTO-ENTMCNC: 31.8 G/DL (ref 32–36)
MCV RBC AUTO: 95 FL (ref 80–100)
NRBC BLD-RTO: 0 /100 WBCS (ref 0–0)
PHOSPHATE SERPL-MCNC: 4 MG/DL (ref 2.5–4.9)
PLATELET # BLD AUTO: 326 X10*3/UL (ref 150–450)
POTASSIUM SERPL-SCNC: 4 MMOL/L (ref 3.5–5.3)
RBC # BLD AUTO: 4.63 X10*6/UL (ref 4–5.2)
SODIUM SERPL-SCNC: 136 MMOL/L (ref 136–145)
WBC # BLD AUTO: 21.2 X10*3/UL (ref 4.4–11.3)

## 2024-01-30 PROCEDURE — 80069 RENAL FUNCTION PANEL: CPT

## 2024-01-30 PROCEDURE — 2500000004 HC RX 250 GENERAL PHARMACY W/ HCPCS (ALT 636 FOR OP/ED)

## 2024-01-30 PROCEDURE — 2500000002 HC RX 250 W HCPCS SELF ADMINISTERED DRUGS (ALT 637 FOR MEDICARE OP, ALT 636 FOR OP/ED): Performed by: INTERNAL MEDICINE

## 2024-01-30 PROCEDURE — 2500000001 HC RX 250 WO HCPCS SELF ADMINISTERED DRUGS (ALT 637 FOR MEDICARE OP)

## 2024-01-30 PROCEDURE — 2500000004 HC RX 250 GENERAL PHARMACY W/ HCPCS (ALT 636 FOR OP/ED): Performed by: INTERNAL MEDICINE

## 2024-01-30 PROCEDURE — 94640 AIRWAY INHALATION TREATMENT: CPT

## 2024-01-30 PROCEDURE — 99233 SBSQ HOSP IP/OBS HIGH 50: CPT

## 2024-01-30 PROCEDURE — 83735 ASSAY OF MAGNESIUM: CPT

## 2024-01-30 PROCEDURE — 36415 COLL VENOUS BLD VENIPUNCTURE: CPT

## 2024-01-30 PROCEDURE — 1100000001 HC PRIVATE ROOM DAILY

## 2024-01-30 PROCEDURE — 2500000002 HC RX 250 W HCPCS SELF ADMINISTERED DRUGS (ALT 637 FOR MEDICARE OP, ALT 636 FOR OP/ED)

## 2024-01-30 PROCEDURE — 85027 COMPLETE CBC AUTOMATED: CPT

## 2024-01-30 RX ADMIN — IPRATROPIUM BROMIDE AND ALBUTEROL SULFATE 3 ML: 2.5; .5 SOLUTION RESPIRATORY (INHALATION) at 20:16

## 2024-01-30 RX ADMIN — IPRATROPIUM BROMIDE AND ALBUTEROL SULFATE 3 ML: 2.5; .5 SOLUTION RESPIRATORY (INHALATION) at 11:59

## 2024-01-30 RX ADMIN — METHYLPREDNISOLONE SODIUM SUCCINATE 40 MG: 40 INJECTION, POWDER, FOR SOLUTION INTRAMUSCULAR; INTRAVENOUS at 06:06

## 2024-01-30 RX ADMIN — BUDESONIDE INHALATION 0.5 MG: 0.5 SUSPENSION RESPIRATORY (INHALATION) at 08:44

## 2024-01-30 RX ADMIN — PANTOPRAZOLE SODIUM 40 MG: 40 TABLET, DELAYED RELEASE ORAL at 06:07

## 2024-01-30 RX ADMIN — POLYETHYLENE GLYCOL 3350 17 G: 17 POWDER, FOR SOLUTION ORAL at 21:15

## 2024-01-30 RX ADMIN — METHYLPREDNISOLONE SODIUM SUCCINATE 40 MG: 40 INJECTION, POWDER, FOR SOLUTION INTRAMUSCULAR; INTRAVENOUS at 14:13

## 2024-01-30 RX ADMIN — IPRATROPIUM BROMIDE AND ALBUTEROL SULFATE 3 ML: 2.5; .5 SOLUTION RESPIRATORY (INHALATION) at 04:59

## 2024-01-30 RX ADMIN — MONTELUKAST 10 MG: 10 TABLET, FILM COATED ORAL at 21:15

## 2024-01-30 RX ADMIN — METHYLPREDNISOLONE SODIUM SUCCINATE 40 MG: 40 INJECTION, POWDER, FOR SOLUTION INTRAMUSCULAR; INTRAVENOUS at 21:16

## 2024-01-30 RX ADMIN — NYSTATIN 500000 UNITS: 100000 SUSPENSION ORAL at 06:07

## 2024-01-30 RX ADMIN — MAGNESIUM OXIDE TAB 400 MG (241.3 MG ELEMENTAL MG) 400 MG: 400 (241.3 MG) TAB at 09:07

## 2024-01-30 RX ADMIN — NYSTATIN 500000 UNITS: 100000 SUSPENSION ORAL at 14:13

## 2024-01-30 RX ADMIN — NYSTATIN 500000 UNITS: 100000 SUSPENSION ORAL at 17:43

## 2024-01-30 RX ADMIN — POLYETHYLENE GLYCOL 3350 17 G: 17 POWDER, FOR SOLUTION ORAL at 09:07

## 2024-01-30 RX ADMIN — NYSTATIN 500000 UNITS: 100000 SUSPENSION ORAL at 21:15

## 2024-01-30 RX ADMIN — IPRATROPIUM BROMIDE AND ALBUTEROL SULFATE 3 ML: 2.5; .5 SOLUTION RESPIRATORY (INHALATION) at 08:44

## 2024-01-30 RX ADMIN — FORMOTEROL FUMARATE 20 MCG: 20 SOLUTION RESPIRATORY (INHALATION) at 20:16

## 2024-01-30 RX ADMIN — IPRATROPIUM BROMIDE AND ALBUTEROL SULFATE 3 ML: 2.5; .5 SOLUTION RESPIRATORY (INHALATION) at 15:22

## 2024-01-30 RX ADMIN — BUDESONIDE INHALATION 0.5 MG: 0.5 SUSPENSION RESPIRATORY (INHALATION) at 20:16

## 2024-01-30 RX ADMIN — FORMOTEROL FUMARATE 20 MCG: 20 SOLUTION RESPIRATORY (INHALATION) at 08:44

## 2024-01-30 ASSESSMENT — COGNITIVE AND FUNCTIONAL STATUS - GENERAL
DAILY ACTIVITIY SCORE: 24
MOBILITY SCORE: 24

## 2024-01-30 ASSESSMENT — PAIN SCALES - GENERAL
PAINLEVEL_OUTOF10: 0 - NO PAIN
PAINLEVEL_OUTOF10: 0 - NO PAIN

## 2024-01-30 ASSESSMENT — PAIN - FUNCTIONAL ASSESSMENT: PAIN_FUNCTIONAL_ASSESSMENT: 0-10

## 2024-01-30 NOTE — PROGRESS NOTES
Rema Silver is a 48 y.o. female on day 2 of admission presenting with Severe persistent asthma with exacerbation.    Subjective   Patient seen and examined at bedside this morning.  Stated that her dyspnea and wheezing continues to improve since admission but she feels overall fatigued.  No acute events overnight. asymptomatic otherwise at the time of the evaluation.   Patient declined using Lovenox patient understands his DVT prophylaxis, she has been moving around.       Objective     Physical Exam  Constitutional:       General: She is not in acute distress.     Appearance: She is obese. She is not ill-appearing or toxic-appearing.   HENT:      Mouth/Throat:      Mouth: Mucous membranes are moist.      Pharynx: Oropharynx is clear.   Eyes:      Conjunctiva/sclera: Conjunctivae normal.   Cardiovascular:      Rate and Rhythm: Regular rhythm.     Heart sounds: Normal heart sounds.   Pulmonary:      Effort: Pulmonary effort is normal with episodes of mild tachypnea. No respiratory distress.      Breath sounds: Mild expiratory wheezing  Abdominal:      General: Abdomen is flat. Bowel sounds are normal.   Musculoskeletal:      Right lower leg: No edema.      Left lower leg: No edema.   Skin:     General: Skin is warm and dry.   Neurological:      General: No focal deficit present.      Mental Status: She is alert. Mental status is at baseline.   Psychiatric:         Mood and Affect: Mood normal.         Behavior: Behavior normal.         Thought Content: Thought content normal.         Judgment: Judgment normal.     Last Recorded Vitals  Blood pressure 143/72, pulse 94, temperature 35.7 °C (96.3 °F), temperature source Temporal, resp. rate 18, height 1.524 m (5'), weight 102 kg (224 lb), SpO2 97 %, peak flow 350 L/min.  Intake/Output last 3 Shifts:  I/O last 3 completed shifts:  In: 480 (4.7 mL/kg) [P.O.:480]  Out: - (0 mL/kg)   Weight: 101.6 kg     Relevant Results  Scheduled medications  budesonide, 0.5 mg,  nebulization, BID  enoxaparin, 40 mg, subcutaneous, q12h EVELYN  formoterol, 20 mcg, nebulization, BID  ipratropium-albuteroL, 3 mL, nebulization, q4h  magnesium oxide, 400 mg, oral, Daily  methylPREDNISolone sodium succinate (PF), 40 mg, intravenous, q8h  montelukast, 10 mg, oral, Nightly  nystatin, 500,000 Units, oral, 4x daily  pantoprazole, 40 mg, oral, Daily before breakfast  polyethylene glycol, 17 g, oral, BID      Continuous medications     PRN medications  PRN medications: acetaminophen-caffeine, cetirizine, ipratropium-albuteroL  Results from last 7 days   Lab Units 01/30/24  0705 01/29/24  0657 01/28/24  0708   WBC AUTO x10*3/uL 21.2* 20.9* 17.2*   RBC AUTO x10*6/uL 4.63 4.69 4.47   HEMOGLOBIN g/dL 14.0 14.2 13.4       Results from last 7 days   Lab Units 01/30/24  0705 01/29/24  0657 01/28/24  0708 01/27/24  1818   SODIUM mmol/L 136 137 136 135*   POTASSIUM mmol/L 4.0 4.3 4.1 5.0   CHLORIDE mmol/L 102 100 104 103   CO2 mmol/L 23 20* 20* 22   BUN mg/dL 15 15 14 16   CREATININE mg/dL 0.62 0.66 0.62 0.74   CALCIUM mg/dL 9.9 9.8 9.8 9.8   PHOSPHORUS mg/dL 4.0 3.5 2.7  --    MAGNESIUM mg/dL 2.18 2.21 2.10  --    BILIRUBIN TOTAL mg/dL  --   --   --  0.5   ALT U/L  --   --   --  39   AST U/L  --   --   --  45*         CT angio chest for pulmonary embolism    Result Date: 1/27/2024  Interpreted By:  Ned Johnson, STUDY: CT ANGIO CHEST FOR PULMONARY EMBOLISM;  1/27/2024 8:58 pm   INDICATION: Signs/Symptoms:sob.   COMPARISON: None.   ACCESSION NUMBER(S): [id number]   ORDERING CLINICIAN: MELY RIOS   TECHNIQUE: Contiguous axial images of the chest were obtained after the intravenous administration of  contrast. Coronal and sagittal reformatted images were obtained from the axial images. MIPS were also performed and reviewed and confirmed the findings of the examination.   FINDINGS: No axillary, mediastinal, or hilar lymphadenopathy.   The heart is normal in size. There is common origin of the right  brachiocephalic and left common carotid artery, an anatomic variant. No significant pericardial effusion. No evidence of acute central, main, lobar or proximal segmental pulmonary embolism.   Mild bibasilar atelectasis. No significant pleural effusion. No pneumothorax.   Limited evaluation of the upper abdomen. Hepatic steatosis.   Multilevel degenerative change of the thoracic spine.       No evidence of acute pulmonary embolism.   Mild bibasilar subsegmental atelectasis.   MACRO: None   Signed by: Ned Johnson 1/27/2024 9:31 PM Dictation workstation:   [id number]    CT sinus w IV contrast    Result Date: 1/27/2024  Interpreted By:  Ned Johnson, STUDY: CT SINUS W IV CONTRAST;  1/27/2024 8:58 pm   INDICATION: Signs/Symptoms:congestion.   COMPARISON: None   ACCESSION NUMBER(S): [id number]   ORDERING CLINICIAN: MELY RIOS   TECHNIQUE: Contiguous axial images of the paranasal sinuses were obtained after the intravenous administration of contrast. Coronal reformatted images were obtained from the axial images.   FINDINGS: The paranasal sinuses are clear and well pneumatized. No sinus air-fluid level. The mastoid air cells are also clear and well pneumatized. No evidence of acute displaced maxillofacial fracture. No evidence of pathologic enhancement.       No significant mucosal thickening of the paranasal sinuses.   MACRO: None   Signed by: Ned Johnson 1/27/2024 9:29 PM Dictation workstation:   [id number]    XR chest 1 view    Result Date: 1/27/2024  Interpreted By:  Salvador Bettencourt, STUDY: XR CHEST 1 VIEW  1/27/2024 6:47 pm   INDICATION: Signs/Symptoms:Chest Pain   COMPARISON: 08/13/2023   ACCESSION NUMBER(S): [id number]   ORDERING CLINICIAN: MELY RIOS   TECHNIQUE: A single AP portable radiograph of the chest was obtained.   FINDINGS: No focal infiltrate, pleural effusion or pneumothorax is identified. The cardiac silhouette is within normal limits for size.       No focal infiltrate or pneumothorax is  identified.   MACRO: None.   Signed by: Salvador Bettencourt 1/27/2024 7:15 PM Dictation workstation:   [id number]       Assessment/Plan   Principal Problem:    Severe persistent asthma with exacerbation  Rema Silver is a 48 y.o. female presenting with asthma exacerbation.  Past medical history significant for severe persistent asthma (tezepelumab-ekko monthly), vocal cord dysfunction, fibromyalgia, chronic fatigue syndrome, pulmonary micro embolism after knee arthoplasty.  Patient presents to the ED for admission of prolonged exacerbation exacerbation since November 1.  Followed by pulmonology, Dr. Khan, and was instructed to report to the hospital for steroids and breathing treatment for control of current asthma exacerbation.     Plan    #Asthma exacerbation, severe persistent (tezepelumab-ekko monthly)-gradually improving  #h/o vocal cord paralysis  #oral candidiasis with concurrent inhaler use  Followed By Dr. Khan (per notes possible bronchopulmonary thermoplasty candidate at some point in the future)  Chest x-ray and CT negative for acute process  -125 mg of Solu-Medrol given in ED, continue Solu-Medrol 40 mg IV 3 times daily  - DuoNebs q4h scheduled, and PRN  - Nystain swish and swallow  - Singular   - Pulm consult, Dr. Khan and the pulmonary team are on board.  -Peak respiratory flow meter 3 times daily with regarding of the values for comparison/disease progression/control     #Leukocytosis  Likely secondary to prolonged steroid use  - Daily CBC     #Fibromyalgia  #Chronic fatigue  - continue home meds     DVT: Ambulation (patient declined to take Lovenox)  CODE: FULL  Disposition: 3-4 days for exacerbation control with pulmonary on consult             Discussed with attending,  Ketan Perez M.D. PGY-2  Internal Medicine    This note has been transcribed using Dragon voice recognition system and there is a possibility of unintentional typing misprints.  Any information found to be copied from previous  providers is done in the best interest of the patient to provide accurate, quality, and continuity of care.

## 2024-01-30 NOTE — CARE PLAN
The patient's goals for the shift include Good night's sleep    The clinical goals for the shift include Comfort and rest    Over the shift, the patient did not make progress toward the following goals. Barriers to progression include acute illness. Recommendations to address these barriers include provide a quiet environment.

## 2024-01-30 NOTE — PROGRESS NOTES
Spiritual Care Visit    Clinical Encounter Type  Visited With: Patient  Routine Visit: Introduction  Continue Visiting: No                                            Taxonomy  Intended Effects: Promote sense of peace, Preserve dignity and respect  Methods: Offer spiritual/Buddhism support  Interventions: Share words of hope and inspiration    Patient recognized the ayana from a  previous visit.  Patient was working on her laptop and said she likes to stay busy.   was a supportive presence.

## 2024-01-31 LAB
ALBUMIN SERPL BCP-MCNC: 4.6 G/DL (ref 3.4–5)
ANION GAP SERPL CALC-SCNC: 16 MMOL/L (ref 10–20)
BUN SERPL-MCNC: 16 MG/DL (ref 6–23)
CALCIUM SERPL-MCNC: 10 MG/DL (ref 8.6–10.3)
CHLORIDE SERPL-SCNC: 100 MMOL/L (ref 98–107)
CO2 SERPL-SCNC: 23 MMOL/L (ref 21–32)
CREAT SERPL-MCNC: 0.65 MG/DL (ref 0.5–1.05)
EGFRCR SERPLBLD CKD-EPI 2021: >90 ML/MIN/1.73M*2
ERYTHROCYTE [DISTWIDTH] IN BLOOD BY AUTOMATED COUNT: 13.2 % (ref 11.5–14.5)
GLUCOSE SERPL-MCNC: 110 MG/DL (ref 74–99)
HCT VFR BLD AUTO: 44.7 % (ref 36–46)
HGB BLD-MCNC: 14.5 G/DL (ref 12–16)
MAGNESIUM SERPL-MCNC: 2.26 MG/DL (ref 1.6–2.4)
MCH RBC QN AUTO: 30.5 PG (ref 26–34)
MCHC RBC AUTO-ENTMCNC: 32.4 G/DL (ref 32–36)
MCV RBC AUTO: 94 FL (ref 80–100)
NRBC BLD-RTO: 0 /100 WBCS (ref 0–0)
PHOSPHATE SERPL-MCNC: 3.9 MG/DL (ref 2.5–4.9)
PLATELET # BLD AUTO: 331 X10*3/UL (ref 150–450)
POTASSIUM SERPL-SCNC: 4.2 MMOL/L (ref 3.5–5.3)
RBC # BLD AUTO: 4.76 X10*6/UL (ref 4–5.2)
SODIUM SERPL-SCNC: 135 MMOL/L (ref 136–145)
WBC # BLD AUTO: 19.9 X10*3/UL (ref 4.4–11.3)

## 2024-01-31 PROCEDURE — 94640 AIRWAY INHALATION TREATMENT: CPT

## 2024-01-31 PROCEDURE — 99233 SBSQ HOSP IP/OBS HIGH 50: CPT

## 2024-01-31 PROCEDURE — 1100000001 HC PRIVATE ROOM DAILY

## 2024-01-31 PROCEDURE — 2500000001 HC RX 250 WO HCPCS SELF ADMINISTERED DRUGS (ALT 637 FOR MEDICARE OP)

## 2024-01-31 PROCEDURE — 2500000004 HC RX 250 GENERAL PHARMACY W/ HCPCS (ALT 636 FOR OP/ED)

## 2024-01-31 PROCEDURE — 2500000002 HC RX 250 W HCPCS SELF ADMINISTERED DRUGS (ALT 637 FOR MEDICARE OP, ALT 636 FOR OP/ED)

## 2024-01-31 PROCEDURE — 36415 COLL VENOUS BLD VENIPUNCTURE: CPT

## 2024-01-31 PROCEDURE — 80069 RENAL FUNCTION PANEL: CPT

## 2024-01-31 PROCEDURE — 2500000002 HC RX 250 W HCPCS SELF ADMINISTERED DRUGS (ALT 637 FOR MEDICARE OP, ALT 636 FOR OP/ED): Performed by: INTERNAL MEDICINE

## 2024-01-31 PROCEDURE — 85027 COMPLETE CBC AUTOMATED: CPT

## 2024-01-31 PROCEDURE — 83735 ASSAY OF MAGNESIUM: CPT

## 2024-01-31 PROCEDURE — 2500000004 HC RX 250 GENERAL PHARMACY W/ HCPCS (ALT 636 FOR OP/ED): Performed by: INTERNAL MEDICINE

## 2024-01-31 RX ORDER — IPRATROPIUM BROMIDE AND ALBUTEROL SULFATE 2.5; .5 MG/3ML; MG/3ML
3 SOLUTION RESPIRATORY (INHALATION)
Status: DISCONTINUED | OUTPATIENT
Start: 2024-01-31 | End: 2024-01-31

## 2024-01-31 RX ORDER — IPRATROPIUM BROMIDE AND ALBUTEROL SULFATE 2.5; .5 MG/3ML; MG/3ML
3 SOLUTION RESPIRATORY (INHALATION)
Status: DISCONTINUED | OUTPATIENT
Start: 2024-02-01 | End: 2024-02-01

## 2024-01-31 RX ADMIN — POLYETHYLENE GLYCOL 3350 17 G: 17 POWDER, FOR SOLUTION ORAL at 21:00

## 2024-01-31 RX ADMIN — IPRATROPIUM BROMIDE AND ALBUTEROL SULFATE 3 ML: 2.5; .5 SOLUTION RESPIRATORY (INHALATION) at 08:07

## 2024-01-31 RX ADMIN — METHYLPREDNISOLONE SODIUM SUCCINATE 40 MG: 40 INJECTION, POWDER, FOR SOLUTION INTRAMUSCULAR; INTRAVENOUS at 21:00

## 2024-01-31 RX ADMIN — IPRATROPIUM BROMIDE AND ALBUTEROL SULFATE 3 ML: 2.5; .5 SOLUTION RESPIRATORY (INHALATION) at 19:48

## 2024-01-31 RX ADMIN — NYSTATIN 500000 UNITS: 100000 SUSPENSION ORAL at 21:00

## 2024-01-31 RX ADMIN — FORMOTEROL FUMARATE 20 MCG: 20 SOLUTION RESPIRATORY (INHALATION) at 19:48

## 2024-01-31 RX ADMIN — METHYLPREDNISOLONE SODIUM SUCCINATE 40 MG: 40 INJECTION, POWDER, FOR SOLUTION INTRAMUSCULAR; INTRAVENOUS at 06:50

## 2024-01-31 RX ADMIN — IPRATROPIUM BROMIDE AND ALBUTEROL SULFATE 3 ML: 2.5; .5 SOLUTION RESPIRATORY (INHALATION) at 12:20

## 2024-01-31 RX ADMIN — IPRATROPIUM BROMIDE AND ALBUTEROL SULFATE 3 ML: 2.5; .5 SOLUTION RESPIRATORY (INHALATION) at 04:13

## 2024-01-31 RX ADMIN — POLYETHYLENE GLYCOL 3350 17 G: 17 POWDER, FOR SOLUTION ORAL at 08:55

## 2024-01-31 RX ADMIN — BUDESONIDE INHALATION 0.5 MG: 0.5 SUSPENSION RESPIRATORY (INHALATION) at 08:07

## 2024-01-31 RX ADMIN — MONTELUKAST 10 MG: 10 TABLET, FILM COATED ORAL at 21:00

## 2024-01-31 RX ADMIN — MAGNESIUM OXIDE TAB 400 MG (241.3 MG ELEMENTAL MG) 400 MG: 400 (241.3 MG) TAB at 08:55

## 2024-01-31 RX ADMIN — NYSTATIN 500000 UNITS: 100000 SUSPENSION ORAL at 16:37

## 2024-01-31 RX ADMIN — PANTOPRAZOLE SODIUM 40 MG: 40 TABLET, DELAYED RELEASE ORAL at 06:50

## 2024-01-31 RX ADMIN — METHYLPREDNISOLONE SODIUM SUCCINATE 40 MG: 40 INJECTION, POWDER, FOR SOLUTION INTRAMUSCULAR; INTRAVENOUS at 13:52

## 2024-01-31 RX ADMIN — NYSTATIN 500000 UNITS: 100000 SUSPENSION ORAL at 06:50

## 2024-01-31 RX ADMIN — FORMOTEROL FUMARATE 20 MCG: 20 SOLUTION RESPIRATORY (INHALATION) at 08:07

## 2024-01-31 RX ADMIN — IPRATROPIUM BROMIDE AND ALBUTEROL SULFATE 3 ML: 2.5; .5 SOLUTION RESPIRATORY (INHALATION) at 00:40

## 2024-01-31 RX ADMIN — NYSTATIN 500000 UNITS: 100000 SUSPENSION ORAL at 13:52

## 2024-01-31 RX ADMIN — BUDESONIDE INHALATION 0.5 MG: 0.5 SUSPENSION RESPIRATORY (INHALATION) at 19:48

## 2024-01-31 RX ADMIN — IPRATROPIUM BROMIDE AND ALBUTEROL SULFATE 3 ML: 2.5; .5 SOLUTION RESPIRATORY (INHALATION) at 16:23

## 2024-01-31 ASSESSMENT — COGNITIVE AND FUNCTIONAL STATUS - GENERAL
MOBILITY SCORE: 24
MOBILITY SCORE: 24
DAILY ACTIVITIY SCORE: 24
DAILY ACTIVITIY SCORE: 24

## 2024-01-31 ASSESSMENT — PAIN SCALES - GENERAL
PAINLEVEL_OUTOF10: 0 - NO PAIN

## 2024-01-31 ASSESSMENT — PAIN SCALES - WONG BAKER: WONGBAKER_NUMERICALRESPONSE: NO HURT

## 2024-01-31 NOTE — PROGRESS NOTES
Rema Silver is a 48 y.o. female on day 3 of admission presenting with Severe persistent asthma with exacerbation.    Subjective   Patient seen and examined at bedside this morning.  Remains stable and unchanged from the day prior.  No acute events overnight. Asymptomatic otherwise at the time of the evaluation.        Objective     Physical Exam  Constitutional:       General: She is not in acute distress.     Appearance: She is obese. She is not ill-appearing or toxic-appearing.   HENT:      Mouth/Throat:      Mouth: Mucous membranes are moist.      Pharynx: Oropharynx is clear.   Eyes:      Conjunctiva/sclera: Conjunctivae normal.   Cardiovascular:      Rate and Rhythm: Regular rhythm.     Heart sounds: Normal heart sounds.   Pulmonary:      Effort: Pulmonary effort is normal with episodes of mild tachypnea. No respiratory distress.      Breath sounds: Mild expiratory wheezing-improving from prior  Abdominal:      General: Abdomen is flat. Bowel sounds are normal.   Musculoskeletal:      Right lower leg: No edema.      Left lower leg: No edema.   Skin:     General: Skin is warm and dry.   Neurological:      General: No focal deficit present.      Mental Status: She is alert. Mental status is at baseline.   Psychiatric:         Mood and Affect: Mood normal.         Behavior: Behavior normal.         Thought Content: Thought content normal.         Judgment: Judgment normal.     Last Recorded Vitals  Blood pressure 133/70, pulse (!) 114, temperature 35.9 °C (96.6 °F), temperature source Temporal, resp. rate 20, height 1.524 m (5'), weight 102 kg (224 lb), SpO2 99 %, peak flow 300 L/min.  Intake/Output last 3 Shifts:  I/O last 3 completed shifts:  In: 1340 (13.2 mL/kg) [P.O.:1340]  Out: - (0 mL/kg)   Weight: 101.6 kg     Relevant Results  Scheduled medications  budesonide, 0.5 mg, nebulization, BID  enoxaparin, 40 mg, subcutaneous, q12h EVELYN  formoterol, 20 mcg, nebulization, BID  ipratropium-albuteroL, 3  mL, nebulization, q4h  magnesium oxide, 400 mg, oral, Daily  methylPREDNISolone sodium succinate (PF), 40 mg, intravenous, q8h  montelukast, 10 mg, oral, Nightly  nystatin, 500,000 Units, oral, 4x daily  pantoprazole, 40 mg, oral, Daily before breakfast  polyethylene glycol, 17 g, oral, BID      Continuous medications     PRN medications  PRN medications: acetaminophen-caffeine, cetirizine, ipratropium-albuteroL  Results from last 7 days   Lab Units 01/31/24  0802 01/30/24  0705 01/29/24  0657   WBC AUTO x10*3/uL 19.9* 21.2* 20.9*   RBC AUTO x10*6/uL 4.76 4.63 4.69   HEMOGLOBIN g/dL 14.5 14.0 14.2       Results from last 7 days   Lab Units 01/31/24  0802 01/30/24  0705 01/29/24  0657 01/28/24  0708 01/27/24  1818   SODIUM mmol/L 135* 136 137   < > 135*   POTASSIUM mmol/L 4.2 4.0 4.3   < > 5.0   CHLORIDE mmol/L 100 102 100   < > 103   CO2 mmol/L 23 23 20*   < > 22   BUN mg/dL 16 15 15   < > 16   CREATININE mg/dL 0.65 0.62 0.66   < > 0.74   CALCIUM mg/dL 10.0 9.9 9.8   < > 9.8   PHOSPHORUS mg/dL 3.9 4.0 3.5   < >  --    MAGNESIUM mg/dL 2.26 2.18 2.21   < >  --    BILIRUBIN TOTAL mg/dL  --   --   --   --  0.5   ALT U/L  --   --   --   --  39   AST U/L  --   --   --   --  45*    < > = values in this interval not displayed.         CT angio chest for pulmonary embolism    Result Date: 1/27/2024  Interpreted By:  Ned Johnson, STUDY: CT ANGIO CHEST FOR PULMONARY EMBOLISM;  1/27/2024 8:58 pm   INDICATION: Signs/Symptoms:sob.   COMPARISON: None.   ACCESSION NUMBER(S): [id number]   ORDERING CLINICIAN: MELY RIOS   TECHNIQUE: Contiguous axial images of the chest were obtained after the intravenous administration of  contrast. Coronal and sagittal reformatted images were obtained from the axial images. MIPS were also performed and reviewed and confirmed the findings of the examination.   FINDINGS: No axillary, mediastinal, or hilar lymphadenopathy.   The heart is normal in size. There is common origin of the right  brachiocephalic and left common carotid artery, an anatomic variant. No significant pericardial effusion. No evidence of acute central, main, lobar or proximal segmental pulmonary embolism.   Mild bibasilar atelectasis. No significant pleural effusion. No pneumothorax.   Limited evaluation of the upper abdomen. Hepatic steatosis.   Multilevel degenerative change of the thoracic spine.       No evidence of acute pulmonary embolism.   Mild bibasilar subsegmental atelectasis.   MACRO: None   Signed by: Ned Johnson 1/27/2024 9:31 PM Dictation workstation:   [id number]    CT sinus w IV contrast    Result Date: 1/27/2024  Interpreted By:  Ned Johnson, STUDY: CT SINUS W IV CONTRAST;  1/27/2024 8:58 pm   INDICATION: Signs/Symptoms:congestion.   COMPARISON: None   ACCESSION NUMBER(S): [id number]   ORDERING CLINICIAN: MELY RIOS   TECHNIQUE: Contiguous axial images of the paranasal sinuses were obtained after the intravenous administration of contrast. Coronal reformatted images were obtained from the axial images.   FINDINGS: The paranasal sinuses are clear and well pneumatized. No sinus air-fluid level. The mastoid air cells are also clear and well pneumatized. No evidence of acute displaced maxillofacial fracture. No evidence of pathologic enhancement.       No significant mucosal thickening of the paranasal sinuses.   MACRO: None   Signed by: Ned Johnson 1/27/2024 9:29 PM Dictation workstation:   [id number]    XR chest 1 view    Result Date: 1/27/2024  Interpreted By:  Salvador Bettencourt, STUDY: XR CHEST 1 VIEW  1/27/2024 6:47 pm   INDICATION: Signs/Symptoms:Chest Pain   COMPARISON: 08/13/2023   ACCESSION NUMBER(S): [id number]   ORDERING CLINICIAN: MELY RIOS   TECHNIQUE: A single AP portable radiograph of the chest was obtained.   FINDINGS: No focal infiltrate, pleural effusion or pneumothorax is identified. The cardiac silhouette is within normal limits for size.       No focal infiltrate or pneumothorax is  identified.   MACRO: None.   Signed by: Salvador Bettencourt 1/27/2024 7:15 PM Dictation workstation:   [id number]       Assessment/Plan   Principal Problem:    Severe persistent asthma with exacerbation  Rema Silver is a 48 y.o. female presenting with asthma exacerbation.  Past medical history significant for severe persistent asthma (tezepelumab-ekko monthly), vocal cord dysfunction, fibromyalgia, chronic fatigue syndrome, pulmonary micro embolism after knee arthoplasty.  Patient presents to the ED for admission of prolonged exacerbation exacerbation since November 1.  Followed by pulmonology, Dr. Khan, and was instructed to report to the hospital for steroids and breathing treatment for control of current asthma exacerbation.     Plan    #Asthma exacerbation, severe persistent (tezepelumab-ekko monthly)-gradually improving  #h/o vocal cord paralysis  #oral candidiasis with concurrent inhaler use  Followed By Dr. Khan (per notes possible bronchopulmonary thermoplasty candidate at some point in the future)  Chest x-ray and CT negative for acute process  -125 mg of Solu-Medrol given in ED, continue Solu-Medrol 40 mg IV 3 times daily  - DuoNebs q4h scheduled, and PRN  - Nystain swish and swallow  - Singular   - Pulm consult, Dr. Khan and the pulmonary team are on board.  -Peak respiratory flow meter 3 times daily with regarding of the values for comparison/disease progression/control     #Leukocytosis  Likely secondary to prolonged steroid use  - Daily CBC     #Fibromyalgia  #Chronic fatigue  - continue home meds     DVT: Ambulation (patient declined to take Lovenox)  CODE: FULL  Disposition: 3-4 days for exacerbation control with pulmonary on consult             Discussed with attending,  Ketan Perez M.D. PGY-2  Internal Medicine    This note has been transcribed using Dragon voice recognition system and there is a possibility of unintentional typing misprints.  Any information found to be copied from previous  providers is done in the best interest of the patient to provide accurate, quality, and continuity of care.

## 2024-01-31 NOTE — CARE PLAN
Problem: Pain  Goal: My pain/discomfort is manageable  Outcome: Progressing     Problem: Safety  Goal: Patient will be injury free during hospitalization  Outcome: Progressing  Goal: I will remain free of falls  Outcome: Progressing     Problem: Daily Care  Goal: Daily care needs are met  Outcome: Progressing     Problem: Psychosocial Needs  Goal: Demonstrates ability to cope with hospitalization/illness  Outcome: Progressing  Goal: Collaborate with me, my family, and caregiver to identify my specific goals  Outcome: Progressing     Problem: Discharge Barriers  Goal: My discharge needs are met  Outcome: Progressing     Problem: Fall/Injury  Goal: Not fall by end of shift  Outcome: Progressing  Goal: Be free from injury by end of the shift  Outcome: Progressing  Goal: Verbalize understanding of personal risk factors for fall in the hospital  Outcome: Progressing  Goal: Verbalize understanding of risk factor reduction measures to prevent injury from fall in the home  Outcome: Progressing  Goal: Use assistive devices by end of the shift  Outcome: Progressing  Goal: Pace activities to prevent fatigue by end of the shift  Outcome: Progressing   The patient's goals for the shift include Good night's sleep    The clinical goals for the shift include Pt will remain free from fall/injury

## 2024-01-31 NOTE — CARE PLAN
The patient's goals for the shift include   Problem: Pain  Goal: My pain/discomfort is manageable  1/31/2024 1035 by Julia Estrella RN  Outcome: Progressing  1/31/2024 1035 by Julia Estrella RN  Outcome: Progressing     Problem: Safety  Goal: Patient will be injury free during hospitalization  1/31/2024 1035 by Julia Estrella RN  Outcome: Progressing  1/31/2024 1035 by Julia Estrella RN  Outcome: Progressing  Goal: I will remain free of falls  1/31/2024 1035 by Julia Estrella RN  Outcome: Progressing  1/31/2024 1035 by Julia Estrella RN  Outcome: Progressing     Problem: Daily Care  Goal: Daily care needs are met  1/31/2024 1035 by Julia Estrella RN  Outcome: Progressing  1/31/2024 1035 by Julia Estrella RN  Outcome: Progressing     Problem: Psychosocial Needs  Goal: Demonstrates ability to cope with hospitalization/illness  1/31/2024 1035 by Julia Estrella RN  Outcome: Progressing  1/31/2024 1035 by Julia Estrella RN  Outcome: Progressing  Goal: Collaborate with me, my family, and caregiver to identify my specific goals  1/31/2024 1035 by Julia Estrella RN  Outcome: Progressing  1/31/2024 1035 by Julia Estrella RN  Outcome: Progressing     Problem: Discharge Barriers  Goal: My discharge needs are met  1/31/2024 1035 by Julia Estrella RN  Outcome: Progressing  1/31/2024 1035 by Julia Estrella RN  Outcome: Progressing     Problem: Fall/Injury  Goal: Not fall by end of shift  1/31/2024 1035 by Julia Estrella RN  Outcome: Progressing  1/31/2024 1035 by Julia Estrella RN  Outcome: Progressing  Goal: Be free from injury by end of the shift  1/31/2024 1035 by Julia Estrella RN  Outcome: Progressing  1/31/2024 1035 by Julia Estrella RN  Outcome: Progressing  Goal: Verbalize understanding of personal risk factors for fall in the hospital  1/31/2024 1035 by Julia Wapperer, RN  Outcome: Progressing  1/31/2024 1035 by Julia Estrella RN  Outcome: Progressing  Goal:  Verbalize understanding of risk factor reduction measures to prevent injury from fall in the home  1/31/2024 1035 by Julia Estrella RN  Outcome: Progressing  1/31/2024 1035 by Julia Estrella RN  Outcome: Progressing  Goal: Use assistive devices by end of the shift  1/31/2024 1035 by Julia Estrella RN  Outcome: Progressing  1/31/2024 1035 by Julia Estrella RN  Outcome: Progressing  Goal: Pace activities to prevent fatigue by end of the shift  1/31/2024 1035 by Julia Estrella RN  Outcome: Progressing  1/31/2024 1035 by Julia Estrella RN  Outcome: Progressing       The clinical goals for the shift include be free of resp distress this shift

## 2024-01-31 NOTE — PROGRESS NOTES
Introduced myself to the patient at the bedside and explained my role in the discharge process. She denied any needs at discharge. Will continue to monitor for needs.

## 2024-01-31 NOTE — NURSING NOTE
Ambulatory pulse ox performed at patient's request, ambulated around the unit three times and patient became increasingly short of breath throughout the walk with pulse ox saturation 90-97% throughout. Patient c/o back tightness/spasms and says her breathing feels tight and shortness of breath increased with speaking as well. Back in room and recovering from ambulation.

## 2024-02-01 LAB
ALBUMIN SERPL BCP-MCNC: 4.5 G/DL (ref 3.4–5)
ANION GAP SERPL CALC-SCNC: 17 MMOL/L (ref 10–20)
BUN SERPL-MCNC: 18 MG/DL (ref 6–23)
CALCIUM SERPL-MCNC: 9.5 MG/DL (ref 8.6–10.3)
CHLORIDE SERPL-SCNC: 100 MMOL/L (ref 98–107)
CO2 SERPL-SCNC: 22 MMOL/L (ref 21–32)
CREAT SERPL-MCNC: 0.64 MG/DL (ref 0.5–1.05)
EGFRCR SERPLBLD CKD-EPI 2021: >90 ML/MIN/1.73M*2
ERYTHROCYTE [DISTWIDTH] IN BLOOD BY AUTOMATED COUNT: 13.2 % (ref 11.5–14.5)
GLUCOSE SERPL-MCNC: 133 MG/DL (ref 74–99)
HCT VFR BLD AUTO: 42.6 % (ref 36–46)
HGB BLD-MCNC: 14.2 G/DL (ref 12–16)
MAGNESIUM SERPL-MCNC: 2.13 MG/DL (ref 1.6–2.4)
MCH RBC QN AUTO: 30.5 PG (ref 26–34)
MCHC RBC AUTO-ENTMCNC: 33.3 G/DL (ref 32–36)
MCV RBC AUTO: 92 FL (ref 80–100)
NRBC BLD-RTO: 0 /100 WBCS (ref 0–0)
PHOSPHATE SERPL-MCNC: 2.9 MG/DL (ref 2.5–4.9)
PLATELET # BLD AUTO: 333 X10*3/UL (ref 150–450)
POTASSIUM SERPL-SCNC: 3.8 MMOL/L (ref 3.5–5.3)
RBC # BLD AUTO: 4.65 X10*6/UL (ref 4–5.2)
SODIUM SERPL-SCNC: 135 MMOL/L (ref 136–145)
WBC # BLD AUTO: 18.1 X10*3/UL (ref 4.4–11.3)

## 2024-02-01 PROCEDURE — 36415 COLL VENOUS BLD VENIPUNCTURE: CPT

## 2024-02-01 PROCEDURE — 2500000002 HC RX 250 W HCPCS SELF ADMINISTERED DRUGS (ALT 637 FOR MEDICARE OP, ALT 636 FOR OP/ED): Performed by: STUDENT IN AN ORGANIZED HEALTH CARE EDUCATION/TRAINING PROGRAM

## 2024-02-01 PROCEDURE — 2500000001 HC RX 250 WO HCPCS SELF ADMINISTERED DRUGS (ALT 637 FOR MEDICARE OP)

## 2024-02-01 PROCEDURE — 2500000002 HC RX 250 W HCPCS SELF ADMINISTERED DRUGS (ALT 637 FOR MEDICARE OP, ALT 636 FOR OP/ED): Performed by: INTERNAL MEDICINE

## 2024-02-01 PROCEDURE — 85027 COMPLETE CBC AUTOMATED: CPT

## 2024-02-01 PROCEDURE — 2500000002 HC RX 250 W HCPCS SELF ADMINISTERED DRUGS (ALT 637 FOR MEDICARE OP, ALT 636 FOR OP/ED)

## 2024-02-01 PROCEDURE — 2500000004 HC RX 250 GENERAL PHARMACY W/ HCPCS (ALT 636 FOR OP/ED): Performed by: INTERNAL MEDICINE

## 2024-02-01 PROCEDURE — 2500000004 HC RX 250 GENERAL PHARMACY W/ HCPCS (ALT 636 FOR OP/ED)

## 2024-02-01 PROCEDURE — 80069 RENAL FUNCTION PANEL: CPT

## 2024-02-01 PROCEDURE — 99232 SBSQ HOSP IP/OBS MODERATE 35: CPT

## 2024-02-01 PROCEDURE — 83735 ASSAY OF MAGNESIUM: CPT

## 2024-02-01 PROCEDURE — 94640 AIRWAY INHALATION TREATMENT: CPT

## 2024-02-01 PROCEDURE — 1100000001 HC PRIVATE ROOM DAILY

## 2024-02-01 RX ORDER — IPRATROPIUM BROMIDE AND ALBUTEROL SULFATE 2.5; .5 MG/3ML; MG/3ML
3 SOLUTION RESPIRATORY (INHALATION)
Status: DISCONTINUED | OUTPATIENT
Start: 2024-02-01 | End: 2024-02-03 | Stop reason: HOSPADM

## 2024-02-01 RX ADMIN — IPRATROPIUM BROMIDE AND ALBUTEROL SULFATE 3 ML: 2.5; .5 SOLUTION RESPIRATORY (INHALATION) at 16:07

## 2024-02-01 RX ADMIN — METHYLPREDNISOLONE SODIUM SUCCINATE 40 MG: 40 INJECTION, POWDER, FOR SOLUTION INTRAMUSCULAR; INTRAVENOUS at 21:00

## 2024-02-01 RX ADMIN — MONTELUKAST 10 MG: 10 TABLET, FILM COATED ORAL at 20:54

## 2024-02-01 RX ADMIN — BUDESONIDE INHALATION 0.5 MG: 0.5 SUSPENSION RESPIRATORY (INHALATION) at 20:41

## 2024-02-01 RX ADMIN — NYSTATIN 500000 UNITS: 100000 SUSPENSION ORAL at 17:31

## 2024-02-01 RX ADMIN — PANTOPRAZOLE SODIUM 40 MG: 40 TABLET, DELAYED RELEASE ORAL at 06:00

## 2024-02-01 RX ADMIN — FORMOTEROL FUMARATE 20 MCG: 20 SOLUTION RESPIRATORY (INHALATION) at 20:41

## 2024-02-01 RX ADMIN — POLYETHYLENE GLYCOL 3350 17 G: 17 POWDER, FOR SOLUTION ORAL at 20:54

## 2024-02-01 RX ADMIN — METHYLPREDNISOLONE SODIUM SUCCINATE 40 MG: 40 INJECTION, POWDER, FOR SOLUTION INTRAMUSCULAR; INTRAVENOUS at 05:55

## 2024-02-01 RX ADMIN — FORMOTEROL FUMARATE 20 MCG: 20 SOLUTION RESPIRATORY (INHALATION) at 08:10

## 2024-02-01 RX ADMIN — METHYLPREDNISOLONE SODIUM SUCCINATE 40 MG: 40 INJECTION, POWDER, FOR SOLUTION INTRAMUSCULAR; INTRAVENOUS at 13:34

## 2024-02-01 RX ADMIN — NYSTATIN 500000 UNITS: 100000 SUSPENSION ORAL at 13:30

## 2024-02-01 RX ADMIN — MAGNESIUM OXIDE TAB 400 MG (241.3 MG ELEMENTAL MG) 400 MG: 400 (241.3 MG) TAB at 08:46

## 2024-02-01 RX ADMIN — BUDESONIDE INHALATION 0.5 MG: 0.5 SUSPENSION RESPIRATORY (INHALATION) at 08:10

## 2024-02-01 RX ADMIN — NYSTATIN 500000 UNITS: 100000 SUSPENSION ORAL at 07:00

## 2024-02-01 RX ADMIN — NYSTATIN 500000 UNITS: 100000 SUSPENSION ORAL at 20:54

## 2024-02-01 RX ADMIN — IPRATROPIUM BROMIDE AND ALBUTEROL SULFATE 3 ML: 2.5; .5 SOLUTION RESPIRATORY (INHALATION) at 20:41

## 2024-02-01 RX ADMIN — IPRATROPIUM BROMIDE AND ALBUTEROL SULFATE 3 ML: 2.5; .5 SOLUTION RESPIRATORY (INHALATION) at 12:15

## 2024-02-01 RX ADMIN — IPRATROPIUM BROMIDE AND ALBUTEROL SULFATE 3 ML: 2.5; .5 SOLUTION RESPIRATORY (INHALATION) at 08:10

## 2024-02-01 ASSESSMENT — COGNITIVE AND FUNCTIONAL STATUS - GENERAL
MOBILITY SCORE: 24
DAILY ACTIVITIY SCORE: 24
DAILY ACTIVITIY SCORE: 24
MOBILITY SCORE: 24

## 2024-02-01 ASSESSMENT — PAIN - FUNCTIONAL ASSESSMENT
PAIN_FUNCTIONAL_ASSESSMENT: 0-10
PAIN_FUNCTIONAL_ASSESSMENT: 0-10

## 2024-02-01 ASSESSMENT — PAIN SCALES - GENERAL
PAINLEVEL_OUTOF10: 0 - NO PAIN
PAINLEVEL_OUTOF10: 0 - NO PAIN

## 2024-02-01 NOTE — PROGRESS NOTES
Rema Silver is a 48 y.o. female on day 4 of admission presenting with Severe persistent asthma with exacerbation.    Subjective   Patient seen and examined at bedside this morning.  Remains stable and unchanged from the day prior, continued to complain from fatigue and fluctuating dyspnea which is not any different from prior.  No acute events overnight. Asymptomatic otherwise at the time of the evaluation.        Objective     Physical Exam  Constitutional:       General: She is not in acute distress.     Appearance: She is obese. She is not ill-appearing or toxic-appearing.   HENT:      Mouth/Throat:      Mouth: Mucous membranes are moist.      Pharynx: Oropharynx is clear.   Eyes:      Conjunctiva/sclera: Conjunctivae normal.   Cardiovascular:      Rate and Rhythm: Regular rhythm.     Heart sounds: Normal heart sounds.   Pulmonary:      Effort: Pulmonary effort is normal with episodes of mild tachypnea. No respiratory distress.      Breath sounds: Mild expiratory wheezing-improving from prior  Abdominal:      General: Abdomen is flat. Bowel sounds are normal.   Musculoskeletal:      Right lower leg: No edema.      Left lower leg: No edema.   Skin:     General: Skin is warm and dry.   Neurological:      General: No focal deficit present.      Mental Status: She is alert. Mental status is at baseline.   Psychiatric:         Mood and Affect: Mood normal.         Behavior: Behavior normal.         Thought Content: Thought content normal.         Judgment: Judgment normal.     Last Recorded Vitals  Blood pressure 148/70, pulse 107, temperature 35.8 °C (96.4 °F), temperature source Temporal, resp. rate 20, height 1.524 m (5'), weight 102 kg (224 lb), SpO2 98 %, peak flow 320 L/min.  Intake/Output last 3 Shifts:  I/O last 3 completed shifts:  In: 1040 (10.2 mL/kg) [P.O.:1040]  Out: - (0 mL/kg)   Weight: 101.6 kg     Relevant Results  Scheduled medications  budesonide, 0.5 mg, nebulization, BID  enoxaparin, 40  mg, subcutaneous, q12h EVELYN  formoterol, 20 mcg, nebulization, BID  ipratropium-albuteroL, 3 mL, nebulization, q4h  magnesium oxide, 400 mg, oral, Daily  methylPREDNISolone sodium succinate (PF), 40 mg, intravenous, q8h  montelukast, 10 mg, oral, Nightly  nystatin, 500,000 Units, oral, 4x daily  pantoprazole, 40 mg, oral, Daily before breakfast  polyethylene glycol, 17 g, oral, BID      Continuous medications     PRN medications  PRN medications: acetaminophen-caffeine, cetirizine, ipratropium-albuteroL  Results from last 7 days   Lab Units 02/01/24  0812 01/31/24  0802 01/30/24  0705   WBC AUTO x10*3/uL 18.1* 19.9* 21.2*   RBC AUTO x10*6/uL 4.65 4.76 4.63   HEMOGLOBIN g/dL 14.2 14.5 14.0       Results from last 7 days   Lab Units 02/01/24  0812 01/31/24  0802 01/30/24  0705 01/28/24  0708 01/27/24  1818   SODIUM mmol/L 135* 135* 136   < > 135*   POTASSIUM mmol/L 3.8 4.2 4.0   < > 5.0   CHLORIDE mmol/L 100 100 102   < > 103   CO2 mmol/L 22 23 23   < > 22   BUN mg/dL 18 16 15   < > 16   CREATININE mg/dL 0.64 0.65 0.62   < > 0.74   CALCIUM mg/dL 9.5 10.0 9.9   < > 9.8   PHOSPHORUS mg/dL 2.9 3.9 4.0   < >  --    MAGNESIUM mg/dL 2.13 2.26 2.18   < >  --    BILIRUBIN TOTAL mg/dL  --   --   --   --  0.5   ALT U/L  --   --   --   --  39   AST U/L  --   --   --   --  45*    < > = values in this interval not displayed.         CT angio chest for pulmonary embolism    Result Date: 1/27/2024  Interpreted By:  Ned Johnson, STUDY: CT ANGIO CHEST FOR PULMONARY EMBOLISM;  1/27/2024 8:58 pm   INDICATION: Signs/Symptoms:sob.   COMPARISON: None.   ACCESSION NUMBER(S): [id number]   ORDERING CLINICIAN: MELY SINGAL   TECHNIQUE: Contiguous axial images of the chest were obtained after the intravenous administration of  contrast. Coronal and sagittal reformatted images were obtained from the axial images. MIPS were also performed and reviewed and confirmed the findings of the examination.   FINDINGS: No axillary, mediastinal, or hilar  lymphadenopathy.   The heart is normal in size. There is common origin of the right brachiocephalic and left common carotid artery, an anatomic variant. No significant pericardial effusion. No evidence of acute central, main, lobar or proximal segmental pulmonary embolism.   Mild bibasilar atelectasis. No significant pleural effusion. No pneumothorax.   Limited evaluation of the upper abdomen. Hepatic steatosis.   Multilevel degenerative change of the thoracic spine.       No evidence of acute pulmonary embolism.   Mild bibasilar subsegmental atelectasis.   MACRO: None   Signed by: Ned Johnson 1/27/2024 9:31 PM Dictation workstation:   [id number]    CT sinus w IV contrast    Result Date: 1/27/2024  Interpreted By:  Ned Johnson, STUDY: CT SINUS W IV CONTRAST;  1/27/2024 8:58 pm   INDICATION: Signs/Symptoms:congestion.   COMPARISON: None   ACCESSION NUMBER(S): [id number]   ORDERING CLINICIAN: MELY RIOS   TECHNIQUE: Contiguous axial images of the paranasal sinuses were obtained after the intravenous administration of contrast. Coronal reformatted images were obtained from the axial images.   FINDINGS: The paranasal sinuses are clear and well pneumatized. No sinus air-fluid level. The mastoid air cells are also clear and well pneumatized. No evidence of acute displaced maxillofacial fracture. No evidence of pathologic enhancement.       No significant mucosal thickening of the paranasal sinuses.   MACRO: None   Signed by: Ned Johnson 1/27/2024 9:29 PM Dictation workstation:   [id number]    XR chest 1 view    Result Date: 1/27/2024  Interpreted By:  Salvador Bettencourt, STUDY: XR CHEST 1 VIEW  1/27/2024 6:47 pm   INDICATION: Signs/Symptoms:Chest Pain   COMPARISON: 08/13/2023   ACCESSION NUMBER(S): [id number]   ORDERING CLINICIAN: MELY RIOS   TECHNIQUE: A single AP portable radiograph of the chest was obtained.   FINDINGS: No focal infiltrate, pleural effusion or pneumothorax is identified. The cardiac  silhouette is within normal limits for size.       No focal infiltrate or pneumothorax is identified.   MACRO: None.   Signed by: Salvador Bettencourt 1/27/2024 7:15 PM Dictation workstation:   [id number]       Assessment/Plan   Principal Problem:    Severe persistent asthma with exacerbation  Rema Silver is a 48 y.o. female presenting with asthma exacerbation.  Past medical history significant for severe persistent asthma (tezepelumab-ekko monthly), vocal cord dysfunction, fibromyalgia, chronic fatigue syndrome, pulmonary micro embolism after knee arthoplasty.  Patient presents to the ED for admission of prolonged exacerbation exacerbation since November 1.  Followed by pulmonology, Dr. Khan, and was instructed to report to the hospital for steroids and breathing treatment for control of current asthma exacerbation.     Plan    #Asthma exacerbation, severe persistent (tezepelumab-ekko monthly)-gradually improving  #h/o vocal cord paralysis  #oral candidiasis with concurrent inhaler use  Followed By Dr. Khan (per notes possible bronchopulmonary thermoplasty candidate at some point in the future)  Chest x-ray and CT negative for acute process  -125 mg of Solu-Medrol given in ED, continue Solu-Medrol 40 mg IV 3 times daily (since 1/29/2024)  - DuoNebs q4h scheduled, and PRN  - Nystain swish and swallow  - Singular   - Pulm consult, Dr. Khan and the pulmonary team are on board.  -Peak respiratory flow meter 3 times daily with regarding of the values for comparison/disease progression/control     #Leukocytosis  Likely secondary to prolonged steroid use  - Daily CBC     #Fibromyalgia  #Chronic fatigue  - continue home meds     DVT: Ambulation (patient declined to take Lovenox)  CODE: FULL  Disposition: 3-4 days for exacerbation control with pulmonary on consult             Discussed with attending,  Ketan Perez M.D. PGY-2  Internal Medicine    This note has been transcribed using Dragon voice recognition system and there  is a possibility of unintentional typing misprints.  Any information found to be copied from previous providers is done in the best interest of the patient to provide accurate, quality, and continuity of care.

## 2024-02-01 NOTE — CARE PLAN
Problem: Pain  Goal: My pain/discomfort is manageable  Outcome: Progressing     Problem: Safety  Goal: Patient will be injury free during hospitalization  Outcome: Progressing  Goal: I will remain free of falls  Outcome: Progressing     Problem: Daily Care  Goal: Daily care needs are met  Outcome: Progressing     Problem: Psychosocial Needs  Goal: Demonstrates ability to cope with hospitalization/illness  Outcome: Progressing  Goal: Collaborate with me, my family, and caregiver to identify my specific goals  Outcome: Progressing     Problem: Discharge Barriers  Goal: My discharge needs are met  Outcome: Progressing     Problem: Fall/Injury  Goal: Not fall by end of shift  Outcome: Progressing  Goal: Be free from injury by end of the shift  Outcome: Progressing  Goal: Verbalize understanding of personal risk factors for fall in the hospital  Outcome: Progressing  Goal: Verbalize understanding of risk factor reduction measures to prevent injury from fall in the home  Outcome: Progressing  Goal: Use assistive devices by end of the shift  Outcome: Progressing  Goal: Pace activities to prevent fatigue by end of the shift  Outcome: Progressing   The patient's goals for the shift include Good night's sleep    The clinical goals for the shift include be free of resp distress this shift    Over the shift, the patient did make progress toward the following goals. \

## 2024-02-02 LAB
ALBUMIN SERPL BCP-MCNC: 4 G/DL (ref 3.4–5)
ANION GAP SERPL CALC-SCNC: 12 MMOL/L (ref 10–20)
BUN SERPL-MCNC: 19 MG/DL (ref 6–23)
CALCIUM SERPL-MCNC: 9.1 MG/DL (ref 8.6–10.3)
CHLORIDE SERPL-SCNC: 102 MMOL/L (ref 98–107)
CO2 SERPL-SCNC: 26 MMOL/L (ref 21–32)
CREAT SERPL-MCNC: 0.6 MG/DL (ref 0.5–1.05)
EGFRCR SERPLBLD CKD-EPI 2021: >90 ML/MIN/1.73M*2
ERYTHROCYTE [DISTWIDTH] IN BLOOD BY AUTOMATED COUNT: 13.2 % (ref 11.5–14.5)
GLUCOSE SERPL-MCNC: 110 MG/DL (ref 74–99)
HCT VFR BLD AUTO: 39.6 % (ref 36–46)
HGB BLD-MCNC: 12.9 G/DL (ref 12–16)
MAGNESIUM SERPL-MCNC: 2.16 MG/DL (ref 1.6–2.4)
MCH RBC QN AUTO: 30 PG (ref 26–34)
MCHC RBC AUTO-ENTMCNC: 32.6 G/DL (ref 32–36)
MCV RBC AUTO: 92 FL (ref 80–100)
NRBC BLD-RTO: 0 /100 WBCS (ref 0–0)
PHOSPHATE SERPL-MCNC: 3.6 MG/DL (ref 2.5–4.9)
PLATELET # BLD AUTO: 294 X10*3/UL (ref 150–450)
POTASSIUM SERPL-SCNC: 4.1 MMOL/L (ref 3.5–5.3)
RBC # BLD AUTO: 4.3 X10*6/UL (ref 4–5.2)
SODIUM SERPL-SCNC: 136 MMOL/L (ref 136–145)
WBC # BLD AUTO: 17.7 X10*3/UL (ref 4.4–11.3)

## 2024-02-02 PROCEDURE — 2500000004 HC RX 250 GENERAL PHARMACY W/ HCPCS (ALT 636 FOR OP/ED)

## 2024-02-02 PROCEDURE — 85027 COMPLETE CBC AUTOMATED: CPT

## 2024-02-02 PROCEDURE — 2500000002 HC RX 250 W HCPCS SELF ADMINISTERED DRUGS (ALT 637 FOR MEDICARE OP, ALT 636 FOR OP/ED)

## 2024-02-02 PROCEDURE — 2500000004 HC RX 250 GENERAL PHARMACY W/ HCPCS (ALT 636 FOR OP/ED): Performed by: INTERNAL MEDICINE

## 2024-02-02 PROCEDURE — 2500000002 HC RX 250 W HCPCS SELF ADMINISTERED DRUGS (ALT 637 FOR MEDICARE OP, ALT 636 FOR OP/ED): Performed by: INTERNAL MEDICINE

## 2024-02-02 PROCEDURE — 99233 SBSQ HOSP IP/OBS HIGH 50: CPT | Performed by: STUDENT IN AN ORGANIZED HEALTH CARE EDUCATION/TRAINING PROGRAM

## 2024-02-02 PROCEDURE — 80069 RENAL FUNCTION PANEL: CPT

## 2024-02-02 PROCEDURE — 83735 ASSAY OF MAGNESIUM: CPT

## 2024-02-02 PROCEDURE — 2500000001 HC RX 250 WO HCPCS SELF ADMINISTERED DRUGS (ALT 637 FOR MEDICARE OP)

## 2024-02-02 PROCEDURE — 94640 AIRWAY INHALATION TREATMENT: CPT

## 2024-02-02 PROCEDURE — 36415 COLL VENOUS BLD VENIPUNCTURE: CPT

## 2024-02-02 PROCEDURE — 1100000001 HC PRIVATE ROOM DAILY

## 2024-02-02 RX ADMIN — METHYLPREDNISOLONE SODIUM SUCCINATE 40 MG: 40 INJECTION, POWDER, FOR SOLUTION INTRAMUSCULAR; INTRAVENOUS at 13:58

## 2024-02-02 RX ADMIN — BUDESONIDE INHALATION 0.5 MG: 0.5 SUSPENSION RESPIRATORY (INHALATION) at 09:22

## 2024-02-02 RX ADMIN — POLYETHYLENE GLYCOL 3350 17 G: 17 POWDER, FOR SOLUTION ORAL at 21:29

## 2024-02-02 RX ADMIN — IPRATROPIUM BROMIDE AND ALBUTEROL SULFATE 3 ML: 2.5; .5 SOLUTION RESPIRATORY (INHALATION) at 13:34

## 2024-02-02 RX ADMIN — IPRATROPIUM BROMIDE AND ALBUTEROL SULFATE 3 ML: 2.5; .5 SOLUTION RESPIRATORY (INHALATION) at 04:05

## 2024-02-02 RX ADMIN — PANTOPRAZOLE SODIUM 40 MG: 40 TABLET, DELAYED RELEASE ORAL at 07:00

## 2024-02-02 RX ADMIN — FORMOTEROL FUMARATE 20 MCG: 20 SOLUTION RESPIRATORY (INHALATION) at 19:47

## 2024-02-02 RX ADMIN — METHYLPREDNISOLONE SODIUM SUCCINATE 40 MG: 40 INJECTION, POWDER, FOR SOLUTION INTRAMUSCULAR; INTRAVENOUS at 05:51

## 2024-02-02 RX ADMIN — FORMOTEROL FUMARATE 20 MCG: 20 SOLUTION RESPIRATORY (INHALATION) at 09:22

## 2024-02-02 RX ADMIN — IPRATROPIUM BROMIDE AND ALBUTEROL SULFATE 3 ML: 2.5; .5 SOLUTION RESPIRATORY (INHALATION) at 19:47

## 2024-02-02 RX ADMIN — MAGNESIUM OXIDE TAB 400 MG (241.3 MG ELEMENTAL MG) 400 MG: 400 (241.3 MG) TAB at 09:00

## 2024-02-02 RX ADMIN — POLYETHYLENE GLYCOL 3350 17 G: 17 POWDER, FOR SOLUTION ORAL at 09:00

## 2024-02-02 RX ADMIN — NYSTATIN 500000 UNITS: 100000 SUSPENSION ORAL at 21:28

## 2024-02-02 RX ADMIN — NYSTATIN 500000 UNITS: 100000 SUSPENSION ORAL at 13:58

## 2024-02-02 RX ADMIN — MONTELUKAST 10 MG: 10 TABLET, FILM COATED ORAL at 21:29

## 2024-02-02 RX ADMIN — IPRATROPIUM BROMIDE AND ALBUTEROL SULFATE 3 ML: 2.5; .5 SOLUTION RESPIRATORY (INHALATION) at 09:22

## 2024-02-02 RX ADMIN — NYSTATIN 500000 UNITS: 100000 SUSPENSION ORAL at 17:00

## 2024-02-02 RX ADMIN — NYSTATIN 500000 UNITS: 100000 SUSPENSION ORAL at 07:00

## 2024-02-02 RX ADMIN — BUDESONIDE INHALATION 0.5 MG: 0.5 SUSPENSION RESPIRATORY (INHALATION) at 19:47

## 2024-02-02 RX ADMIN — IPRATROPIUM BROMIDE AND ALBUTEROL SULFATE 3 ML: 2.5; .5 SOLUTION RESPIRATORY (INHALATION) at 16:07

## 2024-02-02 RX ADMIN — METHYLPREDNISOLONE SODIUM SUCCINATE 40 MG: 40 INJECTION, POWDER, FOR SOLUTION INTRAMUSCULAR; INTRAVENOUS at 21:29

## 2024-02-02 ASSESSMENT — COGNITIVE AND FUNCTIONAL STATUS - GENERAL
DAILY ACTIVITIY SCORE: 24
MOBILITY SCORE: 24

## 2024-02-02 ASSESSMENT — PAIN SCALES - GENERAL
PAINLEVEL_OUTOF10: 0 - NO PAIN
PAINLEVEL_OUTOF10: 0 - NO PAIN

## 2024-02-02 ASSESSMENT — PAIN - FUNCTIONAL ASSESSMENT
PAIN_FUNCTIONAL_ASSESSMENT: 0-10
PAIN_FUNCTIONAL_ASSESSMENT: 0-10

## 2024-02-02 NOTE — CARE PLAN
Problem: Pain  Goal: My pain/discomfort is manageable  Outcome: Progressing     Problem: Safety  Goal: Patient will be injury free during hospitalization  Outcome: Progressing  Goal: I will remain free of falls  Outcome: Progressing     Problem: Daily Care  Goal: Daily care needs are met  Outcome: Progressing     Problem: Psychosocial Needs  Goal: Demonstrates ability to cope with hospitalization/illness  Outcome: Progressing  Goal: Collaborate with me, my family, and caregiver to identify my specific goals  Outcome: Progressing     Problem: Discharge Barriers  Goal: My discharge needs are met  Outcome: Progressing     Problem: Fall/Injury  Goal: Not fall by end of shift  Outcome: Progressing  Goal: Be free from injury by end of the shift  Outcome: Progressing  Goal: Verbalize understanding of personal risk factors for fall in the hospital  Outcome: Progressing  Goal: Verbalize understanding of risk factor reduction measures to prevent injury from fall in the home  Outcome: Progressing  Goal: Use assistive devices by end of the shift  Outcome: Progressing  Goal: Pace activities to prevent fatigue by end of the shift  Outcome: Progressing   The patient's goals for the shift include Good night's sleep    The clinical goals for the shift include completed    Over the shift, the patient did not make progress toward the following goals.

## 2024-02-02 NOTE — CARE PLAN
Problem: Pain  Goal: My pain/discomfort is manageable  2/2/2024 1731 by Lisa Rose RN  Reactivated  2/2/2024 1731 by Lisa Rose RN  Outcome: Met     Problem: Safety  Goal: Patient will be injury free during hospitalization  2/2/2024 1731 by Lisa Rose RN  Reactivated  2/2/2024 1731 by Lisa Rose RN  Outcome: Met  Goal: I will remain free of falls  2/2/2024 1731 by Lisa Rose RN  Reactivated  2/2/2024 1731 by Lisa Rose RN  Outcome: Met     Problem: Daily Care  Goal: Daily care needs are met  2/2/2024 1731 by Lisa Rose RN  Reactivated  2/2/2024 1731 by Lisa Rose RN  Outcome: Met     Problem: Psychosocial Needs  Goal: Demonstrates ability to cope with hospitalization/illness  2/2/2024 1731 by Lisa Rose RN  Reactivated  2/2/2024 1731 by Lisa Rose RN  Outcome: Met  Goal: Collaborate with me, my family, and caregiver to identify my specific goals  2/2/2024 1731 by Lisa Rose RN  Reactivated  2/2/2024 1731 by Lisa Rose RN  Outcome: Met     Problem: Discharge Barriers  Goal: My discharge needs are met  2/2/2024 1731 by Lisa Rose RN  Reactivated  2/2/2024 1731 by Lisa Rose RN  Outcome: Met     Problem: Fall/Injury  Goal: Not fall by end of shift  2/2/2024 1731 by Lisa Rose RN  Reactivated  2/2/2024 1731 by Lisa Rose RN  Outcome: Met  Goal: Be free from injury by end of the shift  2/2/2024 1731 by Lisa Rose RN  Reactivated  2/2/2024 1731 by Lisa Rose RN  Outcome: Met  Goal: Verbalize understanding of personal risk factors for fall in the hospital  2/2/2024 1731 by Lisa Rose RN  Reactivated  2/2/2024 1731 by Lisa Rose RN  Outcome: Met  Goal: Verbalize understanding of risk factor reduction measures to prevent injury from fall in the home  2/2/2024 1731 by Lisa Rose RN  Reactivated  2/2/2024 1731 by Lisa Walker, RN  Outcome: Met  Goal: Use assistive devices by end of the shift  2/2/2024 1731 by Lisa Rose,  RN  Reactivated  2/2/2024 1731 by Lisa Rose RN  Outcome: Met  Goal: Pace activities to prevent fatigue by end of the shift  2/2/2024 1731 by Lisa Rose RN  Reactivated  2/2/2024 1731 by Lisa Rose RN  Outcome: Met   The patient's goals for the shift include Good night's sleep    The clinical goals for the shift include completed    .

## 2024-02-02 NOTE — PROGRESS NOTES
Rema Silver is a 48 y.o. female on day 5 of admission presenting with Severe persistent asthma with exacerbation.    Subjective   Patient seen and examined at bedside this morning.  Remains stable and unchanged from the day prior, continued to complain from fatigue and fluctuating dyspnea which is not any different from prior.  No acute events overnight. Asymptomatic otherwise at the time of the evaluation.  Awaiting pulmonary final recommendation and follow-up for this patient's management.       Objective     Physical Exam  Constitutional:       General: She is not in acute distress.     Appearance: She is obese. She is not ill-appearing or toxic-appearing.   HENT:      Mouth/Throat:      Mouth: Mucous membranes are moist.      Pharynx: Oropharynx is clear.   Eyes:      Conjunctiva/sclera: Conjunctivae normal.   Cardiovascular:      Rate and Rhythm: Regular rhythm.     Heart sounds: Normal heart sounds.   Pulmonary:      Effort: Pulmonary effort is normal with episodes of mild tachypnea. No respiratory distress.      Breath sounds: Mild expiratory wheezing-improving from prior  Abdominal:      General: Abdomen is flat. Bowel sounds are normal.   Musculoskeletal:      Right lower leg: No edema.      Left lower leg: No edema.   Skin:     General: Skin is warm and dry.   Neurological:      General: No focal deficit present.      Mental Status: She is alert. Mental status is at baseline.   Psychiatric:         Mood and Affect: Mood normal.         Behavior: Behavior normal.         Thought Content: Thought content normal.         Judgment: Judgment normal.     Last Recorded Vitals  Blood pressure 130/65, pulse 85, temperature 36 °C (96.8 °F), temperature source Temporal, resp. rate 18, height 1.524 m (5'), weight 102 kg (224 lb), SpO2 97 %, peak flow 345 L/min.  Intake/Output last 3 Shifts:  I/O last 3 completed shifts:  In: 720 (7.1 mL/kg) [P.O.:720]  Out: - (0 mL/kg)   Weight: 101.6 kg     Relevant  Results  Scheduled medications  budesonide, 0.5 mg, nebulization, BID  enoxaparin, 40 mg, subcutaneous, q12h EVELYN  formoterol, 20 mcg, nebulization, BID  ipratropium-albuteroL, 3 mL, nebulization, q4h  magnesium oxide, 400 mg, oral, Daily  methylPREDNISolone sodium succinate (PF), 40 mg, intravenous, q8h  montelukast, 10 mg, oral, Nightly  nystatin, 500,000 Units, oral, 4x daily  pantoprazole, 40 mg, oral, Daily before breakfast  polyethylene glycol, 17 g, oral, BID      Continuous medications     PRN medications  PRN medications: acetaminophen-caffeine, cetirizine, ipratropium-albuteroL  Results from last 7 days   Lab Units 02/01/24  0812 01/31/24  0802 01/30/24  0705   WBC AUTO x10*3/uL 18.1* 19.9* 21.2*   RBC AUTO x10*6/uL 4.65 4.76 4.63   HEMOGLOBIN g/dL 14.2 14.5 14.0       Results from last 7 days   Lab Units 02/01/24  0812 01/31/24  0802 01/30/24  0705 01/28/24  0708 01/27/24  1818   SODIUM mmol/L 135* 135* 136   < > 135*   POTASSIUM mmol/L 3.8 4.2 4.0   < > 5.0   CHLORIDE mmol/L 100 100 102   < > 103   CO2 mmol/L 22 23 23   < > 22   BUN mg/dL 18 16 15   < > 16   CREATININE mg/dL 0.64 0.65 0.62   < > 0.74   CALCIUM mg/dL 9.5 10.0 9.9   < > 9.8   PHOSPHORUS mg/dL 2.9 3.9 4.0   < >  --    MAGNESIUM mg/dL 2.13 2.26 2.18   < >  --    BILIRUBIN TOTAL mg/dL  --   --   --   --  0.5   ALT U/L  --   --   --   --  39   AST U/L  --   --   --   --  45*    < > = values in this interval not displayed.         CT angio chest for pulmonary embolism    Result Date: 1/27/2024  Interpreted By:  Ned Johnson, STUDY: CT ANGIO CHEST FOR PULMONARY EMBOLISM;  1/27/2024 8:58 pm   INDICATION: Signs/Symptoms:sob.   COMPARISON: None.   ACCESSION NUMBER(S): [id number]   ORDERING CLINICIAN: MELY RIOS   TECHNIQUE: Contiguous axial images of the chest were obtained after the intravenous administration of  contrast. Coronal and sagittal reformatted images were obtained from the axial images. MIPS were also performed and reviewed and  confirmed the findings of the examination.   FINDINGS: No axillary, mediastinal, or hilar lymphadenopathy.   The heart is normal in size. There is common origin of the right brachiocephalic and left common carotid artery, an anatomic variant. No significant pericardial effusion. No evidence of acute central, main, lobar or proximal segmental pulmonary embolism.   Mild bibasilar atelectasis. No significant pleural effusion. No pneumothorax.   Limited evaluation of the upper abdomen. Hepatic steatosis.   Multilevel degenerative change of the thoracic spine.       No evidence of acute pulmonary embolism.   Mild bibasilar subsegmental atelectasis.   MACRO: None   Signed by: Ned Johnson 1/27/2024 9:31 PM Dictation workstation:   [id number]    CT sinus w IV contrast    Result Date: 1/27/2024  Interpreted By:  Ned Johnson, STUDY: CT SINUS W IV CONTRAST;  1/27/2024 8:58 pm   INDICATION: Signs/Symptoms:congestion.   COMPARISON: None   ACCESSION NUMBER(S): [id number]   ORDERING CLINICIAN: MELY RIOS   TECHNIQUE: Contiguous axial images of the paranasal sinuses were obtained after the intravenous administration of contrast. Coronal reformatted images were obtained from the axial images.   FINDINGS: The paranasal sinuses are clear and well pneumatized. No sinus air-fluid level. The mastoid air cells are also clear and well pneumatized. No evidence of acute displaced maxillofacial fracture. No evidence of pathologic enhancement.       No significant mucosal thickening of the paranasal sinuses.   MACRO: None   Signed by: Ned Johnson 1/27/2024 9:29 PM Dictation workstation:   [id number]    XR chest 1 view    Result Date: 1/27/2024  Interpreted By:  Salvador Bettencourt, STUDY: XR CHEST 1 VIEW  1/27/2024 6:47 pm   INDICATION: Signs/Symptoms:Chest Pain   COMPARISON: 08/13/2023   ACCESSION NUMBER(S): [id number]   ORDERING CLINICIAN: MELY RIOS   TECHNIQUE: A single AP portable radiograph of the chest was obtained.    FINDINGS: No focal infiltrate, pleural effusion or pneumothorax is identified. The cardiac silhouette is within normal limits for size.       No focal infiltrate or pneumothorax is identified.   MACRO: None.   Signed by: Salvador Bettencourt 1/27/2024 7:15 PM Dictation workstation:   [id number]       Assessment/Plan   Principal Problem:    Severe persistent asthma with exacerbation  Rema Silver is a 48 y.o. female presenting with asthma exacerbation.  Past medical history significant for severe persistent asthma (tezepelumab-ekko monthly), vocal cord dysfunction, fibromyalgia, chronic fatigue syndrome, pulmonary micro embolism after knee arthoplasty.  Patient presents to the ED for admission of prolonged exacerbation exacerbation since November 1.  Followed by pulmonology, Dr. Khan, and was instructed to report to the hospital for steroids and breathing treatment for control of current asthma exacerbation.     Plan    #Asthma exacerbation, severe persistent (tezepelumab-ekko monthly)-gradually improving  #h/o vocal cord paralysis  #oral candidiasis with concurrent inhaler use  Followed By Dr. Khan (per notes possible bronchopulmonary thermoplasty candidate at some point in the future)  Chest x-ray and CT negative for acute process  -125 mg of Solu-Medrol given in ED, continue Solu-Medrol 40 mg IV 3 times daily (since 1/29/2024)  - DuoNebs q4h scheduled, and PRN  - Nystain swish and swallow  - Singular   - Pulm consult, Dr. Khan and the pulmonary team are on board.  -Peak respiratory flow meter 3 times daily with regarding of the values for comparison/disease progression/control     #Leukocytosis  Likely secondary to prolonged steroid use.  No signs or symptoms of infection.  - Daily CBC     #Fibromyalgia  #Chronic fatigue  - continue home meds     DVT: Ambulation (patient declined to take Lovenox)  CODE: FULL  Disposition: 3-4 days for exacerbation control with pulmonary on consult             Discussed with  attending,  Ketan Perez M.D. PGY-2  Internal Medicine    This note has been transcribed using Dragon voice recognition system and there is a possibility of unintentional typing misprints.  Any information found to be copied from previous providers is done in the best interest of the patient to provide accurate, quality, and continuity of care.

## 2024-02-03 ENCOUNTER — APPOINTMENT (OUTPATIENT)
Dept: RADIOLOGY | Facility: CLINIC | Age: 49
End: 2024-02-03
Payer: COMMERCIAL

## 2024-02-03 VITALS
BODY MASS INDEX: 43.98 KG/M2 | WEIGHT: 224 LBS | TEMPERATURE: 96.8 F | SYSTOLIC BLOOD PRESSURE: 152 MMHG | OXYGEN SATURATION: 98 % | RESPIRATION RATE: 20 BRPM | HEART RATE: 99 BPM | HEIGHT: 60 IN | DIASTOLIC BLOOD PRESSURE: 69 MMHG

## 2024-02-03 LAB
ATRIAL RATE: 88 BPM
ATRIAL RATE: 95 BPM
P AXIS: 51 DEGREES
P AXIS: 67 DEGREES
P OFFSET: 194 MS
P OFFSET: 199 MS
P ONSET: 144 MS
P ONSET: 149 MS
PR INTERVAL: 148 MS
PR INTERVAL: 156 MS
Q ONSET: 222 MS
Q ONSET: 223 MS
QRS COUNT: 15 BEATS
QRS COUNT: 16 BEATS
QRS DURATION: 64 MS
QRS DURATION: 70 MS
QT INTERVAL: 352 MS
QT INTERVAL: 356 MS
QTC CALCULATION(BAZETT): 430 MS
QTC CALCULATION(BAZETT): 442 MS
QTC FREDERICIA: 404 MS
QTC FREDERICIA: 410 MS
R AXIS: 62 DEGREES
R AXIS: 72 DEGREES
T AXIS: 35 DEGREES
T AXIS: 44 DEGREES
T OFFSET: 399 MS
T OFFSET: 400 MS
VENTRICULAR RATE: 88 BPM
VENTRICULAR RATE: 95 BPM

## 2024-02-03 PROCEDURE — 99239 HOSP IP/OBS DSCHRG MGMT >30: CPT | Performed by: STUDENT IN AN ORGANIZED HEALTH CARE EDUCATION/TRAINING PROGRAM

## 2024-02-03 PROCEDURE — 2500000002 HC RX 250 W HCPCS SELF ADMINISTERED DRUGS (ALT 637 FOR MEDICARE OP, ALT 636 FOR OP/ED)

## 2024-02-03 PROCEDURE — 2500000004 HC RX 250 GENERAL PHARMACY W/ HCPCS (ALT 636 FOR OP/ED)

## 2024-02-03 PROCEDURE — 2500000002 HC RX 250 W HCPCS SELF ADMINISTERED DRUGS (ALT 637 FOR MEDICARE OP, ALT 636 FOR OP/ED): Performed by: INTERNAL MEDICINE

## 2024-02-03 PROCEDURE — 2500000004 HC RX 250 GENERAL PHARMACY W/ HCPCS (ALT 636 FOR OP/ED): Performed by: INTERNAL MEDICINE

## 2024-02-03 PROCEDURE — 2500000001 HC RX 250 WO HCPCS SELF ADMINISTERED DRUGS (ALT 637 FOR MEDICARE OP)

## 2024-02-03 PROCEDURE — 94640 AIRWAY INHALATION TREATMENT: CPT

## 2024-02-03 RX ORDER — PREDNISONE 10 MG/1
TABLET ORAL
Qty: 150 TABLET | Refills: 0 | Status: SHIPPED | OUTPATIENT
Start: 2024-02-03 | End: 2024-03-24

## 2024-02-03 RX ORDER — PREDNISONE 10 MG/1
10 TABLET ORAL DAILY
OUTPATIENT
Start: 2024-02-03

## 2024-02-03 RX ORDER — PREDNISONE 10 MG/1
TABLET ORAL
Qty: 80 TABLET | Refills: 0 | Status: SHIPPED | OUTPATIENT
Start: 2024-02-03 | End: 2024-02-03 | Stop reason: HOSPADM

## 2024-02-03 RX ADMIN — NYSTATIN 500000 UNITS: 100000 SUSPENSION ORAL at 13:35

## 2024-02-03 RX ADMIN — POLYETHYLENE GLYCOL 3350 17 G: 17 POWDER, FOR SOLUTION ORAL at 09:19

## 2024-02-03 RX ADMIN — IPRATROPIUM BROMIDE AND ALBUTEROL SULFATE 3 ML: 2.5; .5 SOLUTION RESPIRATORY (INHALATION) at 11:55

## 2024-02-03 RX ADMIN — METHYLPREDNISOLONE SODIUM SUCCINATE 40 MG: 40 INJECTION, POWDER, FOR SOLUTION INTRAMUSCULAR; INTRAVENOUS at 13:35

## 2024-02-03 RX ADMIN — PANTOPRAZOLE SODIUM 40 MG: 40 TABLET, DELAYED RELEASE ORAL at 06:34

## 2024-02-03 RX ADMIN — BUDESONIDE INHALATION 0.5 MG: 0.5 SUSPENSION RESPIRATORY (INHALATION) at 08:04

## 2024-02-03 RX ADMIN — FORMOTEROL FUMARATE 20 MCG: 20 SOLUTION RESPIRATORY (INHALATION) at 08:04

## 2024-02-03 RX ADMIN — NYSTATIN 500000 UNITS: 100000 SUSPENSION ORAL at 06:34

## 2024-02-03 RX ADMIN — MAGNESIUM OXIDE TAB 400 MG (241.3 MG ELEMENTAL MG) 400 MG: 400 (241.3 MG) TAB at 09:19

## 2024-02-03 RX ADMIN — IPRATROPIUM BROMIDE AND ALBUTEROL SULFATE 3 ML: 2.5; .5 SOLUTION RESPIRATORY (INHALATION) at 08:04

## 2024-02-03 RX ADMIN — METHYLPREDNISOLONE SODIUM SUCCINATE 40 MG: 40 INJECTION, POWDER, FOR SOLUTION INTRAMUSCULAR; INTRAVENOUS at 06:34

## 2024-02-03 ASSESSMENT — PAIN SCALES - GENERAL: PAINLEVEL_OUTOF10: 0 - NO PAIN

## 2024-02-03 NOTE — HOSPITAL COURSE
Rema Silver is a 48 y.o. female ,  Past medical history significant for severe persistent asthma (tezepelumab-ekko monthly), vocal cord dysfunction, fibromyalgia, chronic fatigue, pulmonary micro embolism after knee arthoplasty (no longer on AC).  Patient presents to the ED for admission of prolonged exacerbation exacerbation since November 1 when she had sinusitis, presenting with asthma exacerbation.    Followed by pulmonology, Dr. Khan, and was instructed to report to the hospital for steroids and breathing treatment for control of current asthma exacerbation.  Chest x-ray and CT PE was negative for acute pulmonary processes.  He was treated with breathing treatment and IV steroids with improvement of her shortness of breath, it was not that she had leukocytosis which is likely secondary to steroid use as she did not show any signs or symptoms of infection, this of breath has improved and patient is discharged home on prolonged steroid taper.

## 2024-02-03 NOTE — DISCHARGE INSTRUCTIONS
Thank you for letting us participate in your care  You were admitted with asthma exacerbation that was treated with a breathing treatment and steroids, will be discharged on prednisone taper, starting 50 mg daily and reduce by 10 mgs every 10 days.  Take medications as directed  Follow-up with your PCP and Dr. Khan within 2 to 4 weeks      [facility]

## 2024-02-03 NOTE — DISCHARGE SUMMARY
Discharge Diagnosis  Severe persistent asthma with exacerbation    Issues Requiring Follow-Up  Steroid taper  Discharge Meds     Your medication list        CHANGE how you take these medications        Instructions Last Dose Given Next Dose Due   ipratropium-albuteroL 0.5-2.5 mg/3 mL nebulizer solution  Commonly known as: Duo-Neb  What changed:   how much to take  when to take this  Another medication with the same name was removed. Continue taking this medication, and follow the directions you see here.      Take 3 mL by nebulization see administration instructions. Every 4-6 hours prn       pantoprazole 40 mg EC tablet  Commonly known as: ProtoNix  What changed: See the new instructions.      TAKE 1 TABLET BY MOUTH TWICE DAILY 30  MINUTES  BEFORE  BREAKFAST  AND  DINNER       predniSONE 10 mg tablet  Commonly known as: Deltasone  Start taking on: February 3, 2024  What changed: See the new instructions.      Take 5 tablets (50 mg) by mouth once daily for 10 days, THEN 4 tablets (40 mg) once daily for 10 days, THEN 3 tablets (30 mg) once daily for 10 days, THEN 2 tablets (20 mg) once daily for 10 days, THEN 1 tablet (10 mg) once daily for 10 days.              CONTINUE taking these medications        Instructions Last Dose Given Next Dose Due   aspirin-acetaminophen-caffeine 250-250-65 mg tablet  Commonly known as: Excedrin Migraine           cyanocobalamin 1,000 mcg tablet  Commonly known as: Vitamin B-12           Dulera 200-5 mcg/actuation inhaler  Generic drug: mometasone-formoterol           fluticasone propion-salmeteroL 500-50 mcg/dose diskus inhaler  Commonly known as: Advair Diskus      Inhale 1 puff 2 times a day. Rinse mouth with water after use to reduce aftertaste and incidence of candidiasis. Do not swallow.       ibuprofen capsule  Commonly known as: Motrin           magnesium oxide 400 mg (241.3 mg magnesium) tablet  Commonly known as: Mag-Ox           montelukast 10 mg tablet  Commonly known as:  Singulair      Take 1 tablet (10 mg) by mouth once daily.       polyethylene glycol 17 gram/dose powder  Commonly known as: Glycolax, Miralax           POTASSIUM CHLORIDE ORAL           Spiriva Respimat 2.5 mcg/actuation inhaler  Generic drug: tiotropium      Inhale 2 puffs once daily.              STOP taking these medications      cetirizine 10 mg tablet  Commonly known as: ZyrTEC        nystatin 100,000 unit/mL suspension  Commonly known as: Mycostatin        Tezspire SubQ syringe  Generic drug: tezepelumab-ekko                  Where to Get Your Medications        These medications were sent to [facility] [address] [place] [id number] [facility]  [id number] [place] [id number]      Phone: [phone]   predniSONE 10 mg tablet         Test Results Pending At Discharge  Pending Labs       No current pending labs.            Hospital Course  Rema Silver is a 48 y.o. female ,  Past medical history significant for severe persistent asthma (tezepelumab-ekko monthly), vocal cord dysfunction, fibromyalgia, chronic fatigue, pulmonary micro embolism after knee arthoplasty (no longer on AC).  Patient presents to the ED for admission of prolonged exacerbation exacerbation since November 1 when she had sinusitis, presenting with asthma exacerbation.    Followed by pulmonology, Dr. Khan, and was instructed to report to the hospital for steroids and breathing treatment for control of current asthma exacerbation.  Chest x-ray and CT PE was negative for acute pulmonary processes.  He was treated with breathing treatment and IV steroids with improvement of her shortness of breath, it was not that she had leukocytosis which is likely secondary to steroid use as she did not show any signs or symptoms of infection, this of breath has improved and patient is discharged home on prolonged steroid taper.    Pertinent Physical Exam At Time of Discharge  Physical Exam  Constitutional:       Appearance:  Normal appearance.   HENT:      Head: Normocephalic and atraumatic.   Cardiovascular:      Rate and Rhythm: Normal rate and regular rhythm.   Pulmonary:      Effort: Pulmonary effort is normal.      Breath sounds: Normal breath sounds.   Abdominal:      General: Abdomen is flat.   Skin:     General: Skin is warm and dry.   Neurological:      General: No focal deficit present.      Mental Status: She is alert.   Psychiatric:         Mood and Affect: Mood normal.         Outpatient Follow-Up  Future Appointments   Date Time Provider Department Center   2/27/2024  8:00 AM Ebonie Prescott MD [id number]         Ashutosh العراقي MD

## 2024-02-05 ENCOUNTER — PATIENT OUTREACH (OUTPATIENT)
Dept: CARE COORDINATION | Facility: CLINIC | Age: 49
End: 2024-02-05
Payer: COMMERCIAL

## 2024-02-05 NOTE — PROGRESS NOTES
Discharge facility: [facility]  Discharge diagnosis: Severe persistent asthma with exacerbation   Admission date: 1/28/24  Discharge date: 2/3/24  Appointment Date: 2/27/24 w/ ENT    Outreach call to patient to support a smooth transition of care from recent admission.  Left voicemail message for patient with my contact information [phone].  Enrolled patient in [employer] chat[employer] for additional support and patient education through transition period.  Will continue to monitor through transition period.     Eva Diana RN     Patient Education     Neck Pain     Neck pain has several possible causes when there is no injury:  · You can get a minor ligament sprain or muscle strain from a sudden minor neck movement. Sleeping with your neck in an awkward position can also cause this.  · Some people respond to emotional stress by tensing the muscles of their neck, shoulders, and upper back. Chronic spasm in these muscles can cause neck pain and sometimes headaches.  · Gradual wear and tear of the joints in the spine can cause degenerative arthritis. This can be a source of occasional or chronic neck pain.  · The spinal disks may bulge and put pressure on a nearby spinal nerve. This can happen as a natural result of aging or repeated small injuries to the neck. The spinal disks are the cushions between each spinal bone. This causes tingling, pain, or numbness that spreads from the neck to the shoulder, arm, or hand on one side.  Acute neck pain usually gets better in 1 to 2 weeks. Neck pain related to disk disease, arthritis in the spinal joints, or spinal stenosis can become chronic and last for months or years. Spinal stenosis is narrowing of the spinal canal.  X-rays are usually not ordered for the initial evaluation of neck pain. But X-rays may be done if you had a forceful physical injury, such as a car accident or fall. If pain continues and doesn’t respond to medical treatment, X-rays and other tests may be done at a later time.  Home care  · Rest and relax the muscles. Use a comfortable pillow that supports the head. It should also help keep the spine in a neutral position. The position of the head should not be tilted forward or backward. A rolled up towel may help for a custom fit.  · A soft cervical collar can help pain, especially pain with head movement. Your doctor can tell you if this is appropriate for your condition.  · Some people find relief with heat. Heat can be applied with either a warm shower or bath or a moist towel  heated in the microwave and massage. Others prefer cold packs. You can make an ice pack by filling a plastic bag that seals at the top with ice cubes or crushed ice and then wrapping it with a thin towel. Try both and use the method that feels best for 15 to 20 minutes, several times a day.  · Whether using ice or heat, be careful that you don't injure your skin. Never put ice directly on the skin. Always wrap the ice in a towel or other type of cloth. This is very important, especially in people with poor skin sensations.   · Try to reduce your stress level. Emotional stress can lead to neck muscle tension and get in the way of or delay the healing process.  · You may use over-the-counter pain medicine to control pain, unless another medicine was prescribed. If you have chronic liver or kidney disease or ever had a stomach ulcer or digestive bleeding, talk with your healthcare provider before using these medicines.    Follow-up care  Follow up with your healthcare provider if your symptoms don't show signs of improvement after one week. Physical therapy or further tests may be needed.  If X-rays, CT scans, or MRI scans were taken, you will be told of any new findings that may affect your care.  Call 911  Call 911 if you have:  · Sudden weakness or numbness in one or both arms  · Neck swelling, difficulty or painful swallowing  · Trouble breathing  · Chest pain  When to seek medical advice  Call your healthcare provider right away if any of these occur:  · Pain becomes worse or spreads into one or both arm  · Increasing headache  · Fever of 100.4°F (38°C) or higher, or as directed by your healthcare provider  Mary last reviewed this educational content on 11/1/2019  © 5141-0437 [employer]. All rights reserved. This information is not intended as a substitute for professional medical care. Always follow your healthcare professional's instructions.

## 2024-02-07 ENCOUNTER — APPOINTMENT (OUTPATIENT)
Dept: PULMONOLOGY | Facility: CLINIC | Age: 49
End: 2024-02-07
Payer: COMMERCIAL

## 2024-02-13 DIAGNOSIS — K21.9 GASTROESOPHAGEAL REFLUX DISEASE WITHOUT ESOPHAGITIS: ICD-10-CM

## 2024-02-13 RX ORDER — PANTOPRAZOLE SODIUM 40 MG/1
40 TABLET, DELAYED RELEASE ORAL DAILY
Qty: 30 TABLET | Refills: 2 | Status: SHIPPED | OUTPATIENT
Start: 2024-02-13 | End: 2024-03-14 | Stop reason: SDUPTHER

## 2024-02-20 ENCOUNTER — TELEPHONE (OUTPATIENT)
Dept: PULMONOLOGY | Facility: CLINIC | Age: 49
End: 2024-02-20
Payer: COMMERCIAL

## 2024-02-21 DIAGNOSIS — B37.0 ORAL THRUSH: Primary | ICD-10-CM

## 2024-02-21 RX ORDER — NYSTATIN 100000 [USP'U]/ML
500000 SUSPENSION ORAL 4 TIMES DAILY
Qty: 280 ML | Refills: 0 | Status: SHIPPED | OUTPATIENT
Start: 2024-02-21 | End: 2024-03-06

## 2024-02-27 ENCOUNTER — OFFICE VISIT (OUTPATIENT)
Dept: OTOLARYNGOLOGY | Facility: CLINIC | Age: 49
End: 2024-02-27
Payer: COMMERCIAL

## 2024-02-27 VITALS — TEMPERATURE: 96.8 F | BODY MASS INDEX: 44.32 KG/M2 | HEIGHT: 60 IN | WEIGHT: 225.75 LBS

## 2024-02-27 DIAGNOSIS — J37.0 CHRONIC LARYNGITIS: ICD-10-CM

## 2024-02-27 DIAGNOSIS — J38.3 VOCAL CORD DYSFUNCTION: ICD-10-CM

## 2024-02-27 DIAGNOSIS — J45.50 SEVERE PERSISTENT ASTHMA, UNSPECIFIED WHETHER COMPLICATED (MULTI): ICD-10-CM

## 2024-02-27 DIAGNOSIS — R06.02 SHORTNESS OF BREATH: Primary | ICD-10-CM

## 2024-02-27 DIAGNOSIS — R49.0 MUSCLE TENSION DYSPHONIA: ICD-10-CM

## 2024-02-27 PROCEDURE — 31579 LARYNGOSCOPY TELESCOPIC: CPT | Performed by: OTOLARYNGOLOGY

## 2024-02-27 PROCEDURE — 99214 OFFICE O/P EST MOD 30 MIN: CPT | Performed by: OTOLARYNGOLOGY

## 2024-02-27 PROCEDURE — 99204 OFFICE O/P NEW MOD 45 MIN: CPT | Performed by: OTOLARYNGOLOGY

## 2024-02-27 PROCEDURE — 1036F TOBACCO NON-USER: CPT | Performed by: OTOLARYNGOLOGY

## 2024-02-27 RX ORDER — CHOLECALCIFEROL (VITAMIN D3) 125 MCG
CAPSULE ORAL
COMMUNITY
End: 2024-02-27 | Stop reason: ALTCHOICE

## 2024-02-27 RX ORDER — CETIRIZINE HYDROCHLORIDE 10 MG/1
TABLET ORAL
COMMUNITY
Start: 2024-02-14

## 2024-02-27 ASSESSMENT — PATIENT HEALTH QUESTIONNAIRE - PHQ9
1. LITTLE INTEREST OR PLEASURE IN DOING THINGS: NOT AT ALL
2. FEELING DOWN, DEPRESSED OR HOPELESS: NOT AT ALL
SUM OF ALL RESPONSES TO PHQ9 QUESTIONS 1 & 2: 0

## 2024-02-27 NOTE — PROGRESS NOTES
Patient: Rema Silver   MRN: [id number] YOB: 1975   Sex: female Age: 48 y.o.  Date of Service: 2024       ASSESSMENT AND PLAN  I discussed the findings with Rema Silver and have recommended the followin. Vocal cord dysfunction in setting of patient with severe persistent asthma. Her exam today reveals full abduction on quiet respiration and inspiratory splinting on cued inhalation after phonation, which is consistent with prior exams. Given that her shortness of breath has been ongoing and not episodic, I have low suspicion that the VCD is playing a significant role at this time. We discussed revisiting respiratory retraining therapy with SLP, but she would like to hold off for now. She is interested in pursuing bronchial thermoplasty with Dr. Correia.  - Follow-up with me in 6 months or as needed for repeat exam    2. Dysphonia, bilateral erythema/edema of vocal folds suggestive of chronic laryngitis that may be related to a combination of issues (cough, prolonged inhaled steroid use), and muscle tension. No obvious candidiasis today. She does have some mild thickened mucosa/dryness in the interarytenoid region that is nonspecific.  - Vocal hygiene discussed  - Oral rinse/gargle after inhaler use, fluconazole as needed  - Consider voice therapy when asthma under better control      CHIEF COMPLAINT  Chief Complaint   Patient presents with    vocal cord dysfunction     Asthma for 39 years  Flare up 10 years ago  3 long hospitals stays over past 3 years    Hoarseness    muscle dysphonia       HISTORY OF PRESENT ILLNESS  Rema Silver is a 48 y.o. female referred by Mary Anthony, * for evaluation of vocal fold dysfunction.  The patient has a history of severe persistent asthma. Her brother in law in Dr. Vivek Maddox.     24 - initial visit with me  She reports she has had a prolonged asthma flare-up for the past 4 months that has been persistent despite escalating  asthma treatment. She notes shortness of breath daily and was recently admitted at the end of January and discharged on a steroid taper but she hasn't felt much significant improvement. She notes she is considering bronchial thermoplasty with Dr. Garrett Correia. During exacerbations she notes that walking is difficult and feels constriction in her chest. She occasionally feels tightness in her throat. Hard to breathe both in and out, but this can vary. She was previously seen by Dr. Collazo 2018, noted to have inspiratory splinting of her vocal folds, and completed respiratory retraining which she reports helped minimally.     On protonix 40mg every day, she was told that she has a hiatal hernia in the past, egd/colonoscopy.    PMH: fibromyalgia, chronic fatigue syndrome  SH: never smoker      ADDITIONAL HISTORY  Past Medical History  She has a past medical history of Asthma and Personal history of other diseases of the respiratory system. Surgical History  She has a past surgical history that includes Esophagogastroduodenoscopy (2014); Other surgical history (2020); Laparoscopy diagnostic / biopsy / aspiration / lysis (2014); Breast surgery (2014);  section, classic (2014); and Umbilical hernia repair (2014).   Social History  She reports that she has never smoked. She has never used smokeless tobacco. She reports current alcohol use. She reports that she does not use drugs. Allergies  Opioids - morphine analogues     Family History  Family History   Problem Relation Name Age of Onset    HOLLY disease Maternal Grandfather      Newman's esophagus Maternal Grandfather      Asthma Other Aunt         REVIEW OF SYSTEMS  All 10 systems were reviewed and negative except for above.      PHYSICAL EXAM  ENT Physical Exam   GENERAL: Well-nourished and developed, alert and appropriate, no distress, voice [id number]  RESPIRATORY: Breathing quietly, no stridor  HEAD: Normocephalic  atraumatic  FACE: Symmetric, no masses or lesions  EYES:  Pupils reactive, sclera clear, external ocular muscles intact, no nystagmus.    EARS:  Pinnae normal. External auditory canals clear and tympanic membranes intact.  NOSE:  No anterior lesions, masses or polyps.  ORAL CAVITY/OROPHARYNX:  Buccal mucosa is moist without lesions or masses, tongue midline and palate elevates symmetrically. Tongue mobility intact.  NECK:  Soft. There is no lymphadenopathy or thyromegaly.    NEUROLOGIC:  Cranial nerves II-XII grossly intact.       Last Recorded Vitals  Temperature 36 °C (96.8 °F), height 1.524 m (5'), weight 102 kg (225 lb 12 oz).    RESULTS    Patient Reported Outcome Measures  N/A    Laboratory, Radiology, and Pathology  I personally reviewed the following results, with the following interpretation:   N/A      PROCEDURES  Flexible Stroboscopy In Clinic    Date/Time: 2/27/2024 9:11 AM    Performed by: Ebonie Prescott MD  Authorized by: Ebonie Prescott MD       Flexible Fiberoptic Laryngoscopy with Stroboscopy    Patient failed a mirror exam due to limitations of equipment and the need for stroboscopy to assess glottic vibration and closure.     PREOPERATIVE DIAGNOSIS: Dysphonia    POSTOPERATIVE DIAGNOSIS: Same    PROCEDURE:  Strobovideolaryngoscopy    ANESTHESIA:  Topical    COMPLICATIONS:  None    SPECIMENS:  None    PROCEDURE IN DETAIL: The patient was seated in an upright position.  The nasal cavity was topically decongested and anesthetized.  The stroboscopic microphone was held to the neck at the level of the larynx.  The distal chip video laryngoscope was passed through the nasal cavity.  The nasal cavity and nasopharynx were within normal limits except noted below.  The following findings on stroboscopy were noted:      Appearance of pharynx/larynx/Other Structural Lesions: interarytenoid pachydermia   Vocal Fold Mobility               Right VF: mobile, with mild splinting on inspiration               Left VF:  mobile, with mild splinting on inspiration   TVF Appearance               Edema/Erythema: mild/moderate               Lesions/vibratory margin irregularities: irregular medial surface without defined masses or lesions     Glottic Closure Pattern: slit posterior gap with hyperfunction    Vibration:               Phase: symmetric    Periodicity: regular                Amplitude: slight decrease                Waveform: mild increase   Muscle Tension Patterns:  moderate AP   Other abnormal findings: none    The patient tolerated the procedure well.     Max abduction during quite respiratory      Inspiratory splinting after phonation    ----------------------------------------------------------------------  Ebonie Prescott MD, MAEd    [facility]  Department of Otolaryngology - Head and Neck Surgery  [facility]    The total time I spent in care of this patient today (excluding time spent on other billable services) is as follows:    Time Spent  Prep time on day of patient encounter: 10 minutes  Time spent directly with patient, family or caregiver: 25 minutes  Additional Time Spent on Patient Care Activities: 5 minutes  Documentation Time: 5 minutes  Other Time Spent: 0 minutes  Total: 45 minutes

## 2024-02-27 NOTE — LETTER
2024     Mary Hanna MD  [id number]  Pulmonary Medicine, [address]  [facility] [address]    Patient: Rema Silver   YOB: 1975   Date of Visit: 2024       Dear Dr. Mary Hanna MD:    Thank you for referring Rema Silver to me for evaluation. Below are my notes for this consultation.  If you have questions, please do not hesitate to call me. I look forward to following your patient along with you.       Sincerely,     Ebonie Prescott MD      CC: No Recipients  ______________________________________________________________________________________    Patient: Rema Silver   MRN: [id number] YOB: 1975   Sex: female Age: 48 y.o.  Date of Service: 2024       ASSESSMENT AND PLAN  I discussed the findings with Rema Silver and have recommended the followin. Vocal cord dysfunction in setting of patient with severe persistent asthma. Her exam today reveals full abduction on quiet respiration and inspiratory splinting on cued inhalation after phonation, which is consistent with prior exams. Given that her shortness of breath has been ongoing and not episodic, I have low suspicion that the VCD is playing a significant role at this time. We discussed revisiting respiratory retraining therapy with SLP, but she would like to hold off for now. She is interested in pursuing bronchial thermoplasty with Dr. Correia.  - Follow-up with me in 6 months or as needed for repeat exam    2. Dysphonia, bilateral erythema/edema of vocal folds suggestive of chronic laryngitis that may be related to a combination of issues (cough, prolonged inhaled steroid use), and muscle tension. No obvious candidiasis today. She does have some mild thickened mucosa/dryness in the interarytenoid region that is nonspecific.  - Vocal hygiene discussed  - Oral rinse/gargle after inhaler use, fluconazole as needed  - Consider voice therapy when asthma under  better control      CHIEF COMPLAINT  Chief Complaint   Patient presents with   • vocal cord dysfunction     Asthma for 39 years  Flare up 10 years ago  3 long hospitals stays over past 3 years   • Hoarseness   • muscle dysphonia       HISTORY OF PRESENT ILLNESS  Rema Silver is a 48 y.o. female referred by Mary Anthony, * for evaluation of vocal fold dysfunction.  The patient has a history of severe persistent asthma. Her brother in law in Dr. Vviek Maddox.     24 - initial visit with me  She reports she has had a prolonged asthma flare-up for the past 4 months that has been persistent despite escalating asthma treatment. She notes shortness of breath daily and was recently admitted at the end of January and discharged on a steroid taper but she hasn't felt much significant improvement. She notes she is considering bronchial thermoplasty with Dr. Garrett Correia. During exacerbations she notes that walking is difficult and feels constriction in her chest. She occasionally feels tightness in her throat. Hard to breathe both in and out, but this can vary. She was previously seen by Dr. Collazo 2018, noted to have inspiratory splinting of her vocal folds, and completed respiratory retraining which she reports helped minimally.     On protonix 40mg every day, she was told that she has a hiatal hernia in the past, egd/colonoscopy.    PMH: fibromyalgia, chronic fatigue syndrome  SH: never smoker      ADDITIONAL HISTORY  Past Medical History  She has a past medical history of Asthma and Personal history of other diseases of the respiratory system. Surgical History  She has a past surgical history that includes Esophagogastroduodenoscopy (2014); Other surgical history (2020); Laparoscopy diagnostic / biopsy / aspiration / lysis (2014); Breast surgery (2014);  section, classic (2014); and Umbilical hernia repair (2014).   Social History  She reports that she has  never smoked. She has never used smokeless tobacco. She reports current alcohol use. She reports that she does not use drugs. Allergies  Opioids - morphine analogues     Family History  Family History   Problem Relation Name Age of Onset   • HOLLY disease Maternal Grandfather     • Newman's esophagus Maternal Grandfather     • Asthma Other Aunt         REVIEW OF SYSTEMS  All 10 systems were reviewed and negative except for above.      PHYSICAL EXAM  ENT Physical Exam   GENERAL: Well-nourished and developed, alert and appropriate, no distress, voice [id number]  RESPIRATORY: Breathing quietly, no stridor  HEAD: Normocephalic atraumatic  FACE: Symmetric, no masses or lesions  EYES:  Pupils reactive, sclera clear, external ocular muscles intact, no nystagmus.    EARS:  Pinnae normal. External auditory canals clear and tympanic membranes intact.  NOSE:  No anterior lesions, masses or polyps.  ORAL CAVITY/OROPHARYNX:  Buccal mucosa is moist without lesions or masses, tongue midline and palate elevates symmetrically. Tongue mobility intact.  NECK:  Soft. There is no lymphadenopathy or thyromegaly.    NEUROLOGIC:  Cranial nerves II-XII grossly intact.       Last Recorded Vitals  Temperature 36 °C (96.8 °F), height 1.524 m (5'), weight 102 kg (225 lb 12 oz).    RESULTS    Patient Reported Outcome Measures  N/A    Laboratory, Radiology, and Pathology  I personally reviewed the following results, with the following interpretation:   N/A      PROCEDURES  Flexible Stroboscopy In Clinic    Date/Time: 2/27/2024 9:11 AM    Performed by: Ebonie Prescott MD  Authorized by: Ebonie Prescott MD       Flexible Fiberoptic Laryngoscopy with Stroboscopy    Patient failed a mirror exam due to limitations of equipment and the need for stroboscopy to assess glottic vibration and closure.     PREOPERATIVE DIAGNOSIS: Dysphonia    POSTOPERATIVE DIAGNOSIS: Same    PROCEDURE:  Strobovideolaryngoscopy    ANESTHESIA:  Topical    COMPLICATIONS:   None    SPECIMENS:  None    PROCEDURE IN DETAIL: The patient was seated in an upright position.  The nasal cavity was topically decongested and anesthetized.  The stroboscopic microphone was held to the neck at the level of the larynx.  The distal chip video laryngoscope was passed through the nasal cavity.  The nasal cavity and nasopharynx were within normal limits except noted below.  The following findings on stroboscopy were noted:      Appearance of pharynx/larynx/Other Structural Lesions: interarytenoid pachydermia   Vocal Fold Mobility               Right VF: mobile, with mild splinting on inspiration               Left VF: mobile, with mild splinting on inspiration   TVF Appearance               Edema/Erythema: mild/moderate               Lesions/vibratory margin irregularities: irregular medial surface without defined masses or lesions     Glottic Closure Pattern: slit posterior gap with hyperfunction    Vibration:               Phase: symmetric    Periodicity: regular                Amplitude: slight decrease                Waveform: mild increase   Muscle Tension Patterns:  moderate AP   Other abnormal findings: none    The patient tolerated the procedure well.     Max abduction during quite respiratory      Inspiratory splinting after phonation    ----------------------------------------------------------------------  Ebonie Prescott MD, MAEd    [facility]  Department of Otolaryngology - Head and Neck Surgery  [facility]    The total time I spent in care of this patient today (excluding time spent on other billable services) is as follows:    Time Spent  Prep time on day of patient encounter: 10 minutes  Time spent directly with patient, family or caregiver: 25 minutes  Additional Time Spent on Patient Care Activities: 5 minutes  Documentation Time: 5 minutes  Other Time Spent: 0 minutes  Total: 45 minutes

## 2024-03-14 ENCOUNTER — OFFICE VISIT (OUTPATIENT)
Dept: PULMONOLOGY | Facility: CLINIC | Age: 49
End: 2024-03-14
Payer: COMMERCIAL

## 2024-03-14 VITALS
RESPIRATION RATE: 18 BRPM | TEMPERATURE: 97.7 F | BODY MASS INDEX: 44.14 KG/M2 | SYSTOLIC BLOOD PRESSURE: 125 MMHG | HEIGHT: 60 IN | HEART RATE: 112 BPM | OXYGEN SATURATION: 98 % | DIASTOLIC BLOOD PRESSURE: 81 MMHG | WEIGHT: 224.8 LBS

## 2024-03-14 DIAGNOSIS — J45.51 SEVERE PERSISTENT ASTHMA WITH ACUTE EXACERBATION (MULTI): Primary | ICD-10-CM

## 2024-03-14 DIAGNOSIS — K21.9 GASTROESOPHAGEAL REFLUX DISEASE WITHOUT ESOPHAGITIS: ICD-10-CM

## 2024-03-14 PROCEDURE — 99214 OFFICE O/P EST MOD 30 MIN: CPT | Performed by: INTERNAL MEDICINE

## 2024-03-14 PROCEDURE — 1036F TOBACCO NON-USER: CPT | Performed by: INTERNAL MEDICINE

## 2024-03-14 RX ORDER — PANTOPRAZOLE SODIUM 40 MG/1
40 TABLET, DELAYED RELEASE ORAL 2 TIMES DAILY
Qty: 60 TABLET | Refills: 2 | Status: SHIPPED | OUTPATIENT
Start: 2024-03-14 | End: 2024-05-21 | Stop reason: SDUPTHER

## 2024-03-24 NOTE — PROGRESS NOTES
Department of Medicine  Division of Pulmonary, Critical Care, and Sleep Medicine  Follow-Up Visit  [facility] - [address], [address]    Physician HPI:  Mrs Silver is a 48-year-old female with past medical history significant for severe persistent asthma who presented to the office today for follow up.  Rema reports severe persistent symptoms over the past few weeks despite recent course of prolonged corticosteroid taper.  She was tested positive for COVID about 3 weeks ago.  Denies high-grade fevers or hypoxemia.  She has frequent cough and severe chest congestion with audible wheezing at times.  Symptoms are not quite improving with nebulized albuterol treatment.  She has been using albuterol more than 4-5 times per day.  She is currently on prednisone 10 mg daily.  She continues to use ICS/LABA/LAMA inhaler and montelukast.    Rema has history of severe persistent asthma for which she was started on Tezspire injections about 1 year ago.  This is her first severe asthma exacerbation since she was started on Tezspire.  She was noted to have mild vocal cord dysfunction on ENT exam several years ago.  Has not seen ENT in the recent past.    ROS: No fevers or chills.  No acute chest pain.  Has nasal and sinus congestion.  Cough with inhalation.  No acute GI symptoms.  No acute skin rash or inflammatory arthritis now.  No orthopnea or lower extremity edema.    Follow up 1/23/2024:  No significant change in respiratory status since last visit last week. Feels tired and fatigued from persistent asthma symptoms and being on high dose of corticosteroids for several weeks now. No recent hx of high grade fevers. Oxygenation is stable on RA.    Follow up 03/14/2024:  Rema presented today for follow-up after recent hospital admission for acute  asthma exacerbation.  She is feeling better though she is not back to her baseline yet.  She is on a taper course of prednisone.  She was seen by ENT recently and exam did  not reveal significant VCD.    Immunization History   Administered Date(s) Administered    Flu vaccine (IIV4), preservative free *Check age/dose* 09/21/2018, 12/05/2022    Influenza, Unspecified 12/04/2009    Influenza, injectable, quadrivalent 10/14/2019, 11/06/2020, 10/25/2021    Influenza, seasonal, injectable 12/04/2009, 10/21/2020, 11/03/2021, 12/05/2022    Moderna SARS-CoV-2 Vaccination 04/27/2021, 05/27/2021, 12/15/2021    Pneumococcal polysaccharide vaccine, 23-valent, age 2 years and older (PNEUMOVAX 23) 10/14/2019    Tdap vaccine, age 7 year and older (BOOSTRIX, ADACEL) 09/21/2018, 09/26/2019       Current Outpatient Medications   Medication Instructions    aspirin-acetaminophen-caffeine (Excedrin Migraine) 250-250-65 mg tablet 1 tablet, oral, Every 6 hours PRN    cetirizine (ZyrTEC) 10 mg tablet TAKE 1 TABLET BY MOUTH ONCE DAILY AS NEEDED FOR ALLERGIES    cyanocobalamin (Vitamin B-12) 1,000 mcg tablet 1 tablet, oral, Daily    fluticasone propion-salmeteroL (Advair Diskus) 500-50 mcg/dose diskus inhaler 1 puff, inhalation, 2 times daily RT, Rinse mouth with water after use to reduce aftertaste and incidence of candidiasis. Do not swallow.    ibuprofen (Motrin) capsule 1-2 capsules, oral, Every 6 hours PRN    ipratropium-albuteroL (Duo-Neb) 0.5-2.5 mg/3 mL nebulizer solution 3 mL, nebulization, See admin instructions, Every 4-6 hours prn     magnesium oxide (Mag-Ox) 400 mg (241.3 mg magnesium) tablet 1 tablet, oral, Daily    mometasone-formoterol (Dulera) 200-5 mcg/actuation inhaler 2 puffs, inhalation, 2 times daily RT, Rinse mouth with water after use to reduce aftertaste and incidence of candidiasis. Do not swallow.    montelukast (SINGULAIR) 10 mg, oral, Daily    pantoprazole (PROTONIX) 40 mg, oral, 2 times daily    polyethylene glycol (GLYCOLAX, MIRALAX) 17 g, oral, Daily, MIX 1 CAPFUL (17GM) IN 8 OUNCES OF WATER, JUICE, OR TEA AND DRINK    POTASSIUM CHLORIDE ORAL 99 mg, oral, Daily    predniSONE  (Deltasone) 10 mg tablet Take 5 tablets (50 mg) by mouth once daily for 10 days, THEN 4 tablets (40 mg) once daily for 10 days, THEN 3 tablets (30 mg) once daily for 10 days, THEN 2 tablets (20 mg) once daily for 10 days, THEN 1 tablet (10 mg) once daily for 10 days.    Spiriva Respimat 2.5 mcg/actuation inhaler 2 puffs, inhalation, Daily        Allergies:  Allergies   Allergen Reactions    Opioids - Morphine Analogues Other          Visit Vitals  /81 (BP Location: Left arm, Patient Position: Sitting, BP Cuff Size: Large adult)   Pulse (!) 112   Temp 36.5 °C (97.7 °F) (Temporal)   Resp 18   Ht 1.524 m (5')   Wt 102 kg (224 lb 12.8 oz)   SpO2 98%   BMI 43.90 kg/m²   OB Status Having periods   Smoking Status Never   BSA 2.08 m²        Physical Exam  Vitals and nursing note reviewed.   Constitutional:       General: She is not in acute distress.     Appearance: Normal appearance.   HENT:      Head: Normocephalic and atraumatic.   Eyes:      Conjunctiva/sclera: Conjunctivae normal.   Cardiovascular:      Rate and Rhythm: Normal rate and regular rhythm.   Pulmonary:      Effort: Pulmonary effort is normal. No respiratory distress.      Breath sounds: No stridor. Wheezing present. No rhonchi.   Musculoskeletal:         General: Normal range of motion.      Cervical back: Normal range of motion and neck supple.   Skin:     General: Skin is warm and dry.      Coloration: Skin is not jaundiced.   Neurological:      General: No focal deficit present.      Mental Status: She is alert and oriented to person, place, and time. Mental status is at baseline.   Psychiatric:         Mood and Affect: Mood normal.         Behavior: Behavior normal.         Judgment: Judgment normal.            Chest Radiograph     XR chest 1 view 08/13/2023    Narrative  Interpreted By:  KIRSTIN BIRMINGHAM MD  MRN: [id number]  Patient Name: SUJIT PEREA    STUDY:  CHEST 1 VIEW;  8/13/2023 3:58 pm    INDICATION:  Chest Pain  .    COMPARISON:  March 25, 2023 chest CT and chest radiograph    ACCESSION NUMBER(S):  [id number]    ORDERING CLINICIAN:  HUGH FRANCISCO    FINDINGS:  AP radiograph of the chest was provided.        CARDIOMEDIASTINAL SILHOUETTE:  Cardiomediastinal silhouette is normal in size and configuration.    LUNGS:  Lungs are clear.    ABDOMEN:  No remarkable upper abdominal findings.    BONES:  No acute osseous changes.    Impression  1.  No evidence of acute cardiopulmonary process.        MACRO:  None      XR chest 1 view     Narrative  Interpreted By:  NO MARTINEZ MD  MRN: [id number]  Patient Name: SUJIT PEREA    STUDY:  CHEST 1 VIEW;  3/25/2023 11:08 am    INDICATION:  Chest Pain .    COMPARISON:  12/18/2022    ACCESSION NUMBER(S):  [id number]    ORDERING CLINICIAN:  MARY GROSSMAN    FINDINGS:  The heart is not enlarged. No infiltrate, pleural effusion or  pneumothorax is seen.    Impression  No active cardiopulmonary disease.      Echocardiogram     No results found for this or any previous visit from the past 365 days.       Chest CT Scan     CT angio chest for pulmonary embolism 08/13/2023    Narrative  Interpreted By:  EDEN CATALAN MD  MRN: [id number]  Patient Name: SUJIT PEREA    STUDY:  CT ANGIO CHEST FOR PE;  8/13/2023 5:36 pm    INDICATION:  chest pain, SOB, lower extremity swelling .    COMPARISON:  None.    ACCESSION NUMBER(S):  [id number]    ORDERING CLINICIAN:  JAMEY PIERCE    TECHNIQUE:  Contiguous axial images of the chest were obtained after the  intravenous administration of  60 mL Omnipaque 350. Coronal and  sagittal reformatted images were obtained from the axial images. MIPS  of the chest were also performed and reviewed and from the findings  of the examination.    FINDINGS:  No axillary, mediastinal, or hilar lymphadenopathy.    The heart is normal in size. No significant pericardial effusion. No  evidence of acute central, main, lobar or proximal segmental  pulmonary embolism.    Mild  bilateral subsegmental atelectasis. No significant pleural  effusion. No pneumothorax.    Limited evaluation of the upper abdomen.  Hepatic steatosis.    Multilevel degenerative change of the thoracic spine.    Impression  No evidence of acute pulmonary embolism.    No evidence of pneumonia.      CT angio chest for pulmonary embolism     Narrative  Interpreted By:  KIRSTIN BIRMINGHAM MD  MRN: [id number]  Patient Name: SUJIT PEREA    STUDY:  CT ANGIO CHEST FOR PE;  3/25/2023 1:29 pm    INDICATION:  chest pain .    COMPARISON:    Nancy 3, 2018 chest CT, January 10, 2021, March 15, 2021 and August 24, 2022 CT pulmonary angiograms.    ACCESSION NUMBER(S):  [id number]    ORDERING CLINICIAN:  MARY GROSSMAN    TECHNIQUE:  Helical data acquisition of the chest was obtained after intravenous  administration of 60 milliliterOMNIPAQUE 350, as per PE protocol.  Images were reformatted in coronal and sagittal planes. Axial and  coronal maximum intensity projection (MIP) images were created and  reviewed.    FINDINGS:  POTENTIAL LIMITATIONS OF THE STUDY: Likely technically adequate  although image degradation related to motion, and streak artifact  contributing to image heterogeneity    HEART AND VESSELS:  Pulmonary arterial heterogeneity including bandlike hypoenhancement  left lower lobe branches centrally series 402, image 109 most  compatible with. No evident definite pulmonary embolism identified.  Main pulmonary artery and its branches are normal in caliber.    The thoracic aorta normal in course and caliber.Although, the study  is not tailored for evaluation of aorta, there is no definite  evidence of acute aortic pathology.  No coronary artery calcifications are seen. Please note, the study is  not optimized for evaluation of coronary arteries.    The cardiac chambers are not enlarged.There are no findings to  suggest right heart strain.    There is no pericardial effusion seen.    MEDIASTINUM AND JOAN, LOWER NECK AND  AXILLA:  The visualized thyroid gland is within normal limits.  No evidence of thoracic lymphadenopathy by CT criteria.  Esophagus appears within normal limits as seen.    LUNGS AND AIRWAYS:  The trachea and central airways are patent. No endobronchial lesion  is seen.    Minor increased lung heterogeneity with curvilinear ground-glass  changes at the extreme bases compatible with atelectasis. No evident  consolidative pneumonia, edema, or effusion.      UPPER ABDOMEN:  The visualized subdiaphragmatic structures demonstrate no remarkable  findings.        CHEST WALL AND OSSEOUS STRUCTURES:  Chest wall is within normal limits.  No acute osseous pathology.There are no suspicious osseous  lesions.Scattered degenerative changes involving the spine appears  similar.    Impression  1. No evidence of acute pulmonary embolism.  2. Minor increased subsegmental atelectasis.       Laboratory Studies     Lab Results   Component Value Date    WBC 17.7 (H) 02/02/2024    HGB 12.9 02/02/2024    HCT 39.6 02/02/2024    MCV 92 02/02/2024     02/02/2024      Lab Results   Component Value Date    GLUCOSE 110 (H) 02/02/2024    CALCIUM 9.1 02/02/2024     02/02/2024    K 4.1 02/02/2024    CO2 26 02/02/2024     02/02/2024    BUN 19 02/02/2024    CREATININE 0.60 02/02/2024      Lab Results   Component Value Date    ALT 39 01/27/2024    AST 45 (H) 01/27/2024    ALKPHOS 43 01/27/2024    BILITOT 0.5 01/27/2024        Protime   Date/Time Value Ref Range Status   08/24/2022 12:17 PM 11.9 9.8 - 13.4 sec Final     INR   Date/Time Value Ref Range Status   08/24/2022 12:17 PM 1.0 0.9 - 1.1 Final       Immunocap IgE   Date/Time Value Ref Range Status   03/19/2022 12:51 PM 18.3 0.0 - 214.0 KU/L Final     Comment:      Note:  Omalizumab (Xolair, [employer]; humanized    IgG1 antihuman IgE Fc) treatment does not    significantly interfere with the accuracy of    total IgE on the ImmunoCAP ([employer]) platform.    J Allergy Clin Immunol  2006;117:759-66).   Allergens, parasitic diseases, smoking, and   alcohol consumption have been reported to   increase levels of total IgE in serum.       Aspergillus Fumigatus IgE   Date/Time Value Ref Range Status   03/19/2022 12:51 PM <0.10 <0.35 KU/L Final     Comment:       SEE IMMUNOCAP INTERP.IGE      Total IgG   Date/Time Value Ref Range Status   01/03/2023 10:32  700 - 1,600 mg/dL Final     Comment:     MONOCLONAL PROTEINS MAY CAUSE FALSELY LOW  RESULTS IN THIS ASSAY. SERUM PROTEIN  ELECTROPHORESIS SHOULD BE DONE AS THE  FIRST TEST TO EVALUATE MONOCLONAL GAMMOPATHY.       IgA   Date/Time Value Ref Range Status   01/03/2023 10:32  70 - 400 mg/dL Final     Comment:     MONOCLONAL PROTEINS MAY CAUSE FALSELY LOW  RESULTS IN THIS ASSAY. SERUM PROTEIN  ELECTROPHORESIS SHOULD BE DONE AS THE  FIRST TEST TO EVALUATE MONOCLONAL GAMMOPATHY.       IgM   Date/Time Value Ref Range Status   01/03/2023 10:32 AM 67 40 - 230 mg/dL Final     Comment:     MONOCLONAL PROTEINS MAY CAUSE FALSELY LOW  RESULTS IN THIS ASSAY. SERUM PROTEIN  ELECTROPHORESIS SHOULD BE DONE AS THE  FIRST TEST TO EVALUATE MONOCLONAL GAMMOPATHY.             Assessment and Plan / Recommendations     Severe persistent asthma  GERD  History of vocal cord dysfunction    CT chest 1/27/2024: no evidence of parenchymal disease or bronchiectasis.  ENT exam 2/27 is reviewed today.     Plan:  Taper prednisone off as planned on hospital discharge.   Continue on ICS/LABA/LAMA + Montelukast.  Continue on PPI for GERD  Can use albuterol nebs every 4 hours as needed  Will consult Interventional pulmonary for potential benefit from bronchial thermoplasty in the near future  given her severe persistent symptoms despite maximized inhaler regimen and anti [employer].    Mary Hanna MD

## 2024-04-02 DIAGNOSIS — J45.50 SEVERE PERSISTENT ASTHMA WITHOUT COMPLICATION (MULTI): Primary | ICD-10-CM

## 2024-04-02 RX ORDER — TEZEPELUMAB-EKKO 210 MG/1.9ML
210 INJECTION, SOLUTION SUBCUTANEOUS
Qty: 1.9 ML | Refills: 11 | Status: SHIPPED | OUTPATIENT
Start: 2024-04-02 | End: 2025-03-04

## 2024-04-15 ENCOUNTER — TELEPHONE (OUTPATIENT)
Dept: PULMONOLOGY | Facility: CLINIC | Age: 49
End: 2024-04-15
Payer: COMMERCIAL

## 2024-04-15 NOTE — TELEPHONE ENCOUNTER
The referral to Dr. Correia is not available for bronchial theramaloplasty was recommended to Dr. Talamantes 5/6/24 Wants to confrim with you. She is not sure if she does this procedure or not and does not want to waste a visit.

## 2024-05-21 ENCOUNTER — OFFICE VISIT (OUTPATIENT)
Dept: PULMONOLOGY | Facility: CLINIC | Age: 49
End: 2024-05-21
Payer: COMMERCIAL

## 2024-05-21 VITALS
SYSTOLIC BLOOD PRESSURE: 119 MMHG | DIASTOLIC BLOOD PRESSURE: 85 MMHG | WEIGHT: 222 LBS | TEMPERATURE: 97.5 F | HEIGHT: 60 IN | HEART RATE: 100 BPM | RESPIRATION RATE: 18 BRPM | OXYGEN SATURATION: 98 % | BODY MASS INDEX: 43.59 KG/M2

## 2024-05-21 DIAGNOSIS — K21.9 GASTROESOPHAGEAL REFLUX DISEASE WITHOUT ESOPHAGITIS: ICD-10-CM

## 2024-05-21 DIAGNOSIS — J45.50 SEVERE PERSISTENT ASTHMA WITHOUT COMPLICATION (MULTI): Primary | ICD-10-CM

## 2024-05-21 PROCEDURE — 99214 OFFICE O/P EST MOD 30 MIN: CPT | Performed by: INTERNAL MEDICINE

## 2024-05-21 PROCEDURE — 3008F BODY MASS INDEX DOCD: CPT | Performed by: INTERNAL MEDICINE

## 2024-05-21 RX ORDER — PREDNISONE 10 MG/1
TABLET ORAL DAILY
Qty: 40 EACH | Refills: 0 | Status: SHIPPED | OUTPATIENT
Start: 2024-05-21 | End: 2024-06-05

## 2024-05-21 RX ORDER — PANTOPRAZOLE SODIUM 40 MG/1
40 TABLET, DELAYED RELEASE ORAL 2 TIMES DAILY
Qty: 60 TABLET | Refills: 5 | Status: SHIPPED | OUTPATIENT
Start: 2024-05-21 | End: 2025-05-21

## 2024-05-21 ASSESSMENT — ASTHMA QUESTIONNAIRES
QUESTION_2 LAST FOUR WEEKS HOW OFTEN HAVE YOU HAD SHORTNESS OF BREATH: MORE THAN ONCE A DAY
QUESTION_5 LAST FOUR WEEKS HOW WOULD YOU RATE YOUR ASTHMA CONTROL: POORLY CONTROLLED
QUESTION_3 LAST FOUR WEEKS HOW OFTEN DID YOUR ASTHMA SYMPTOMS (WHEEZING, COUGHING, SHORTNESS OF BREATH, CHEST TIGHTNESS OR PAIN) WAKE YOU UP AT NIGHT OR EARLIER THAN USUAL IN THE MORNING: 2-3 NIGHTS A WEEK
QUESTION_1 LAST FOUR WEEKS HOW MUCH OF THE TIME DID YOUR ASTHMA KEEP YOU FROM GETTING AS MUCH DONE AT WORK, SCHOOL OR AT HOME: ALL OF THE TIME
QUESTION_4 LAST FOUR WEEKS HOW OFTEN HAVE YOU USED YOUR RESCUE INHALER OR NEBULIZER MEDICATION (SUCH AS ALBUTEROL): 3 OR MORE TIMES PER DAY
ACT_TOTALSCORE: 7

## 2024-07-08 ENCOUNTER — TELEPHONE (OUTPATIENT)
Dept: PULMONOLOGY | Facility: CLINIC | Age: 49
End: 2024-07-08
Payer: COMMERCIAL

## 2024-07-08 DIAGNOSIS — B37.0 THRUSH: Primary | ICD-10-CM

## 2024-07-08 RX ORDER — NYSTATIN 100000 [USP'U]/ML
500000 SUSPENSION ORAL 4 TIMES DAILY
Qty: 600 ML | Refills: 0 | Status: SHIPPED | OUTPATIENT
Start: 2024-07-08 | End: 2024-08-07

## 2024-07-18 ENCOUNTER — TELEPHONE (OUTPATIENT)
Dept: OTOLARYNGOLOGY | Facility: HOSPITAL | Age: 49
End: 2024-07-18
Payer: COMMERCIAL

## 2024-07-18 NOTE — TELEPHONE ENCOUNTER
Pt called wanting to know what her appt on 7/30/24 with Dr. Prescott was going to be. I explained to pt that this is an MSO/Bronchoscopy appt where Dr. Prescott will use a scope to look at her airway. Pt states she may need to be put under sedation for this and I informed pt that Dr. Prescott would not be able to give her sedation at the [facility], but during this appt, Dr. Prescott may be able to offer her the endosuite in [place] to have this done under sedation. Pt verbalized understanding. Dr. Prescott aware.

## 2024-07-23 ENCOUNTER — APPOINTMENT (OUTPATIENT)
Dept: CARDIOLOGY | Facility: HOSPITAL | Age: 49
End: 2024-07-23
Payer: COMMERCIAL

## 2024-07-23 ENCOUNTER — HOSPITAL ENCOUNTER (OUTPATIENT)
Facility: HOSPITAL | Age: 49
Setting detail: OBSERVATION
Discharge: HOME | End: 2024-07-24
Attending: EMERGENCY MEDICINE | Admitting: STUDENT IN AN ORGANIZED HEALTH CARE EDUCATION/TRAINING PROGRAM
Payer: COMMERCIAL

## 2024-07-23 ENCOUNTER — APPOINTMENT (OUTPATIENT)
Dept: RADIOLOGY | Facility: HOSPITAL | Age: 49
End: 2024-07-23
Payer: COMMERCIAL

## 2024-07-23 DIAGNOSIS — R55 SYNCOPE, UNSPECIFIED SYNCOPE TYPE: Primary | ICD-10-CM

## 2024-07-23 DIAGNOSIS — J45.50 SEVERE PERSISTENT ASTHMA WITHOUT COMPLICATION (MULTI): ICD-10-CM

## 2024-07-23 DIAGNOSIS — G43.909 MIGRAINE WITHOUT STATUS MIGRAINOSUS, NOT INTRACTABLE, UNSPECIFIED MIGRAINE TYPE: ICD-10-CM

## 2024-07-23 DIAGNOSIS — K21.9 GASTROESOPHAGEAL REFLUX DISEASE WITHOUT ESOPHAGITIS: ICD-10-CM

## 2024-07-23 LAB
ALBUMIN SERPL BCP-MCNC: 4.3 G/DL (ref 3.4–5)
ALP SERPL-CCNC: 51 U/L (ref 33–110)
ALT SERPL W P-5'-P-CCNC: 65 U/L (ref 7–45)
ANION GAP SERPL CALC-SCNC: 11 MMOL/L (ref 10–20)
APTT PPP: 33 SECONDS (ref 27–38)
AST SERPL W P-5'-P-CCNC: 33 U/L (ref 9–39)
ATRIAL RATE: 81 BPM
ATRIAL RATE: 89 BPM
BASOPHILS # BLD AUTO: 0.04 X10*3/UL (ref 0–0.1)
BASOPHILS NFR BLD AUTO: 0.5 %
BILIRUB SERPL-MCNC: 0.5 MG/DL (ref 0–1.2)
BNP SERPL-MCNC: 9 PG/ML (ref 0–99)
BUN SERPL-MCNC: 16 MG/DL (ref 6–23)
CALCIUM SERPL-MCNC: 9.4 MG/DL (ref 8.6–10.3)
CARDIAC TROPONIN I PNL SERPL HS: <3 NG/L (ref 0–13)
CARDIAC TROPONIN I PNL SERPL HS: <3 NG/L (ref 0–13)
CHLORIDE SERPL-SCNC: 102 MMOL/L (ref 98–107)
CO2 SERPL-SCNC: 28 MMOL/L (ref 21–32)
CREAT SERPL-MCNC: 0.72 MG/DL (ref 0.5–1.05)
EGFRCR SERPLBLD CKD-EPI 2021: >90 ML/MIN/1.73M*2
EOSINOPHIL # BLD AUTO: 0.19 X10*3/UL (ref 0–0.7)
EOSINOPHIL NFR BLD AUTO: 2.2 %
ERYTHROCYTE [DISTWIDTH] IN BLOOD BY AUTOMATED COUNT: 14 % (ref 11.5–14.5)
GLUCOSE BLD MANUAL STRIP-MCNC: 117 MG/DL (ref 74–99)
GLUCOSE SERPL-MCNC: 101 MG/DL (ref 74–99)
HCT VFR BLD AUTO: 38.2 % (ref 36–46)
HGB BLD-MCNC: 12.7 G/DL (ref 12–16)
IMM GRANULOCYTES # BLD AUTO: 0.02 X10*3/UL (ref 0–0.7)
IMM GRANULOCYTES NFR BLD AUTO: 0.2 % (ref 0–0.9)
INR PPP: 1 (ref 0.9–1.1)
LYMPHOCYTES # BLD AUTO: 3.04 X10*3/UL (ref 1.2–4.8)
LYMPHOCYTES NFR BLD AUTO: 35.6 %
MAGNESIUM SERPL-MCNC: 1.86 MG/DL (ref 1.6–2.4)
MCH RBC QN AUTO: 30.2 PG (ref 26–34)
MCHC RBC AUTO-ENTMCNC: 33.2 G/DL (ref 32–36)
MCV RBC AUTO: 91 FL (ref 80–100)
MONOCYTES # BLD AUTO: 0.75 X10*3/UL (ref 0.1–1)
MONOCYTES NFR BLD AUTO: 8.8 %
NEUTROPHILS # BLD AUTO: 4.51 X10*3/UL (ref 1.2–7.7)
NEUTROPHILS NFR BLD AUTO: 52.7 %
NRBC BLD-RTO: 0 /100 WBCS (ref 0–0)
P AXIS: 54 DEGREES
P AXIS: 56 DEGREES
P OFFSET: 178 MS
P OFFSET: 196 MS
P ONSET: 124 MS
P ONSET: 142 MS
PLATELET # BLD AUTO: 306 X10*3/UL (ref 150–450)
POTASSIUM SERPL-SCNC: 3.8 MMOL/L (ref 3.5–5.3)
PR INTERVAL: 162 MS
PR INTERVAL: 162 MS
PROT SERPL-MCNC: 6.7 G/DL (ref 6.4–8.2)
PROTHROMBIN TIME: 11 SECONDS (ref 9.8–12.8)
Q ONSET: 205 MS
Q ONSET: 223 MS
QRS COUNT: 14 BEATS
QRS COUNT: 15 BEATS
QRS DURATION: 78 MS
QRS DURATION: 88 MS
QT INTERVAL: 374 MS
QT INTERVAL: 384 MS
QTC CALCULATION(BAZETT): 446 MS
QTC CALCULATION(BAZETT): 455 MS
QTC FREDERICIA: 424 MS
QTC FREDERICIA: 426 MS
R AXIS: 53 DEGREES
R AXIS: 57 DEGREES
RBC # BLD AUTO: 4.2 X10*6/UL (ref 4–5.2)
SARS-COV-2 RNA RESP QL NAA+PROBE: NOT DETECTED
SODIUM SERPL-SCNC: 137 MMOL/L (ref 136–145)
T AXIS: 35 DEGREES
T AXIS: 39 DEGREES
T OFFSET: 397 MS
T OFFSET: 410 MS
VENTRICULAR RATE: 81 BPM
VENTRICULAR RATE: 89 BPM
WBC # BLD AUTO: 8.6 X10*3/UL (ref 4.4–11.3)

## 2024-07-23 PROCEDURE — 82947 ASSAY GLUCOSE BLOOD QUANT: CPT

## 2024-07-23 PROCEDURE — 84484 ASSAY OF TROPONIN QUANT: CPT | Performed by: EMERGENCY MEDICINE

## 2024-07-23 PROCEDURE — 2500000001 HC RX 250 WO HCPCS SELF ADMINISTERED DRUGS (ALT 637 FOR MEDICARE OP)

## 2024-07-23 PROCEDURE — 71045 X-RAY EXAM CHEST 1 VIEW: CPT | Performed by: RADIOLOGY

## 2024-07-23 PROCEDURE — 93005 ELECTROCARDIOGRAM TRACING: CPT

## 2024-07-23 PROCEDURE — 2550000001 HC RX 255 CONTRASTS: Performed by: EMERGENCY MEDICINE

## 2024-07-23 PROCEDURE — 83735 ASSAY OF MAGNESIUM: CPT | Performed by: EMERGENCY MEDICINE

## 2024-07-23 PROCEDURE — 36415 COLL VENOUS BLD VENIPUNCTURE: CPT | Performed by: EMERGENCY MEDICINE

## 2024-07-23 PROCEDURE — 70553 MRI BRAIN STEM W/O & W/DYE: CPT | Performed by: RADIOLOGY

## 2024-07-23 PROCEDURE — 71275 CT ANGIOGRAPHY CHEST: CPT | Performed by: STUDENT IN AN ORGANIZED HEALTH CARE EDUCATION/TRAINING PROGRAM

## 2024-07-23 PROCEDURE — 70553 MRI BRAIN STEM W/O & W/DYE: CPT

## 2024-07-23 PROCEDURE — 96375 TX/PRO/DX INJ NEW DRUG ADDON: CPT | Mod: 59

## 2024-07-23 PROCEDURE — 70450 CT HEAD/BRAIN W/O DYE: CPT

## 2024-07-23 PROCEDURE — 83880 ASSAY OF NATRIURETIC PEPTIDE: CPT | Performed by: EMERGENCY MEDICINE

## 2024-07-23 PROCEDURE — 2500000002 HC RX 250 W HCPCS SELF ADMINISTERED DRUGS (ALT 637 FOR MEDICARE OP, ALT 636 FOR OP/ED)

## 2024-07-23 PROCEDURE — 71045 X-RAY EXAM CHEST 1 VIEW: CPT

## 2024-07-23 PROCEDURE — 94640 AIRWAY INHALATION TREATMENT: CPT

## 2024-07-23 PROCEDURE — 96372 THER/PROPH/DIAG INJ SC/IM: CPT

## 2024-07-23 PROCEDURE — 84075 ASSAY ALKALINE PHOSPHATASE: CPT | Performed by: EMERGENCY MEDICINE

## 2024-07-23 PROCEDURE — 99222 1ST HOSP IP/OBS MODERATE 55: CPT | Performed by: STUDENT IN AN ORGANIZED HEALTH CARE EDUCATION/TRAINING PROGRAM

## 2024-07-23 PROCEDURE — 70450 CT HEAD/BRAIN W/O DYE: CPT | Performed by: STUDENT IN AN ORGANIZED HEALTH CARE EDUCATION/TRAINING PROGRAM

## 2024-07-23 PROCEDURE — G0378 HOSPITAL OBSERVATION PER HR: HCPCS

## 2024-07-23 PROCEDURE — 2500000002 HC RX 250 W HCPCS SELF ADMINISTERED DRUGS (ALT 637 FOR MEDICARE OP, ALT 636 FOR OP/ED): Performed by: STUDENT IN AN ORGANIZED HEALTH CARE EDUCATION/TRAINING PROGRAM

## 2024-07-23 PROCEDURE — A9575 INJ GADOTERATE MEGLUMI 0.1ML: HCPCS | Performed by: STUDENT IN AN ORGANIZED HEALTH CARE EDUCATION/TRAINING PROGRAM

## 2024-07-23 PROCEDURE — 71275 CT ANGIOGRAPHY CHEST: CPT

## 2024-07-23 PROCEDURE — 99285 EMERGENCY DEPT VISIT HI MDM: CPT

## 2024-07-23 PROCEDURE — 87635 SARS-COV-2 COVID-19 AMP PRB: CPT | Performed by: EMERGENCY MEDICINE

## 2024-07-23 PROCEDURE — 85610 PROTHROMBIN TIME: CPT | Performed by: EMERGENCY MEDICINE

## 2024-07-23 PROCEDURE — 2500000004 HC RX 250 GENERAL PHARMACY W/ HCPCS (ALT 636 FOR OP/ED)

## 2024-07-23 PROCEDURE — 96374 THER/PROPH/DIAG INJ IV PUSH: CPT | Mod: 59

## 2024-07-23 PROCEDURE — 85025 COMPLETE CBC W/AUTO DIFF WBC: CPT | Performed by: EMERGENCY MEDICINE

## 2024-07-23 PROCEDURE — 2550000001 HC RX 255 CONTRASTS: Performed by: STUDENT IN AN ORGANIZED HEALTH CARE EDUCATION/TRAINING PROGRAM

## 2024-07-23 PROCEDURE — 85730 THROMBOPLASTIN TIME PARTIAL: CPT | Performed by: EMERGENCY MEDICINE

## 2024-07-23 RX ORDER — PROCHLORPERAZINE EDISYLATE 5 MG/ML
10 INJECTION INTRAMUSCULAR; INTRAVENOUS ONCE
Status: CANCELLED | OUTPATIENT
Start: 2024-07-23 | End: 2024-07-23

## 2024-07-23 RX ORDER — ENOXAPARIN SODIUM 100 MG/ML
40 INJECTION SUBCUTANEOUS EVERY 12 HOURS SCHEDULED
Status: DISCONTINUED | OUTPATIENT
Start: 2024-07-23 | End: 2024-07-24 | Stop reason: HOSPADM

## 2024-07-23 RX ORDER — LANOLIN ALCOHOL/MO/W.PET/CERES
400 CREAM (GRAM) TOPICAL DAILY
Status: DISCONTINUED | OUTPATIENT
Start: 2024-07-23 | End: 2024-07-24 | Stop reason: HOSPADM

## 2024-07-23 RX ORDER — POLYETHYLENE GLYCOL 3350 17 G/17G
17 POWDER, FOR SOLUTION ORAL DAILY
Status: DISCONTINUED | OUTPATIENT
Start: 2024-07-23 | End: 2024-07-24 | Stop reason: HOSPADM

## 2024-07-23 RX ORDER — DIPHENHYDRAMINE HYDROCHLORIDE 50 MG/ML
25 INJECTION INTRAMUSCULAR; INTRAVENOUS ONCE
Status: CANCELLED | OUTPATIENT
Start: 2024-07-23 | End: 2024-07-23

## 2024-07-23 RX ORDER — IPRATROPIUM BROMIDE AND ALBUTEROL SULFATE 2.5; .5 MG/3ML; MG/3ML
3 SOLUTION RESPIRATORY (INHALATION)
Status: DISCONTINUED | OUTPATIENT
Start: 2024-07-23 | End: 2024-07-24 | Stop reason: HOSPADM

## 2024-07-23 RX ORDER — METOCLOPRAMIDE HYDROCHLORIDE 5 MG/ML
10 INJECTION INTRAMUSCULAR; INTRAVENOUS ONCE
Status: COMPLETED | OUTPATIENT
Start: 2024-07-23 | End: 2024-07-23

## 2024-07-23 RX ORDER — FORMOTEROL FUMARATE DIHYDRATE 20 UG/2ML
20 SOLUTION RESPIRATORY (INHALATION)
Status: DISCONTINUED | OUTPATIENT
Start: 2024-07-23 | End: 2024-07-24 | Stop reason: HOSPADM

## 2024-07-23 RX ORDER — FLUTICASONE FUROATE AND VILANTEROL 200; 25 UG/1; UG/1
1 POWDER RESPIRATORY (INHALATION)
Status: DISCONTINUED | OUTPATIENT
Start: 2024-07-23 | End: 2024-07-23

## 2024-07-23 RX ORDER — KETOROLAC TROMETHAMINE 15 MG/ML
15 INJECTION, SOLUTION INTRAMUSCULAR; INTRAVENOUS ONCE
Status: CANCELLED | OUTPATIENT
Start: 2024-07-23 | End: 2024-07-23

## 2024-07-23 RX ORDER — BUDESONIDE 0.5 MG/2ML
0.5 INHALANT ORAL
Status: DISCONTINUED | OUTPATIENT
Start: 2024-07-23 | End: 2024-07-24 | Stop reason: HOSPADM

## 2024-07-23 RX ORDER — KETOROLAC TROMETHAMINE 30 MG/ML
30 INJECTION, SOLUTION INTRAMUSCULAR; INTRAVENOUS ONCE
Status: DISCONTINUED | OUTPATIENT
Start: 2024-07-23 | End: 2024-07-23

## 2024-07-23 RX ORDER — NYSTATIN 100000 [USP'U]/ML
500000 SUSPENSION ORAL 4 TIMES DAILY
Status: DISCONTINUED | OUTPATIENT
Start: 2024-07-23 | End: 2024-07-24 | Stop reason: HOSPADM

## 2024-07-23 RX ORDER — PANTOPRAZOLE SODIUM 40 MG/1
80 TABLET, DELAYED RELEASE ORAL DAILY
Status: DISCONTINUED | OUTPATIENT
Start: 2024-07-24 | End: 2024-07-24 | Stop reason: HOSPADM

## 2024-07-23 RX ORDER — MONTELUKAST SODIUM 10 MG/1
10 TABLET ORAL DAILY
Status: DISCONTINUED | OUTPATIENT
Start: 2024-07-24 | End: 2024-07-24 | Stop reason: HOSPADM

## 2024-07-23 RX ORDER — GADOTERATE MEGLUMINE 376.9 MG/ML
20 INJECTION INTRAVENOUS
Status: COMPLETED | OUTPATIENT
Start: 2024-07-23 | End: 2024-07-23

## 2024-07-23 RX ORDER — DIPHENHYDRAMINE HCL 25 MG
25 TABLET ORAL ONCE
Status: COMPLETED | OUTPATIENT
Start: 2024-07-23 | End: 2024-07-23

## 2024-07-23 RX ORDER — KETOROLAC TROMETHAMINE 30 MG/ML
30 INJECTION, SOLUTION INTRAMUSCULAR; INTRAVENOUS ONCE
Status: COMPLETED | OUTPATIENT
Start: 2024-07-23 | End: 2024-07-23

## 2024-07-23 RX ORDER — KETOROLAC TROMETHAMINE 15 MG/ML
15 INJECTION, SOLUTION INTRAMUSCULAR; INTRAVENOUS ONCE
Status: DISCONTINUED | OUTPATIENT
Start: 2024-07-23 | End: 2024-07-23

## 2024-07-23 SDOH — SOCIAL STABILITY: SOCIAL INSECURITY: HAVE YOU HAD ANY THOUGHTS OF HARMING ANYONE ELSE?: NO

## 2024-07-23 SDOH — SOCIAL STABILITY: SOCIAL INSECURITY: DO YOU FEEL UNSAFE GOING BACK TO THE PLACE WHERE YOU ARE LIVING?: NO

## 2024-07-23 SDOH — SOCIAL STABILITY: SOCIAL INSECURITY: DO YOU FEEL ANYONE HAS EXPLOITED OR TAKEN ADVANTAGE OF YOU FINANCIALLY OR OF YOUR PERSONAL PROPERTY?: NO

## 2024-07-23 SDOH — SOCIAL STABILITY: SOCIAL INSECURITY: ABUSE: ADULT

## 2024-07-23 SDOH — SOCIAL STABILITY: SOCIAL INSECURITY: ARE YOU OR HAVE YOU BEEN THREATENED OR ABUSED PHYSICALLY, EMOTIONALLY, OR SEXUALLY BY ANYONE?: NO

## 2024-07-23 SDOH — SOCIAL STABILITY: SOCIAL INSECURITY: HAS ANYONE EVER THREATENED TO HURT YOUR FAMILY OR YOUR PETS?: NO

## 2024-07-23 SDOH — SOCIAL STABILITY: SOCIAL INSECURITY: ARE THERE ANY APPARENT SIGNS OF INJURIES/BEHAVIORS THAT COULD BE RELATED TO ABUSE/NEGLECT?: NO

## 2024-07-23 SDOH — SOCIAL STABILITY: SOCIAL INSECURITY: HAVE YOU HAD THOUGHTS OF HARMING ANYONE ELSE?: NO

## 2024-07-23 SDOH — SOCIAL STABILITY: SOCIAL INSECURITY: DOES ANYONE TRY TO KEEP YOU FROM HAVING/CONTACTING OTHER FRIENDS OR DOING THINGS OUTSIDE YOUR HOME?: NO

## 2024-07-23 ASSESSMENT — COGNITIVE AND FUNCTIONAL STATUS - GENERAL
DAILY ACTIVITIY SCORE: 24
MOBILITY SCORE: 24

## 2024-07-23 ASSESSMENT — LIFESTYLE VARIABLES
AUDIT-C TOTAL SCORE: 1
SUBSTANCE_ABUSE_PAST_12_MONTHS: NO
EVER HAD A DRINK FIRST THING IN THE MORNING TO STEADY YOUR NERVES TO GET RID OF A HANGOVER: NO
SKIP TO QUESTIONS 9-10: 1
PRESCIPTION_ABUSE_PAST_12_MONTHS: NO
AUDIT-C TOTAL SCORE: 1
HAVE YOU EVER FELT YOU SHOULD CUT DOWN ON YOUR DRINKING: NO
HOW MANY STANDARD DRINKS CONTAINING ALCOHOL DO YOU HAVE ON A TYPICAL DAY: PATIENT DOES NOT DRINK
HOW OFTEN DO YOU HAVE A DRINK CONTAINING ALCOHOL: MONTHLY OR LESS
HAVE PEOPLE ANNOYED YOU BY CRITICIZING YOUR DRINKING: NO
EVER FELT BAD OR GUILTY ABOUT YOUR DRINKING: NO
HOW OFTEN DO YOU HAVE 6 OR MORE DRINKS ON ONE OCCASION: NEVER
TOTAL SCORE: 0

## 2024-07-23 ASSESSMENT — ACTIVITIES OF DAILY LIVING (ADL)
JUDGMENT_ADEQUATE_SAFELY_COMPLETE_DAILY_ACTIVITIES: YES
HEARING - LEFT EAR: FUNCTIONAL
DRESSING YOURSELF: INDEPENDENT
ADEQUATE_TO_COMPLETE_ADL: YES
WALKS IN HOME: INDEPENDENT
BATHING: INDEPENDENT
TOILETING: INDEPENDENT
GROOMING: INDEPENDENT
HEARING - RIGHT EAR: FUNCTIONAL
FEEDING YOURSELF: INDEPENDENT
PATIENT'S MEMORY ADEQUATE TO SAFELY COMPLETE DAILY ACTIVITIES?: YES

## 2024-07-23 ASSESSMENT — PAIN SCALES - GENERAL
PAINLEVEL_OUTOF10: 5 - MODERATE PAIN
PAINLEVEL_OUTOF10: 7

## 2024-07-23 ASSESSMENT — ENCOUNTER SYMPTOMS
CONFUSION: 1
WEAKNESS: 1
NAUSEA: 1
VOMITING: 0
HEADACHES: 1
ABDOMINAL PAIN: 0
DIARRHEA: 0
SHORTNESS OF BREATH: 1
NUMBNESS: 0
DYSURIA: 0
CONSTIPATION: 0

## 2024-07-23 ASSESSMENT — COLUMBIA-SUICIDE SEVERITY RATING SCALE - C-SSRS
6. HAVE YOU EVER DONE ANYTHING, STARTED TO DO ANYTHING, OR PREPARED TO DO ANYTHING TO END YOUR LIFE?: NO
6. HAVE YOU EVER DONE ANYTHING, STARTED TO DO ANYTHING, OR PREPARED TO DO ANYTHING TO END YOUR LIFE?: NO
2. HAVE YOU ACTUALLY HAD ANY THOUGHTS OF KILLING YOURSELF?: NO
2. HAVE YOU ACTUALLY HAD ANY THOUGHTS OF KILLING YOURSELF?: NO
1. IN THE PAST MONTH, HAVE YOU WISHED YOU WERE DEAD OR WISHED YOU COULD GO TO SLEEP AND NOT WAKE UP?: NO
1. IN THE PAST MONTH, HAVE YOU WISHED YOU WERE DEAD OR WISHED YOU COULD GO TO SLEEP AND NOT WAKE UP?: NO

## 2024-07-23 ASSESSMENT — PAIN SCALES - PAIN ASSESSMENT IN ADVANCED DEMENTIA (PAINAD): TOTALSCORE: MEDICATION (SEE MAR)

## 2024-07-23 ASSESSMENT — PAIN DESCRIPTION - PAIN TYPE: TYPE: ACUTE PAIN

## 2024-07-23 ASSESSMENT — PAIN DESCRIPTION - LOCATION
LOCATION: HEAD
LOCATION: HEAD

## 2024-07-23 ASSESSMENT — PAIN - FUNCTIONAL ASSESSMENT: PAIN_FUNCTIONAL_ASSESSMENT: 0-10

## 2024-07-23 NOTE — H&P
"History Of Present Illness  Rema Silver is a 49 y.o. female with PMHx of severe asthma, no intubation Hx, Provoked R knee DVT which turned into PE 4 years ago off AC now, vocal cord dysfunction, and endometriosis who presents to [facility] for syncope. Patient reports that yesterday, she was laughing so hard that she passed out. She had a LOC of 30 seconds and when she woke up it took her some time to recall where she was or what happened. Reports associated darkening of her vision, SOB, and nausea. After this event, she went home and took a nap and felt improved. Patient reports that she has \"blacked out\" due to laughing many times before, and last episode of LOC due to laughing was about 8 years ago. Patient also notes that she had a mild headache yesterday. This morning, she woke up with a more intense headache which as since improved. It is a left sided fontal headache which radiates to the back of the left side of her head. Describes it as a \"warm and tingly feeling\". She took 4 pills of Advil this morning with no relief. Patient reports that she currently feels \"weak all over\", and feels like she is mentally slow. She denies hearing changes, CP, jaw pain, numbness/tingling, urinary or bowel symptoms. She notes that she usually drinks alcohol occasionally but she drank more than usual while she was on vacation recently. Does not use tobacco, drinks alcohol occasionally, and denies illicit drug use.     Of note, patient follows with Dr. Khan for her severe asthma.     In the ED, Temp of 96.8 F, HR 92, RR 20, /82, O2 98% on RA. Labs unremarkable. COVID negative. Troponin negative. EKG showed NSR with a ventricular rate of 89. . QTc 455. No ST changes. CXR showed no active disease in chest. CTA PE showed no PE and diffuse hepatic steatosis. CT head showed no acute abnormalities. Neurology was consulted and patient was admitted for MRI.       Full Code      Past Medical History  She has a past medical " history of Asthma ([facility]) and Personal history of other diseases of the respiratory system.    Surgical History  She has a past surgical history that includes Esophagogastroduodenoscopy (2014); Other surgical history (2020); Laparoscopy diagnostic / biopsy / aspiration / lysis (2014); Breast surgery (2014);  section, classic (2014); and Umbilical hernia repair (2014).     Social History  She reports that she has never smoked. She has never used smokeless tobacco. She reports current alcohol use. She reports that she does not use drugs. Patient lives at home with her daughter and pets.     Family History  Family History   Problem Relation Name Age of Onset    HOLLY disease Maternal Grandfather      Newman's esophagus Maternal Grandfather      Asthma Other Aunt         Allergies  Opioids - morphine analogues    Review of Systems   HENT:  Negative for hearing loss.    Respiratory:  Positive for shortness of breath.    Cardiovascular:  Negative for chest pain.   Gastrointestinal:  Positive for nausea. Negative for abdominal pain, constipation, diarrhea and vomiting.   Genitourinary:  Negative for dysuria.   Neurological:  Positive for weakness and headaches. Negative for numbness.   Psychiatric/Behavioral:  Positive for confusion.         Physical Exam  Constitutional:       General: She is not in acute distress.     Appearance: Normal appearance.   HENT:      Head: Normocephalic and atraumatic.   Cardiovascular:      Rate and Rhythm: Normal rate and regular rhythm.      Pulses: Normal pulses.      Heart sounds: Normal heart sounds.   Pulmonary:      Effort: Pulmonary effort is normal. No respiratory distress.      Breath sounds: Normal breath sounds. No wheezing.   Abdominal:      General: Abdomen is flat. There is no distension.      Palpations: Abdomen is soft.      Tenderness: There is no abdominal tenderness. There is no guarding or rebound.   Skin:     General: Skin is  warm and dry.   Neurological:      General: No focal deficit present.      Mental Status: She is alert and oriented to person, place, and time.      Cranial Nerves: No cranial nerve deficit.      Sensory: No sensory deficit.      Motor: No weakness.   Psychiatric:         Mood and Affect: Mood normal.         Behavior: Behavior normal.      Comments: Patient appears to be speaking at a slower rate than on prior exams.           Last Recorded Vitals  /55   Pulse 82   Temp 36 °C (96.8 °F) (Temporal)   Resp 20   Wt 99.8 kg (220 lb)   SpO2 98%     Relevant Results    Scheduled medications  budesonide, 0.5 mg, nebulization, BID  enoxaparin, 40 mg, subcutaneous, q12h EVELYN  formoterol, 20 mcg, nebulization, BID  ipratropium-albuteroL, 3 mL, nebulization, q4h  magnesium oxide, 400 mg, oral, Daily  [START ON 7/24/2024] montelukast, 10 mg, oral, Daily  nystatin, 500,000 Units, oral, 4x daily  [START ON 7/24/2024] pantoprazole, 80 mg, oral, Daily  polyethylene glycol, 17 g, oral, Daily      Continuous medications     PRN medications    Results for orders placed or performed during the hospital encounter of 07/23/24 (from the past 24 hour(s))   POCT GLUCOSE   Result Value Ref Range    POCT Glucose 117 (H) 74 - 99 mg/dL   CBC and Auto Differential   Result Value Ref Range    WBC 8.6 4.4 - 11.3 x10*3/uL    nRBC 0.0 0.0 - 0.0 /100 WBCs    RBC 4.20 4.00 - 5.20 x10*6/uL    Hemoglobin 12.7 12.0 - 16.0 g/dL    Hematocrit 38.2 36.0 - 46.0 %    MCV 91 80 - 100 fL    MCH 30.2 26.0 - 34.0 pg    MCHC 33.2 32.0 - 36.0 g/dL    RDW 14.0 11.5 - 14.5 %    Platelets 306 150 - 450 x10*3/uL    Neutrophils % 52.7 40.0 - 80.0 %    Immature Granulocytes %, Automated 0.2 0.0 - 0.9 %    Lymphocytes % 35.6 13.0 - 44.0 %    Monocytes % 8.8 2.0 - 10.0 %    Eosinophils % 2.2 0.0 - 6.0 %    Basophils % 0.5 0.0 - 2.0 %    Neutrophils Absolute 4.51 1.20 - 7.70 x10*3/uL    Immature Granulocytes Absolute, Automated 0.02 0.00 - 0.70 x10*3/uL     Lymphocytes Absolute 3.04 1.20 - 4.80 x10*3/uL    Monocytes Absolute 0.75 0.10 - 1.00 x10*3/uL    Eosinophils Absolute 0.19 0.00 - 0.70 x10*3/uL    Basophils Absolute 0.04 0.00 - 0.10 x10*3/uL   Comprehensive Metabolic Panel   Result Value Ref Range    Glucose 101 (H) 74 - 99 mg/dL    Sodium 137 136 - 145 mmol/L    Potassium 3.8 3.5 - 5.3 mmol/L    Chloride 102 98 - 107 mmol/L    Bicarbonate 28 21 - 32 mmol/L    Anion Gap 11 10 - 20 mmol/L    Urea Nitrogen 16 6 - 23 mg/dL    Creatinine 0.72 0.50 - 1.05 mg/dL    eGFR >90 >60 mL/min/1.73m*2    Calcium 9.4 8.6 - 10.3 mg/dL    Albumin 4.3 3.4 - 5.0 g/dL    Alkaline Phosphatase 51 33 - 110 U/L    Total Protein 6.7 6.4 - 8.2 g/dL    AST 33 9 - 39 U/L    Bilirubin, Total 0.5 0.0 - 1.2 mg/dL    ALT 65 (H) 7 - 45 U/L   Magnesium   Result Value Ref Range    Magnesium 1.86 1.60 - 2.40 mg/dL   aPTT   Result Value Ref Range    aPTT 33 27 - 38 seconds   Protime-INR   Result Value Ref Range    Protime 11.0 9.8 - 12.8 seconds    INR 1.0 0.9 - 1.1   B-Type Natriuretic Peptide   Result Value Ref Range    BNP 9 0 - 99 pg/mL   Troponin I, High Sensitivity, Initial   Result Value Ref Range    Troponin I, High Sensitivity <3 0 - 13 ng/L   ECG 12 lead   Result Value Ref Range    Ventricular Rate 89 BPM    Atrial Rate 89 BPM    RI Interval 162 ms    QRS Duration 78 ms    QT Interval 374 ms    QTC Calculation(Bazett) 455 ms    P Axis 54 degrees    R Axis 57 degrees    T Axis 39 degrees    QRS Count 15 beats    Q Onset 223 ms    P Onset 142 ms    P Offset 196 ms    T Offset 410 ms    QTC Fredericia 426 ms   Sars-CoV-2 PCR   Result Value Ref Range    Coronavirus 2019, PCR Not Detected Not Detected   ECG 12 lead   Result Value Ref Range    Ventricular Rate 81 BPM    Atrial Rate 81 BPM    RI Interval 162 ms    QRS Duration 88 ms    QT Interval 384 ms    QTC Calculation(Bazett) 446 ms    P Axis 56 degrees    R Axis 53 degrees    T Axis 35 degrees    QRS Count 14 beats    Q Onset 205 ms    P  Onset 124 ms    P Offset 178 ms    T Offset 397 ms    QTC Fredericia 424 ms   Troponin, High Sensitivity, 1 Hour   Result Value Ref Range    Troponin I, High Sensitivity <3 0 - 13 ng/L     CT head wo IV contrast    Result Date: 7/23/2024  Interpreted By:  Tayo Larson, STUDY: CT HEAD WO IV CONTRAST;  7/23/2024 12:00 pm   INDICATION: Signs/Symptoms:syncope, HA, confusion.   COMPARISON: CT brain 09/01/2020   ACCESSION NUMBER(S): [id number]   ORDERING CLINICIAN: GROVER ROJO   TECHNIQUE: Noncontrast axial CT scan of head was performed.   FINDINGS: Parenchyma: There is no intracranial hemorrhage. The grey-white differentiation is intact. There is no mass effect or midline shift.   CSF Spaces: The ventricles, sulci and basal cisterns are within normal limits for age.   Extra-Axial Fluid: There is no extraaxial fluid collection.   Calvarium: The calvarium is unremarkable.   Paranasal sinuses: Visualized paranasal sinuses are clear.   Mastoids: Clear.   Orbits: Normal.   Soft tissues: Unremarkable.       No acute intracranial hemorrhage, mass effect, or CT apparent acute infarct.   Signed by: Tayo Larson 7/23/2024 12:30 PM Dictation workstation:   [id number]    CT angio chest for pulmonary embolism    Result Date: 7/23/2024  Interpreted By:  Tayo Larson, STUDY: CT ANGIO CHEST FOR PULMONARY EMBOLISM;  7/23/2024 12:00 pm   INDICATION: Signs/Symptoms:syncope, Hx PE.   COMPARISON: CT chest 01/27/2024   ACCESSION NUMBER(S): [id number]   ORDERING CLINICIAN: GROVER ROJO   TECHNIQUE: Helical data acquisition of the chest was obtained after administration of 75ml IV Omnipaque 350. Images were reformatted in coronal and sagittal planes. Axial and coronal MIP images were created and reviewed.   FINDINGS: POTENTIAL LIMITATIONS OF THE STUDY: None   HEART AND VESSELS: No discrete filling defects within the main pulmonary artery or its branches.   Main pulmonary artery and its branches are normal in caliber.    The thoracic aorta is of normal course and caliber without vascular calcifications.   No coronary artery calcifications are seen.The study is not optimized for evaluation of coronary arteries.   The cardiac chambers are not enlarged.   No evidence of pericardial effusion.   MEDIASTINUM AND JOAN, LOWER NECK AND AXILLA: The visualized thyroid gland is within normal limits.   No evidence of thoracic lymphadenopathy by CT criteria.   Esophagus appears within normal limits as seen.   LUNGS AND AIRWAYS: The trachea and central airways are patent. No endobronchial lesion.Patchy opacities in the lung bases, compatible atelectasis. No suspicious pulmonary nodule. No focal consolidation. No pneumothorax. No pleural effusion.   UPPER ABDOMEN: Diffuse steatosis.   CHEST WALL AND OSSEOUS STRUCTURES: There are no suspicious osseous lesions. Multilevel degenerative changes are present       1.  No evidence of pulmonary embolism or acute findings in the thorax. 2. Diffuse hepatic steatosis     Signed by: Tayo Larson 7/23/2024 12:27 PM Dictation workstation:   [id number]    ECG 12 lead    Result Date: 7/23/2024  Normal sinus rhythm Cannot rule out Anterior infarct (cited on or before 23-JUL-2024) Abnormal ECG When compared with ECG of 23-JUL-2024 09:57, (unconfirmed) No significant change was found    ECG 12 lead    Result Date: 7/23/2024  Normal sinus rhythm Possible Left atrial enlargement Cannot rule out Anterior infarct , age undetermined Abnormal ECG When compared with ECG of 27-JAN-2024 19:24, No significant change was found    XR chest 1 view    Result Date: 7/23/2024  Interpreted By:  Deandre Maldonado, STUDY: XR CHEST 1 VIEW;  7/23/2024 10:36 am   INDICATION: Signs/Symptoms:syncope.   COMPARISON: 01/27/2024   ACCESSION NUMBER(S): [id number]   ORDERING CLINICIAN: GROVER ROJO   FINDINGS: The lungs appear clear. No apparent pleural effusion. Cardiomediastinal silhouette unchanged. No pulmonary vascular congestion.        No active disease in the chest identified.   MACRO: None   Signed by: Deandre Maldonado 7/23/2024 11:25 AM Dictation workstation:   [id number]        Assessment/Plan   Patient is a 49 year old female with Pmhx of severe asthma, no intubation Hx, Provoked R knee DVT which turned into PE 4 years ago off AC now, vocal cord dysfunction, and endometriosis who presented to [facility] for syncope. Patient was hemodynamically stable in the ED. Labs were unremarkable. Negative troponin and BNP. EKG, CXR, and CTA were unremarkable. Patient was admitted for syncope. Neurology was consulted and an MRI was ordered.     #Syncope  #Headache  #Hx of severe asthma  #Hx of vocal cord dysfunction   Reports syncope yesterday while laughing with associated LOC lasting 30 seconds. Associated headache, confusion, SOB, and nausea. Likely experienced hypoxemia while laughing which led to syncope. Likely increased risk of hypoxemia due to patient's history of asthma and vocal cord dysfunction. Patient's headache is likely due to migraine with aura as it is unilateral and associated with a tingling feeling, confusion, and and nausea. Syncope less less likely due to stroke as patient's neuro exam and CT head was normal. Low concern for cardiogenic etiology as troponin and BNP were negative and EKG, CXR, and CTA were unremarkable.    -BNP, troponin,  INR, Protime, aPTT wnl   -EKG- NSR with a ventricular rate of 89. . QTc 455. No ST changes  -CXR- no active disease in chest  -CT head- no acute abnormalities  -CT PE- no PE  -MRI ordered  -Neurology consulted-appreciate recs  -Telemetry    #Hx of DVT and PE  Not on current AC at home. PE was ruled out via CTA in ED.   -DVT prophylaxis with Levonox     Global Plan of Care  IVF: none  Diet: Regular   DVT prophylaxis: Lovenox   Consults: Neurology   Code Status: FULL code    Dispo: Awaiting MRI and neurology recs. Anticipate 1 midnight stay.       Yaa Martinez,   Internal Medicine, PGY1

## 2024-07-23 NOTE — CARE PLAN
The patient's goals for the shift include  remain  afebrile    The clinical goals for the shift include  no  neuro deficit

## 2024-07-23 NOTE — ED PROVIDER NOTES
"EMERGENCY DEPARTMENT ENCOUNTER      Pt Name: Rema Silver  MRN: [id number]  Birthdate 1975  Date of evaluation: 7/23/2024  Provider: Horace Martinez DO    CHIEF COMPLAINT       Chief Complaint   Patient presents with    Syncope     Yesterday. + LOC for approx 20 seconds.   Stated still not feeling well this AM. Generalized weakness.        HISTORY OF PRESENT ILLNESS    Rema Silver is a 49 y.o. year old female who presents to the ER for syncope. Yesterday laughed so hard she passed out for 20 seconds. At approx 1300. When she came to, didn't remember where she was or what had happened. This AM has had L frontal headache, up to 8/10, now down to 5/10, feels warm, radiates to base of head, feels sleepy, confused, foggy, like talking takes increased effort.   No chest pain, + dyspnea, no abdominal pain, + nausea, - vomiting.  Yesterday had b/l \"fuzzy\" vision, has resolved.  No falls, no head injuries    PMH severe asthma, no intubation HX, Provoked R knee DVT which turned into PE 4 years ago off AC now, vocal cord dysfunction, endometriosis  PSH R knee meninscectomy, umbilical herniorrhaphy, c/s, ex laparoscopy, breast reduction  No hx tobacco or drugs, social EtOH       PAST MEDICAL HISTORY     Past Medical History:   Diagnosis Date    Asthma ([facility])     Personal history of other diseases of the respiratory system     History of acute bronchitis     CURRENT MEDICATIONS       Current Discharge Medication List        CONTINUE these medications which have NOT CHANGED    Details   cetirizine (ZyrTEC) 10 mg tablet Take 1 tablet (10 mg) by mouth once daily.      cyanocobalamin (Vitamin B-12) 1,000 mcg tablet Take 1 tablet (1,000 mcg) by mouth once daily.      fluticasone propion-salmeteroL (Advair Diskus) 500-50 mcg/dose diskus inhaler Inhale 1 puff 2 times a day. Rinse mouth with water after use to reduce aftertaste and incidence of candidiasis. Do not swallow.  Qty: 60 each, Refills: 11    Associated " Diagnoses: Severe persistent asthma with acute exacerbation (Multi)      ipratropium-albuteroL (Duo-Neb) 0.5-2.5 mg/3 mL nebulizer solution Take 3 mL by nebulization see administration instructions. Every 4-6 hours prn  Qty: 360 mL, Refills: 6    Associated Diagnoses: Severe persistent asthma without complication (Multi)      magnesium oxide (Mag-Ox) 400 mg (241.3 mg magnesium) tablet Take 1 tablet (400 mg) by mouth once daily.      montelukast (Singulair) 10 mg tablet Take 1 tablet (10 mg) by mouth once daily.  Qty: 90 tablet, Refills: 3    Associated Diagnoses: Severe persistent asthma without complication (Multi)      nystatin (Mycostatin) 100,000 unit/mL suspension Take 5 mL (500,000 Units) by mouth 4 times a day.  Qty: 600 mL, Refills: 0    Associated Diagnoses: Thrush      pantoprazole (ProtoNix) 40 mg EC tablet Take 1 tablet (40 mg) by mouth 2 times a day.  Qty: 60 tablet, Refills: 5    Associated Diagnoses: Gastroesophageal reflux disease without esophagitis      Spiriva Respimat 2.5 mcg/actuation inhaler Inhale 2 puffs once daily.  Qty: 1 each, Refills: 11    Associated Diagnoses: Severe persistent asthma without complication (Multi)      UNABLE TO FIND Take 1 capsule by mouth once daily. Med Name: Dim      tezepelumab-ekko (Tezspire) SubQ syringe Inject 1.9 mL (210 mg) under the skin every 28 (twenty-eight) days.  Qty: 1.9 mL, Refills: 11    Comments: PA # [id number] [id number].  Associated Diagnoses: Severe persistent asthma without complication (Multi)           SURGICAL HISTORY       Past Surgical History:   Procedure Laterality Date    BREAST SURGERY  2014    Breast Surgery Reduction Procedure     SECTION, CLASSIC  2014     Section    ESOPHAGOGASTRODUODENOSCOPY  2014    Diagnostic Esophagogastroduodenoscopy    LAPAROSCOPY DIAGNOSTIC / BIOPSY / ASPIRATION / LYSIS  2014    Exploratory Laparoscopy    OTHER SURGICAL HISTORY  2020    Complete colonoscopy     UMBILICAL HERNIA REPAIR  02/25/2014    Umbilical Hernia Repair     ALLERGIES     Opioids - morphine analogues  FAMILY HISTORY       Family History   Problem Relation Name Age of Onset    HOLLY disease Maternal Grandfather      Newman's esophagus Maternal Grandfather      Asthma Other Aunt      SOCIAL HISTORY       Social History     Tobacco Use    Smoking status: Never    Smokeless tobacco: Never   Substance Use Topics    Alcohol use: Yes     Comment: very rarely    Drug use: Never     PHYSICAL EXAM  (up to 7 for level 4, 8 or more for level 5)     ED Triage Vitals [07/23/24 0941]   Temperature Heart Rate Respirations BP   36 °C (96.8 °F) 92 20 132/82      Pulse Ox Temp Source Heart Rate Source Patient Position   98 % Temporal Monitor Sitting      BP Location FiO2 (%)     Right arm --       Physical Exam  Vitals and nursing note reviewed.   Constitutional:       General: She is not in acute distress.     Appearance: Normal appearance. She is not ill-appearing.   HENT:      Head: Normocephalic and atraumatic. No raccoon eyes, Browne's sign, contusion or laceration.      Jaw: No trismus or malocclusion.      Right Ear: External ear normal.      Left Ear: External ear normal.      Mouth/Throat:      Mouth: Mucous membranes are moist.      Pharynx: Oropharynx is clear.   Eyes:      General: No visual field deficit.     Extraocular Movements: Extraocular movements intact.      Pupils: Pupils are equal, round, and reactive to light.   Neck:      Trachea: No tracheal deviation.   Cardiovascular:      Rate and Rhythm: Normal rate and regular rhythm.      Pulses: Normal pulses.   Pulmonary:      Effort: No respiratory distress.      Breath sounds: No wheezing, rhonchi or rales.   Chest:      Chest wall: No tenderness.   Abdominal:      General: Abdomen is flat.      Palpations: Abdomen is soft. There is no mass.      Tenderness: There is no abdominal tenderness.   Musculoskeletal:         General: No tenderness or signs of  injury.      Right lower leg: No edema.      Left lower leg: No edema.   Skin:     Coloration: Skin is not jaundiced or pale.      Findings: No petechiae, rash or wound.   Neurological:      Mental Status: She is alert and oriented to person, place, and time.      GCS: GCS eye subscore is 4. GCS verbal subscore is 5. GCS motor subscore is 6.      Cranial Nerves: Cranial nerves 2-12 are intact. No cranial nerve deficit, dysarthria or facial asymmetry.      Sensory: Sensation is intact.      Motor: No weakness.      Coordination: Coordination normal. Finger-Nose-Finger Test and Heel to Shin Test normal.      Gait: Gait normal.   Psychiatric:         Speech: Speech normal.         Behavior: Behavior is slowed. Behavior is cooperative.         Thought Content: Thought content does not include homicidal or suicidal ideation.        DIAGNOSTIC RESULTS   LABS:  Labs Reviewed   COMPREHENSIVE METABOLIC PANEL - Abnormal       Result Value    Glucose 101 (*)     Sodium 137      Potassium 3.8      Chloride 102      Bicarbonate 28      Anion Gap 11      Urea Nitrogen 16      Creatinine 0.72      eGFR >90      Calcium 9.4      Albumin 4.3      Alkaline Phosphatase 51      Total Protein 6.7      AST 33      Bilirubin, Total 0.5      ALT 65 (*)    POCT GLUCOSE - Abnormal    POCT Glucose 117 (*)    MAGNESIUM - Normal    Magnesium 1.86     APTT - Normal    aPTT 33      Narrative:     The APTT is no longer used for monitoring Unfractionated Heparin Therapy. For monitoring Heparin Therapy, use the Heparin Assay.   PROTIME-INR - Normal    Protime 11.0      INR 1.0     B-TYPE NATRIURETIC PEPTIDE - Normal    BNP 9      Narrative:        <100 pg/mL - Heart failure unlikely  100-299 pg/mL - Intermediate probability of acute heart                  failure exacerbation. Correlate with clinical                  context and patient history.    >=300 pg/mL - Heart Failure likely. Correlate with clinical                  context and patient  history.    BNP testing is performed using different testing methodology at [facility] than at other [facility]. Direct result comparisons should only be made within the same method.      SERIAL TROPONIN-INITIAL - Normal    Troponin I, High Sensitivity <3      Narrative:     Less than 99th percentile of normal range cutoff-  Female and children under 18 years old <14 ng/L; Male <21 ng/L: Negative  Repeat testing should be performed if clinically indicated.     Female and children under 18 years old 14-50 ng/L; Male 21-50 ng/L:  Consistent with possible cardiac damage and possible increased clinical   risk. Serial measurements may help to assess extent of myocardial damage.     >50 ng/L: Consistent with cardiac damage, increased clinical risk and  myocardial infarction. Serial measurements may help assess extent of   myocardial damage.      NOTE: Children less than 1 year old may have higher baseline troponin   levels and results should be interpreted in conjunction with the overall   clinical context.     NOTE: Troponin I testing is performed using a different   testing methodology at [facility] than at other   [facility]. Direct result comparisons should only   be made within the same method.   SERIAL TROPONIN, 1 HOUR - Normal    Troponin I, High Sensitivity <3      Narrative:     Less than 99th percentile of normal range cutoff-  Female and children under 18 years old <14 ng/L; Male <21 ng/L: Negative  Repeat testing should be performed if clinically indicated.     Female and children under 18 years old 14-50 ng/L; Male 21-50 ng/L:  Consistent with possible cardiac damage and possible increased clinical   risk. Serial measurements may help to assess extent of myocardial damage.     >50 ng/L: Consistent with cardiac damage, increased clinical risk and  myocardial infarction. Serial measurements may help assess extent of   myocardial damage.      NOTE: Children less than 1  year old may have higher baseline troponin   levels and results should be interpreted in conjunction with the overall   clinical context.     NOTE: Troponin I testing is performed using a different   testing methodology at [facility] than at other   [facility] hospitals. Direct result comparisons should only   be made within the same method.   SARS-COV-2 PCR - Normal    Coronavirus 2019, PCR Not Detected      Narrative:     This assay has received FDA Emergency Use Authorization (EUA) and is only authorized for the duration of time that circumstances exist to justify the authorization of the emergency use of in vitro diagnostic tests for the detection of SARS-CoV-2 virus and/or diagnosis of COVID-19 infection under section 564(b)(1) of the Act, 21 U.S.C. 360bbb-3(b)(1). This assay is an in vitro diagnostic nucleic acid amplification test for the qualitative detection of SARS-CoV-2 from nasopharyngeal specimens and has been validated for use at [facility]. Negative results do not preclude COVID-19 infections and should not be used as the sole basis for diagnosis, treatment, or other management decisions.     TROPONIN SERIES- (INITIAL, 1 HR)    Narrative:     The following orders were created for panel order Troponin I Series, High Sensitivity (0, 1 HR).  Procedure                               Abnormality         Status                     ---------                               -----------         ------                     Troponin I, High Sensiti...[[id number]]  Normal              Final result               Troponin, High Sensitivi...[[id number]]  Normal              Final result                 Please view results for these tests on the individual orders.   CBC WITH AUTO DIFFERENTIAL    WBC 8.6      nRBC 0.0      RBC 4.20      Hemoglobin 12.7      Hematocrit 38.2      MCV 91      MCH 30.2      MCHC 33.2      RDW 14.0      Platelets 306      Neutrophils % 52.7      Immature  Granulocytes %, Automated 0.2      Lymphocytes % 35.6      Monocytes % 8.8      Eosinophils % 2.2      Basophils % 0.5      Neutrophils Absolute 4.51      Immature Granulocytes Absolute, Automated 0.02      Lymphocytes Absolute 3.04      Monocytes Absolute 0.75      Eosinophils Absolute 0.19      Basophils Absolute 0.04       All other labs were within normal range or not returned as of this dictation.  Imaging  MR brain w and wo IV contrast   Final Result   No evidence of acute infarct, intracranial mass effect or midline   shift.        MACRO:   None        Signed by: Vera Jones 7/23/2024 4:41 PM   Dictation workstation:   [id number]      CT angio chest for pulmonary embolism   Final Result   1.  No evidence of pulmonary embolism or acute findings in the thorax.   2. Diffuse hepatic steatosis             Signed by: Tayo Larson 7/23/2024 12:27 PM   Dictation workstation:   [id number]      CT head wo IV contrast   Final Result   No acute intracranial hemorrhage, mass effect, or CT apparent acute   infarct.        Signed by: Tayo Larson 7/23/2024 12:30 PM   Dictation workstation:   [id number]      XR chest 1 view   Final Result   No active disease in the chest identified.        MACRO:   None        Signed by: Deandre Maldonado 7/23/2024 11:25 AM   Dictation workstation:   [id number]         Procedure  Procedures  EMERGENCY DEPARTMENT COURSE/MDM:   Medical Decision Making  =================Attending note===============    The patient was seen by the resident/fellow.  I have personally performed a substantive portion of the encounter.  I have seen and examined the patient; agree with the workup, evaluation, MDM,   management and diagnosis.  The care plan has been discussed with the resident; I have reviewed the resident's note and agree with the documented findings.      This is a 49 y.o. female who presents to ER with a syncopal episode yesterday while she was laughing.  She says she was laughing very hard  and passed out.  She has had a left-sided frontal headache today.  She states her right leg feels heavy when she walks.  Is not weak.  Said some nausea without vomiting or diarrhea.  She had some chills without fever.  No chest pain.  She is on chronic shortness of breath which is at baseline.  She has severe asthma.  She does have a history of pulmonary embolisms, she is currently off blood thinners.  Heart is regular.  Lungs are clear.  Abdomen is soft and nontender.  Moving extremities x 4 without difficulty.  Her NIH stroke score is actually a 0.  There is no appreciable weakness with her leg on exam.  Patient states she feels like she is mentally slower than she normally is.  States she feels like her memory is not as good as it normally would be.  This started today or may be slightly yesterday.    EKG: Normal sinus rhythm with a ventricular rate of 89.  .  QTc 455.  No ST changes.    Repeat EKG: Normal sinus rhythm with a ventricular rate of 81.  .  QTc 446.  No ST changes.    COVID is negative.  Troponin is negative.  BNP is normal.  Chemistry is unremarkable.  Magnesium is normal.  CBC is unremarkable.  CT of the chest shows no pulmonary embolism.  There is hepatic steatosis.  CT of the head has no acute findings.    Case is discussed with neurology and Dr. Stevens did come down to the ER and saw the patient.  Patient is admitted for MRI and further evaluation.  Case is discussed with medicine.            ==========================================        Vitals:    Vitals:    07/23/24 1620 07/23/24 1648 07/23/24 1649 07/23/24 1650   BP: 135/75 142/63 148/63 153/70   BP Location: Right arm Right arm Right arm Right arm   Patient Position: Sitting Lying Sitting Standing   Pulse: 88 86 87 91   Resp: 20 18 18 18   Temp: 36 °C (96.8 °F) 36 °C (96.8 °F)     TempSrc: Temporal Temporal     SpO2: 98% 98%     Weight:       Height:         Rema Silver is a female 49 y.o. who presents to the ER for  syncope. On arrival the patients vital signs were: Afebrile, Reguar HR, Normotensive, Regular RR, and Oxygenating well on room air. History obtained from: patient and Daughter.  Given syncope cardiac workup initiated including CT PE and CT head given slowed mentation as well as history of PE.  Per my independent interpretation of labs no acute leukocytosis, leukopenia, thrombocytosis, thrombocytopenia, or anemia, normoglycemic, no acute electrolyte or hepatorenal abnormality, troponins not elevated doubt ACS or myopericarditis, COVID-negative.  Per my independent interpretation of chest xray no pneumothorax,  consolidation, edema, effusions, pneumomediastinum, widened mediastinum, or other obvious acute pathology visualized.    ED Course as of 07/23/24 1825   Tue Jul 23, 2024   1237 CT angio chest for pulmonary embolism  1.  No evidence of pulmonary embolism or acute findings in the thorax.  2. Diffuse hepatic steatosis   [CB]   1237 CT head wo IV contrast  No acute intracranial hemorrhage, mass effect, or CT apparent acute  infarct.   [CB]   1244 Discussed with Dr. Stevens, who agreed to assess the patient at bedside [CB]   1323 D/w neurology, Dr. Stevens, recommended MRI brain w/wo, admit for obs. No migraine cocktail at this time.  [CB]      ED Course User Index  [CB] Horace Martinez, DO         Diagnoses as of 07/23/24 1825   Syncope, unspecified syncope type     External Records Reviewed: I reviewed recent and relevant outside records including inpatient notes, outpatient records    Shared decision making for disposition  Patient and/or patient´s representative was counseled regarding labs, imaging, likely diagnosis. All questions were answered. Recommendation was made   for Admission given the need for further escalation of care to inpatient management. Patient agreed and was admitted in stable condition. Admitting team was notified of any pending labs or imaging to ensure continuity of care.     ED  Medications administered this visit:    Medications   enoxaparin (Lovenox) syringe 40 mg (has no administration in time range)   polyethylene glycol (Glycolax, Miralax) packet 17 g (17 g oral Not Given 7/23/24 1545)   ipratropium-albuteroL (Duo-Neb) 0.5-2.5 mg/3 mL nebulizer solution 3 mL (3 mL nebulization Not Given 7/23/24 1545)   magnesium oxide (Mag-Ox) tablet 400 mg (400 mg oral Given 7/23/24 1653)   montelukast (Singulair) tablet 10 mg (has no administration in time range)   nystatin (Mycostatin) 100,000 unit/mL suspension 500,000 Units (500,000 Units oral Given 7/23/24 1653)   pantoprazole (ProtoNix) EC tablet 80 mg (has no administration in time range)   budesonide (Pulmicort) 0.5 mg/2 mL nebulizer solution 0.5 mg (has no administration in time range)   formoterol (Perforomist) 20 mcg/2 mL nebulizer solution 20 mcg (has no administration in time range)   iohexol (OMNIPaque) 350 mg iodine/mL solution 60 mL (60 mL intravenous Given 7/23/24 1133)   gadoterate meglumine (Dotarem) 0.5 mmol/mL contrast injection 20 mL (20 mL intravenous Given 7/23/24 1633)        Final Impression:   1. Syncope, unspecified syncope type          Please excuse any misspellings or unintended errors related to the Dragon speech recognition software used to dictate this note.    I reviewed the case with the attending ED physician. The attending ED physician agrees with the plan.      Horace Martinez DO  Resident  07/23/24 3971
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
## 2024-07-24 VITALS
HEART RATE: 88 BPM | SYSTOLIC BLOOD PRESSURE: 128 MMHG | OXYGEN SATURATION: 99 % | WEIGHT: 220 LBS | TEMPERATURE: 97.2 F | DIASTOLIC BLOOD PRESSURE: 78 MMHG | RESPIRATION RATE: 20 BRPM | BODY MASS INDEX: 43.19 KG/M2 | HEIGHT: 60 IN

## 2024-07-24 PROBLEM — R55 SYNCOPE AND COLLAPSE: Status: RESOLVED | Noted: 2024-07-23 | Resolved: 2024-07-24

## 2024-07-24 PROCEDURE — 94640 AIRWAY INHALATION TREATMENT: CPT

## 2024-07-24 PROCEDURE — 2500000001 HC RX 250 WO HCPCS SELF ADMINISTERED DRUGS (ALT 637 FOR MEDICARE OP)

## 2024-07-24 PROCEDURE — 2500000004 HC RX 250 GENERAL PHARMACY W/ HCPCS (ALT 636 FOR OP/ED)

## 2024-07-24 PROCEDURE — 99238 HOSP IP/OBS DSCHRG MGMT 30/<: CPT

## 2024-07-24 PROCEDURE — 2500000002 HC RX 250 W HCPCS SELF ADMINISTERED DRUGS (ALT 637 FOR MEDICARE OP, ALT 636 FOR OP/ED)

## 2024-07-24 PROCEDURE — G0378 HOSPITAL OBSERVATION PER HR: HCPCS

## 2024-07-24 PROCEDURE — 2500000002 HC RX 250 W HCPCS SELF ADMINISTERED DRUGS (ALT 637 FOR MEDICARE OP, ALT 636 FOR OP/ED): Performed by: STUDENT IN AN ORGANIZED HEALTH CARE EDUCATION/TRAINING PROGRAM

## 2024-07-24 RX ORDER — DIPHENHYDRAMINE HCL 25 MG
25 CAPSULE ORAL AS NEEDED
Qty: 5 CAPSULE | Refills: 0 | Status: SHIPPED | OUTPATIENT
Start: 2024-07-24 | End: 2024-07-29

## 2024-07-24 RX ORDER — METOCLOPRAMIDE 10 MG/1
10 TABLET ORAL AS NEEDED
Qty: 5 TABLET | Refills: 0 | Status: SHIPPED | OUTPATIENT
Start: 2024-07-24 | End: 2024-07-29

## 2024-07-24 SDOH — SOCIAL STABILITY: SOCIAL INSECURITY: DO YOU FEEL UNSAFE GOING BACK TO THE PLACE WHERE YOU ARE LIVING?: NO

## 2024-07-24 SDOH — SOCIAL STABILITY: SOCIAL INSECURITY: ABUSE: ADULT

## 2024-07-24 SDOH — SOCIAL STABILITY: SOCIAL INSECURITY: HAVE YOU HAD ANY THOUGHTS OF HARMING ANYONE ELSE?: NO

## 2024-07-24 SDOH — SOCIAL STABILITY: SOCIAL INSECURITY: WERE YOU ABLE TO COMPLETE ALL THE BEHAVIORAL HEALTH SCREENINGS?: YES

## 2024-07-24 SDOH — SOCIAL STABILITY: SOCIAL INSECURITY: ARE THERE ANY APPARENT SIGNS OF INJURIES/BEHAVIORS THAT COULD BE RELATED TO ABUSE/NEGLECT?: NO

## 2024-07-24 SDOH — SOCIAL STABILITY: SOCIAL INSECURITY: DO YOU FEEL ANYONE HAS EXPLOITED OR TAKEN ADVANTAGE OF YOU FINANCIALLY OR OF YOUR PERSONAL PROPERTY?: NO

## 2024-07-24 SDOH — SOCIAL STABILITY: SOCIAL INSECURITY: HAVE YOU HAD THOUGHTS OF HARMING ANYONE ELSE?: NO

## 2024-07-24 SDOH — SOCIAL STABILITY: SOCIAL INSECURITY: DOES ANYONE TRY TO KEEP YOU FROM HAVING/CONTACTING OTHER FRIENDS OR DOING THINGS OUTSIDE YOUR HOME?: NO

## 2024-07-24 SDOH — SOCIAL STABILITY: SOCIAL INSECURITY: ARE YOU OR HAVE YOU BEEN THREATENED OR ABUSED PHYSICALLY, EMOTIONALLY, OR SEXUALLY BY ANYONE?: NO

## 2024-07-24 SDOH — SOCIAL STABILITY: SOCIAL INSECURITY: HAS ANYONE EVER THREATENED TO HURT YOUR FAMILY OR YOUR PETS?: NO

## 2024-07-24 ASSESSMENT — COGNITIVE AND FUNCTIONAL STATUS - GENERAL
PATIENT BASELINE BEDBOUND: NO
DAILY ACTIVITIY SCORE: 24
MOBILITY SCORE: 24

## 2024-07-24 ASSESSMENT — ACTIVITIES OF DAILY LIVING (ADL)
FEEDING YOURSELF: INDEPENDENT
TOILETING: INDEPENDENT
PATIENT'S MEMORY ADEQUATE TO SAFELY COMPLETE DAILY ACTIVITIES?: YES
GROOMING: INDEPENDENT
BATHING: INDEPENDENT
DRESSING YOURSELF: INDEPENDENT
WALKS IN HOME: INDEPENDENT
HEARING - LEFT EAR: FUNCTIONAL
HEARING - RIGHT EAR: FUNCTIONAL
LACK_OF_TRANSPORTATION: NO
ADEQUATE_TO_COMPLETE_ADL: YES
JUDGMENT_ADEQUATE_SAFELY_COMPLETE_DAILY_ACTIVITIES: YES

## 2024-07-24 ASSESSMENT — LIFESTYLE VARIABLES
SKIP TO QUESTIONS 9-10: 1
HOW OFTEN DO YOU HAVE 6 OR MORE DRINKS ON ONE OCCASION: NEVER
HOW OFTEN DO YOU HAVE A DRINK CONTAINING ALCOHOL: NEVER
AUDIT-C TOTAL SCORE: 0
HOW MANY STANDARD DRINKS CONTAINING ALCOHOL DO YOU HAVE ON A TYPICAL DAY: PATIENT DOES NOT DRINK
AUDIT-C TOTAL SCORE: 0

## 2024-07-24 ASSESSMENT — PAIN - FUNCTIONAL ASSESSMENT
PAIN_FUNCTIONAL_ASSESSMENT: 0-10
PAIN_FUNCTIONAL_ASSESSMENT: 0-10

## 2024-07-24 ASSESSMENT — PATIENT HEALTH QUESTIONNAIRE - PHQ9
2. FEELING DOWN, DEPRESSED OR HOPELESS: NOT AT ALL
1. LITTLE INTEREST OR PLEASURE IN DOING THINGS: NOT AT ALL
SUM OF ALL RESPONSES TO PHQ9 QUESTIONS 1 & 2: 0

## 2024-07-24 ASSESSMENT — PAIN SCALES - GENERAL
PAINLEVEL_OUTOF10: 2
PAINLEVEL_OUTOF10: 2

## 2024-07-24 ASSESSMENT — PAIN DESCRIPTION - DESCRIPTORS
DESCRIPTORS: ACHING
DESCRIPTORS: ACHING

## 2024-07-24 NOTE — CARE PLAN
Pt seen before discharge this morning.  She was dressed and walking around her room when I entered.  She states relief of her headache after receiving the migraine cocktail last night.  Endorses mild nausea. She was given a script for Reglan and she has Zofran at home if the Reglan runs out. She will also take advil as needed if the headache returns.  She was advised to follow up with Dr. Stevens, Dr. Mccoy, or Dr. Fortino Magana after discharge. Phone numbers were provided. Discussed discharge plan with dr. Stevens. No further needs from neurology; okay for transfer or discharge as per primary team. Please contact if condition changes for re-eval.

## 2024-07-24 NOTE — DISCHARGE SUMMARY
Discharge Diagnosis  Syncope and collapse    Issues Requiring Follow-Up  Syncope and collapse   Migraine with aura    Discharge Meds     Your medication list        START taking these medications        Instructions Last Dose Given Next Dose Due   diphenhydrAMINE 25 mg capsule  Commonly known as: BENADryl      Take 1 capsule (25 mg) by mouth if needed for other (migraine) for up to 5 days.       metoclopramide 10 mg tablet  Commonly known as: Reglan      Take 1 tablet (10 mg) by mouth if needed (nausea) for up to 5 days.              CONTINUE taking these medications        Instructions Last Dose Given Next Dose Due   cetirizine 10 mg tablet  Commonly known as: ZyrTEC           cyanocobalamin 1,000 mcg tablet  Commonly known as: Vitamin B-12           fluticasone propion-salmeteroL 500-50 mcg/dose diskus inhaler  Commonly known as: Advair Diskus      Inhale 1 puff 2 times a day. Rinse mouth with water after use to reduce aftertaste and incidence of candidiasis. Do not swallow.       ipratropium-albuteroL 0.5-2.5 mg/3 mL nebulizer solution  Commonly known as: Duo-Neb      Take 3 mL by nebulization see administration instructions. Every 4-6 hours prn       magnesium oxide 400 mg (241.3 mg magnesium) tablet  Commonly known as: Mag-Ox           montelukast 10 mg tablet  Commonly known as: Singulair      Take 1 tablet (10 mg) by mouth once daily.       nystatin 100,000 unit/mL suspension  Commonly known as: Mycostatin      Take 5 mL (500,000 Units) by mouth 4 times a day.       pantoprazole 40 mg EC tablet  Commonly known as: ProtoNix      Take 1 tablet (40 mg) by mouth 2 times a day.       Spiriva Respimat 2.5 mcg/actuation inhaler  Generic drug: tiotropium      Inhale 2 puffs once daily.              STOP taking these medications      UNABLE TO FIND               ASK your doctor about these medications        Instructions Last Dose Given Next Dose Due   Tezspire SubQ syringe  Generic drug: tezepelumab-ekko      Inject  1.9 mL (210 mg) under the skin every 28 (twenty-eight) days.                 Where to Get Your Medications        These medications were sent to [facility] [address] [place] [id number] [facility]  [address] [facility] [address]      Phone: [phone]   diphenhydrAMINE 25 mg capsule  metoclopramide 10 mg tablet         Test Results Pending At Discharge  Pending Labs       No current pending labs.            Hospital Course  Rema Silver is a 49 y.o. female with PMHx of severe asthma, no intubation Hx, Provoked R knee DVT which turned into PE 4 years ago off AC now, vocal cord dysfunction, and endometriosis who presented to [facility] for syncope after laughing too hard. Patient also complained of headache upon presentation. In the ED, the patient was hemodynamically stable with unremarkable labs. She had no leukocytosis, thrombocytosis, or anemia. Patient had a negative COVID and troponin. EKG showed NSR with a ventricular rate of 89. . QTc 455, No ST changes. CXR showed no active disease in chest. CTA PE showed no PE and diffuse hepatic steatosis. CT head showed no acute abnormalities. Patient's symptoms were most consistent with vasovagal syncope in the setting of laughing with increased intra-abdominal pressure and Valsalva. Neurology was consulted, suspected migraine with aura, and recommended MRI brain to screen for structural brain lesions, infarct, and arterial and venous malformation. The MRI brain resulted negative and the patient was treated with Migraine cocktail- Toradol, Reglan, and benadryl. The next day, patient reported resolution of her headache and reported that she is thinking more clearly and speaking at a normal rate again. Patient will be discharged with benadryl and Reglan for migraine. Advised to follow up with PCP and Pulmonologist.       Pertinent Physical Exam At Time of Discharge  Physical Exam  Constitutional:       General: She is not in acute  distress.     Appearance: Normal appearance.   HENT:      Head: Normocephalic and atraumatic.   Cardiovascular:      Rate and Rhythm: Normal rate and regular rhythm.      Pulses: Normal pulses.      Heart sounds: Normal heart sounds.   Pulmonary:      Effort: Pulmonary effort is normal. No respiratory distress.      Breath sounds: Normal breath sounds. No wheezing.   Abdominal:      General: Abdomen is flat. There is no distension.      Palpations: Abdomen is soft.      Tenderness: There is no abdominal tenderness. There is no guarding or rebound.   Musculoskeletal:      Right lower leg: No edema.      Left lower leg: No edema.   Skin:     General: Skin is warm and dry.   Neurological:      General: No focal deficit present.      Mental Status: She is alert and oriented to person, place, and time.   Psychiatric:         Mood and Affect: Mood normal.         Behavior: Behavior normal.         Outpatient Follow-Up  Future Appointments   Date Time Provider Department Center   7/30/2024 11:30 AM Ebonie Prescott MD [id number] [facility]   8/16/2024  1:30 PM Claire Nicole MD [id number] [facility]   8/22/2024  9:20 AM Mary Hanna MD [id number] [facility]         Yaa Martinez DO  Internal Medicine, PGY1

## 2024-07-24 NOTE — DISCHARGE INSTRUCTIONS
Please follow up with your PCP within 1 week to review hospital course.     Please follow up with your pulmonologist for management of your asthma.     Please take all of your medications as directed.     New Medications:    Diphenhydramine (Benadryl) 25 mg capsule: Take 1 capsule (25 mg) by mouth as needed for migraine.     Metoclopramide (Reglan) 10 mg tablet: Take 1 tablet (10 mg) by mouth as needed for migraine.     If you have any concerning or worsening symptoms such as chest pain, shortness of breath, new onset confusion, loss of sensation, or fever >103F, please seek medical attention.     Thank you for allowing us to participate in your health care.    [facility].

## 2024-07-24 NOTE — CARE PLAN
The patient's goals for the shift include      The clinical goals for the shift include will be free of pain    Pain has been controlled with migraine cocktail this shift.      Problem: Pain - Adult  Goal: Verbalizes/displays adequate comfort level or baseline comfort level  Outcome: Progressing     Problem: Safety - Adult  Goal: Free from fall injury  Outcome: Progressing     Problem: Discharge Planning  Goal: Discharge to home or other facility with appropriate resources  Outcome: Progressing

## 2024-07-30 ENCOUNTER — PROCEDURE VISIT (OUTPATIENT)
Dept: OTOLARYNGOLOGY | Facility: CLINIC | Age: 49
End: 2024-07-30
Payer: COMMERCIAL

## 2024-07-30 VITALS — HEIGHT: 60 IN | WEIGHT: 220.4 LBS | BODY MASS INDEX: 43.27 KG/M2 | TEMPERATURE: 97.5 F

## 2024-07-30 DIAGNOSIS — K21.9 GASTROESOPHAGEAL REFLUX DISEASE, UNSPECIFIED WHETHER ESOPHAGITIS PRESENT: ICD-10-CM

## 2024-07-30 DIAGNOSIS — J37.0 CHRONIC LARYNGITIS: ICD-10-CM

## 2024-07-30 DIAGNOSIS — J38.3 VOCAL CORD DYSFUNCTION: ICD-10-CM

## 2024-07-30 DIAGNOSIS — R49.0 DYSPHONIA: Primary | ICD-10-CM

## 2024-07-30 PROCEDURE — 99215 OFFICE O/P EST HI 40 MIN: CPT | Performed by: OTOLARYNGOLOGY

## 2024-07-30 PROCEDURE — 31579 LARYNGOSCOPY TELESCOPIC: CPT | Performed by: OTOLARYNGOLOGY

## 2024-07-30 ASSESSMENT — PAIN SCALES - GENERAL: PAINLEVEL: 0-NO PAIN

## 2024-07-30 NOTE — PROGRESS NOTES
Patient: Rema Silver   MRN: [id number] YOB: 1975   Sex: female Age: 49 y.o.  Date of Service: 2024       ASSESSMENT AND PLAN  I discussed the findings with Rema Silver and have recommended the followin. Vocal cord dysfunction in setting of patient with severe persistent asthma, history of HH on PPI. Her exam today reveals full abduction on quiet respiration and inspiratory splinting on cued inhalation after phonation, which is consistent with prior exams. Her asthma has been under better control recently. We discussed revisiting respiratory retraining therapy with SLP, but she would like to hold off for now. She has not had a bronchoscopy before. She is also interested in understanding if reflux is contributing to her VCD but does not think she can tolerate a nasal probe  - Schedule for bronchoscopy with steroid injection, esophagoscopy with BRAVO placement in the endosuite. She will wean the PPI 2 weeks prior    2. Dysphonia, bilateral erythema/edema of vocal folds consistent with chronic laryngitis (heavy voice use, cough, prolonged inhaled steroid use), and muscle tension. No obvious candidiasis today. She does have some mild thickened mucosa/dryness in the interarytenoid region that is nonspecific.  - Vocal hygiene discussed  - Oral rinse/gargle after inhaler use, fluconazole as needed  - Consider voice therapy   - Vocal fold steroid injection (decadron) at the the time of bronchoscopy    CHIEF COMPLAINT  Chief Complaint   Patient presents with    vocal chord dysfunction       HISTORY OF PRESENT ILLNESS  Rema Silver is a 49 y.o. female referred by Dr. Danielle antonio. provider found for evaluation of vocal fold dysfunction.  The patient has a history of severe persistent asthma. Her brother in law in Dr. Vivek Maddox.     24  She reports her breathing has been stable for the past 5 months. She is doing Spiriva,  Advair, Zyrtec, double duoneb in the morning and night. She  travels a lot for work. She talks all day long and frequently loses her voice. Denies dysphagia, occasionally things feel swollen. Currently on Protonix 40 BID    24 - initial visit with me  She reports she has had a prolonged asthma flare-up for the past 4 months that has been persistent despite escalating asthma treatment. She notes shortness of breath daily and was recently admitted at the end of January and discharged on a steroid taper but she hasn't felt much significant improvement. She notes she is considering bronchial thermoplasty with Dr. Garrett Correia. During exacerbations she notes that walking is difficult and feels constriction in her chest. She occasionally feels tightness in her throat. Hard to breathe both in and out, but this can vary. She was previously seen by Dr. Collazo 2018, noted to have inspiratory splinting of her vocal folds, and completed respiratory retraining which she reports helped minimally.     On protonix 40mg every day, she was told that she has a hiatal hernia in the past, egd/colonoscopy.    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 Surgical History  She has a past surgical history that includes Esophagogastroduodenoscopy (2014); Other surgical history (2020); Laparoscopy diagnostic / biopsy / aspiration / lysis (2014); Breast surgery (2014);  section, classic (2014); and Umbilical hernia repair (2014).   Social History  She reports that she has never smoked. She has never used smokeless tobacco. She reports current alcohol use. She reports that she does not use drugs. Allergies  Opioids - morphine analogues     Family History  Family History   Problem  Relation Name Age of Onset    HOLLY disease Maternal Grandfather      Newman's esophagus Maternal Grandfather      Asthma Other Aunt         REVIEW OF SYSTEMS  All 10 systems were reviewed and negative except for above.      PHYSICAL EXAM  ENT Physical Exam   GENERAL: Well-nourished and developed, alert and appropriate, no distress, voice [id number]  RESPIRATORY: Breathing quietly, no stridor  HEAD: Normocephalic atraumatic  FACE: Symmetric, no masses or lesions  EYES:  Pupils reactive, sclera clear, external ocular muscles intact, no nystagmus.    EARS:  Pinnae normal. External auditory canals clear and tympanic membranes intact.  NOSE:  No anterior lesions, masses or polyps.  ORAL CAVITY/OROPHARYNX:  Buccal mucosa is moist without lesions or masses, tongue midline and palate elevates symmetrically. Tongue mobility intact.  NECK:  Soft. There is no lymphadenopathy or thyromegaly.    NEUROLOGIC:  Cranial nerves II-XII grossly intact.       Last Recorded Vitals  Temperature 36.4 °C (97.5 °F), height 1.524 m (5'), weight 100 kg (220 lb 6.4 oz), last menstrual period 07/16/2024.    RESULTS    Patient Reported Outcome Measures  N/A    Laboratory, Radiology, and Pathology  I personally reviewed the following results, with the following interpretation:   N/A      PROCEDURES  Flexible Stroboscopy In Clinic    Date/Time: 7/30/2024 12:34 PM    Performed by: Ebonie Prescott MD  Authorized by: Ebonie Prescott MD         Flexible Fiberoptic Laryngoscopy with Stroboscopy    Patient failed a mirror exam due to limitations of equipment and the need for stroboscopy to assess glottic vibration and closure.     PREOPERATIVE DIAGNOSIS: Dysphonia    POSTOPERATIVE DIAGNOSIS: Same    PROCEDURE:  Strobovideolaryngoscopy    ANESTHESIA:  Topical    COMPLICATIONS:  None    SPECIMENS:  None    PROCEDURE IN DETAIL: The patient was seated in an upright position.  The nasal cavity was topically decongested and anesthetized.  The stroboscopic microphone  was held to the neck at the level of the larynx.  The distal chip video laryngoscope was passed through the nasal cavity.  The nasal cavity and nasopharynx were within normal limits except noted below.  The following findings on stroboscopy were noted:      Appearance of pharynx/larynx/Other Structural Lesions: interarytenoid pachydermia   Vocal Fold Mobility               Right VF: mobile, with mild splinting on inspiration               Left VF: mobile, with mild splinting on inspiration   TVF Appearance               Edema/Erythema: mild/moderate               Lesions/vibratory margin irregularities: irregular medial surface without defined masses or lesions, mild vascular ectasia   Glottic Closure Pattern: slit posterior gap with hyperfunction    Vibration:               Phase: symmetric    Periodicity: regular                Amplitude: decrease                Waveform: decrease   Muscle Tension Patterns:  moderate AP   Other abnormal findings: none    The patient tolerated the procedure well.     Max abduction during quite respiratory      Inspiratory splinting after phonation    ----------------------------------------------------------------------  Ebonie Prescott MD, MAEd    [facility]  Department of Otolaryngology - Head and Neck Surgery  [facility], [place]    The total time I spent in care of this patient today (excluding time spent on other billable services) is as follows:    Time Spent  Prep time on day of patient encounter: 10 minutes  Time spent directly with patient, family or caregiver: 20 minutes  Additional Time Spent on Patient Care Activities: 5 minutes  Documentation Time: 10 minutes  Other Time Spent: 0 minutes  Total: 45 minutes

## 2024-08-02 LAB
ATRIAL RATE: 81 BPM
ATRIAL RATE: 89 BPM
P AXIS: 54 DEGREES
P AXIS: 56 DEGREES
P OFFSET: 178 MS
P OFFSET: 196 MS
P ONSET: 124 MS
P ONSET: 142 MS
PR INTERVAL: 162 MS
PR INTERVAL: 162 MS
Q ONSET: 205 MS
Q ONSET: 223 MS
QRS COUNT: 14 BEATS
QRS COUNT: 15 BEATS
QRS DURATION: 78 MS
QRS DURATION: 88 MS
QT INTERVAL: 374 MS
QT INTERVAL: 384 MS
QTC CALCULATION(BAZETT): 446 MS
QTC CALCULATION(BAZETT): 455 MS
QTC FREDERICIA: 424 MS
QTC FREDERICIA: 426 MS
R AXIS: 53 DEGREES
R AXIS: 57 DEGREES
T AXIS: 35 DEGREES
T AXIS: 39 DEGREES
T OFFSET: 397 MS
T OFFSET: 410 MS
VENTRICULAR RATE: 81 BPM
VENTRICULAR RATE: 89 BPM

## 2024-08-11 NOTE — PROGRESS NOTES
Department of Medicine  Division of Pulmonary, Critical Care, and Sleep Medicine  Follow-Up Visit  [facility] - [address], [address]    Physician HPI:  Mrs Silver is a 48-year-old female with past medical history significant for severe persistent asthma who presented to the office today for follow up.  Rema reports severe persistent symptoms over the past few weeks despite recent course of prolonged corticosteroid taper.  She was tested positive for COVID about 3 weeks ago.  Denies high-grade fevers or hypoxemia.  She has frequent cough and severe chest congestion with audible wheezing at times.  Symptoms are not quite improving with nebulized albuterol treatment.  She has been using albuterol more than 4-5 times per day.  She is currently on prednisone 10 mg daily.  She continues to use ICS/LABA/LAMA inhaler and montelukast.    Rema has history of severe persistent asthma for which she was started on Tezspire injections about 1 year ago.  This is her first severe asthma exacerbation since she was started on Tezspire.  She was noted to have mild vocal cord dysfunction on ENT exam several years ago.  Has not seen ENT in the recent past.    ROS: No fevers or chills.  No acute chest pain.  Has nasal and sinus congestion.  Cough with inhalation.  No acute GI symptoms.  No acute skin rash or inflammatory arthritis now.  No orthopnea or lower extremity edema.    Follow up 1/23/2024:  No significant change in respiratory status since last visit last week. Feels tired and fatigued from persistent asthma symptoms and being on high dose of corticosteroids for several weeks now. No recent hx of high grade fevers. Oxygenation is stable on RA.    Follow up 03/14/2024:  Rema presented today for follow-up after recent hospital admission for acute asthma exacerbation.  She is feeling better though she is not back to her baseline yet.  She is on a taper course of prednisone.  She was seen by ENT recently and exam did not  reveal significant VCD.    Follow up 05/21/2024:  Rema reports to feel slightly better after a prolonged persistent asthma that required frequent courses of systemic corticosteroids. ++ fatigue. Cough is intermittent but could still occur in spells at times. On ICS/LABA/LAMA.     Immunization History   Administered Date(s) Administered    Flu vaccine (IIV4), preservative free *Check age/dose* 09/21/2018, 12/05/2022    Influenza, Unspecified 12/04/2009    Influenza, injectable, quadrivalent 10/14/2019, 11/06/2020, 10/25/2021    Influenza, seasonal, injectable 12/04/2009, 10/21/2020, 11/03/2021, 12/05/2022    Moderna SARS-CoV-2 Vaccination 04/27/2021, 05/27/2021, 12/15/2021    Pneumococcal polysaccharide vaccine, 23-valent, age 2 years and older (PNEUMOVAX 23) 10/14/2019    Tdap vaccine, age 7 year and older (BOOSTRIX, ADACEL) 09/21/2018, 09/26/2019       Current Outpatient Medications   Medication Instructions    cetirizine (ZyrTEC) 10 mg tablet Take 1 tablet (10 mg) by mouth once daily.    cyanocobalamin (Vitamin B-12) 1,000 mcg tablet 1 tablet, oral, Daily    diphenhydrAMINE (BENADRYL) 25 mg, oral, As needed    fluticasone propion-salmeteroL (Advair Diskus) 500-50 mcg/dose diskus inhaler 1 puff, inhalation, 2 times daily RT, Rinse mouth with water after use to reduce aftertaste and incidence of candidiasis. Do not swallow.    ipratropium-albuteroL (Duo-Neb) 0.5-2.5 mg/3 mL nebulizer solution 3 mL, nebulization, See admin instructions, Every 4-6 hours prn     magnesium oxide (Mag-Ox) 400 mg (241.3 mg magnesium) tablet 1 tablet, oral, Daily    metoclopramide (REGLAN) 10 mg, oral, As needed    montelukast (SINGULAIR) 10 mg, oral, Daily    pantoprazole (PROTONIX) 40 mg, oral, 2 times daily    Spiriva Respimat 2.5 mcg/actuation inhaler 2 puffs, inhalation, Daily    Tezspire 210 mg, subcutaneous, Every 28 days        Allergies:  Allergies   Allergen Reactions    Opioids - Morphine Analogues Nausea/vomiting           Visit Vitals  /85 (BP Location: Left arm, Patient Position: Sitting, BP Cuff Size: Large adult)   Pulse 100   Temp 36.4 °C (97.5 °F) (Temporal)   Resp 18   Ht 1.524 m (5')   Wt 101 kg (222 lb)   SpO2 98%   BMI 43.36 kg/m²   OB Status Having periods   Smoking Status Never   BSA 2.07 m²        Physical Exam  Vitals and nursing note reviewed.   Constitutional:       General: She is not in acute distress.     Appearance: Normal appearance.   HENT:      Head: Normocephalic and atraumatic.   Eyes:      Conjunctiva/sclera: Conjunctivae normal.   Cardiovascular:      Rate and Rhythm: Normal rate and regular rhythm.   Pulmonary:      Effort: Pulmonary effort is normal. No respiratory distress.      Breath sounds: No stridor. No wheezing or rhonchi.   Musculoskeletal:         General: Normal range of motion.      Cervical back: Normal range of motion and neck supple.   Skin:     General: Skin is warm and dry.      Coloration: Skin is not jaundiced.   Neurological:      General: No focal deficit present.      Mental Status: She is alert and oriented to person, place, and time. Mental status is at baseline.   Psychiatric:         Mood and Affect: Mood normal.         Behavior: Behavior normal.         Judgment: Judgment normal.            Chest Radiograph     XR chest 1 view 08/13/2023    Narrative  Interpreted By:  KIRSTIN BIRMINGHAM MD  MRN: [id number]  Patient Name: SUJIT PEREA    STUDY:  CHEST 1 VIEW;  8/13/2023 3:58 pm    INDICATION:  Chest Pain .    COMPARISON:  March 25, 2023 chest CT and chest radiograph    ACCESSION NUMBER(S):  [id number]    ORDERING CLINICIAN:  HUGH FRANCISCO    FINDINGS:  AP radiograph of the chest was provided.        CARDIOMEDIASTINAL SILHOUETTE:  Cardiomediastinal silhouette is normal in size and configuration.    LUNGS:  Lungs are clear.    ABDOMEN:  No remarkable upper abdominal findings.    BONES:  No acute osseous changes.    Impression  1.  No evidence of acute cardiopulmonary  process.        MACRO:  None      XR chest 1 view     Narrative  Interpreted By:  NO MARTINEZ MD  MRN: [id number]  Patient Name: SUJIT PEREA    STUDY:  CHEST 1 VIEW;  3/25/2023 11:08 am    INDICATION:  Chest Pain .    COMPARISON:  12/18/2022    ACCESSION NUMBER(S):  [id number]    ORDERING CLINICIAN:  MARY GROSSMAN    FINDINGS:  The heart is not enlarged. No infiltrate, pleural effusion or  pneumothorax is seen.    Impression  No active cardiopulmonary disease.      Echocardiogram     No results found for this or any previous visit from the past 365 days.       Chest CT Scan     CT angio chest for pulmonary embolism 08/13/2023    Narrative  Interpreted By:  EDEN CATALAN MD  MRN: [id number]  Patient Name: SUJIT PEREA    STUDY:  CT ANGIO CHEST FOR PE;  8/13/2023 5:36 pm    INDICATION:  chest pain, SOB, lower extremity swelling .    COMPARISON:  None.    ACCESSION NUMBER(S):  [id number]    ORDERING CLINICIAN:  JAMEY PIERCE    TECHNIQUE:  Contiguous axial images of the chest were obtained after the  intravenous administration of  60 mL Omnipaque 350. Coronal and  sagittal reformatted images were obtained from the axial images. MIPS  of the chest were also performed and reviewed and from the findings  of the examination.    FINDINGS:  No axillary, mediastinal, or hilar lymphadenopathy.    The heart is normal in size. No significant pericardial effusion. No  evidence of acute central, main, lobar or proximal segmental  pulmonary embolism.    Mild bilateral subsegmental atelectasis. No significant pleural  effusion. No pneumothorax.    Limited evaluation of the upper abdomen.  Hepatic steatosis.    Multilevel degenerative change of the thoracic spine.    Impression  No evidence of acute pulmonary embolism.    No evidence of pneumonia.      CT angio chest for pulmonary embolism     Narrative  Interpreted By:  KIRSTIN BIRMINGHAM MD  MRN: [id number]  Patient Name: SUJIT PEREA    STUDY:  CT ANGIO CHEST FOR PE;   3/25/2023 1:29 pm    INDICATION:  chest pain .    COMPARISON:    Nancy 3, 2018 chest CT, January 10, 2021, March 15, 2021 and August 24, 2022 CT pulmonary angiograms.    ACCESSION NUMBER(S):  [id number]    ORDERING CLINICIAN:  MARY GROSSMAN    TECHNIQUE:  Helical data acquisition of the chest was obtained after intravenous  administration of 60 milliliterOMNIPAQUE 350, as per PE protocol.  Images were reformatted in coronal and sagittal planes. Axial and  coronal maximum intensity projection (MIP) images were created and  reviewed.    FINDINGS:  POTENTIAL LIMITATIONS OF THE STUDY: Likely technically adequate  although image degradation related to motion, and streak artifact  contributing to image heterogeneity    HEART AND VESSELS:  Pulmonary arterial heterogeneity including bandlike hypoenhancement  left lower lobe branches centrally series 402, image 109 most  compatible with. No evident definite pulmonary embolism identified.  Main pulmonary artery and its branches are normal in caliber.    The thoracic aorta normal in course and caliber.Although, the study  is not tailored for evaluation of aorta, there is no definite  evidence of acute aortic pathology.  No coronary artery calcifications are seen. Please note, the study is  not optimized for evaluation of coronary arteries.    The cardiac chambers are not enlarged.There are no findings to  suggest right heart strain.    There is no pericardial effusion seen.    MEDIASTINUM AND JOAN, LOWER NECK AND AXILLA:  The visualized thyroid gland is within normal limits.  No evidence of thoracic lymphadenopathy by CT criteria.  Esophagus appears within normal limits as seen.    LUNGS AND AIRWAYS:  The trachea and central airways are patent. No endobronchial lesion  is seen.    Minor increased lung heterogeneity with curvilinear ground-glass  changes at the extreme bases compatible with atelectasis. No evident  consolidative pneumonia, edema, or effusion.      UPPER  ABDOMEN:  The visualized subdiaphragmatic structures demonstrate no remarkable  findings.        CHEST WALL AND OSSEOUS STRUCTURES:  Chest wall is within normal limits.  No acute osseous pathology.There are no suspicious osseous  lesions.Scattered degenerative changes involving the spine appears  similar.    Impression  1. No evidence of acute pulmonary embolism.  2. Minor increased subsegmental atelectasis.       Laboratory Studies     Lab Results   Component Value Date    WBC 8.6 07/23/2024    HGB 12.7 07/23/2024    HCT 38.2 07/23/2024    MCV 91 07/23/2024     07/23/2024      Lab Results   Component Value Date    GLUCOSE 101 (H) 07/23/2024    CALCIUM 9.4 07/23/2024     07/23/2024    K 3.8 07/23/2024    CO2 28 07/23/2024     07/23/2024    BUN 16 07/23/2024    CREATININE 0.72 07/23/2024      Lab Results   Component Value Date    ALT 65 (H) 07/23/2024    AST 33 07/23/2024    ALKPHOS 51 07/23/2024    BILITOT 0.5 07/23/2024        Protime   Date/Time Value Ref Range Status   07/23/2024 09:57 AM 11.0 9.8 - 12.8 seconds Final     INR   Date/Time Value Ref Range Status   07/23/2024 09:57 AM 1.0 0.9 - 1.1 Final       Immunocap IgE   Date/Time Value Ref Range Status   03/19/2022 12:51 PM 18.3 0.0 - 214.0 KU/L Final     Comment:      Note:  Omalizumab (Xolair, [employer]; humanized    IgG1 antihuman IgE Fc) treatment does not    significantly interfere with the accuracy of    total IgE on the ImmunoCAP (Linio) platform.    J Allergy Clin Immunol 2006;117:759-66).   Allergens, parasitic diseases, smoking, and   alcohol consumption have been reported to   increase levels of total IgE in serum.       Aspergillus Fumigatus IgE   Date/Time Value Ref Range Status   03/19/2022 12:51 PM <0.10 <0.35 KU/L Final     Comment:       SEE IMMUNOCAP INTERP.IGE      Total IgG   Date/Time Value Ref Range Status   01/03/2023 10:32  700 - 1,600 mg/dL Final     Comment:     MONOCLONAL PROTEINS MAY CAUSE FALSELY  LOW  RESULTS IN THIS ASSAY. SERUM PROTEIN  ELECTROPHORESIS SHOULD BE DONE AS THE  FIRST TEST TO EVALUATE MONOCLONAL GAMMOPATHY.       IgA   Date/Time Value Ref Range Status   01/03/2023 10:32  70 - 400 mg/dL Final     Comment:     MONOCLONAL PROTEINS MAY CAUSE FALSELY LOW  RESULTS IN THIS ASSAY. SERUM PROTEIN  ELECTROPHORESIS SHOULD BE DONE AS THE  FIRST TEST TO EVALUATE MONOCLONAL GAMMOPATHY.       IgM   Date/Time Value Ref Range Status   01/03/2023 10:32 AM 67 40 - 230 mg/dL Final     Comment:     MONOCLONAL PROTEINS MAY CAUSE FALSELY LOW  RESULTS IN THIS ASSAY. SERUM PROTEIN  ELECTROPHORESIS SHOULD BE DONE AS THE  FIRST TEST TO EVALUATE MONOCLONAL GAMMOPATHY.             Assessment and Plan / Recommendations     Severe persistent asthma  GERD  History of vocal cord dysfunction    CT chest 1/27/2024: no evidence of parenchymal disease or bronchiectasis.  ENT exam 2/27 is reviewed today.     Plan:  Continue on ICS/LABA/LAMA + Montelukast.  Continue on PPI for GERD  Can use albuterol nebs every 4 hours as needed  Follow up with ENT  Will consult Interventional pulmonary for potential benefit from bronchial thermoplasty in the near future  given her severe persistent symptoms despite maximized inhaler regimen and anti TSLP [employer].    Mary Hanna MD

## 2024-08-15 PROBLEM — K21.00 GASTROESOPHAGEAL REFLUX DISEASE WITH ESOPHAGITIS: Status: RESOLVED | Noted: 2023-10-16 | Resolved: 2024-08-15

## 2024-08-15 PROBLEM — E66.3 OVERWEIGHT: Status: RESOLVED | Noted: 2023-10-16 | Resolved: 2024-08-15

## 2024-08-15 PROBLEM — K57.30 SIGMOID DIVERTICULOSIS: Status: RESOLVED | Noted: 2023-10-16 | Resolved: 2024-08-15

## 2024-08-15 PROBLEM — J01.11 ACUTE RECURRENT FRONTAL SINUSITIS: Status: RESOLVED | Noted: 2023-12-18 | Resolved: 2024-08-15

## 2024-08-15 PROBLEM — H66.92 OTITIS MEDIA OF LEFT EAR: Status: RESOLVED | Noted: 2023-10-16 | Resolved: 2024-08-15

## 2024-08-15 PROBLEM — J06.9: Status: RESOLVED | Noted: 2023-10-16 | Resolved: 2024-08-15

## 2024-08-16 ENCOUNTER — APPOINTMENT (OUTPATIENT)
Dept: PRIMARY CARE | Facility: CLINIC | Age: 49
End: 2024-08-16
Payer: COMMERCIAL

## 2024-08-16 VITALS
SYSTOLIC BLOOD PRESSURE: 132 MMHG | BODY MASS INDEX: 41.91 KG/M2 | WEIGHT: 222 LBS | TEMPERATURE: 97.7 F | HEART RATE: 95 BPM | HEIGHT: 61 IN | DIASTOLIC BLOOD PRESSURE: 80 MMHG

## 2024-08-16 DIAGNOSIS — E55.9 VITAMIN D DEFICIENCY: ICD-10-CM

## 2024-08-16 DIAGNOSIS — K76.0 FATTY LIVER: ICD-10-CM

## 2024-08-16 DIAGNOSIS — R73.9 HYPERGLYCEMIA: ICD-10-CM

## 2024-08-16 DIAGNOSIS — Z12.31 ENCOUNTER FOR SCREENING MAMMOGRAM FOR BREAST CANCER: Primary | ICD-10-CM

## 2024-08-16 DIAGNOSIS — R51.9 INTRACTABLE HEADACHE, UNSPECIFIED CHRONICITY PATTERN, UNSPECIFIED HEADACHE TYPE: ICD-10-CM

## 2024-08-16 DIAGNOSIS — Z13.220 SCREENING, LIPID: ICD-10-CM

## 2024-08-16 RX ORDER — ALBUTEROL SULFATE 0.83 MG/ML
SOLUTION RESPIRATORY (INHALATION)
COMMUNITY
Start: 2023-04-24 | End: 2024-08-16 | Stop reason: WASHOUT

## 2024-08-16 RX ORDER — TOPIRAMATE 25 MG/1
TABLET ORAL
Qty: 60 TABLET | Refills: 2 | Status: SHIPPED | OUTPATIENT
Start: 2024-08-16

## 2024-08-16 RX ORDER — RIZATRIPTAN BENZOATE 10 MG/1
10 TABLET ORAL ONCE AS NEEDED
Qty: 30 TABLET | Refills: 3 | Status: SHIPPED | OUTPATIENT
Start: 2024-08-16 | End: 2024-12-14

## 2024-08-16 RX ORDER — ONDANSETRON HYDROCHLORIDE 8 MG/1
8 TABLET, FILM COATED ORAL EVERY 8 HOURS PRN
Qty: 30 TABLET | Refills: 1 | Status: SHIPPED | OUTPATIENT
Start: 2024-08-16

## 2024-08-16 ASSESSMENT — PATIENT HEALTH QUESTIONNAIRE - PHQ9
1. LITTLE INTEREST OR PLEASURE IN DOING THINGS: NOT AT ALL
2. FEELING DOWN, DEPRESSED OR HOPELESS: NOT AT ALL
SUM OF ALL RESPONSES TO PHQ9 QUESTIONS 1 AND 2: 0

## 2024-08-16 NOTE — PROGRESS NOTES
Standard Progress Note  Chief Complaint   Patient presents with    Annual Exam     Awv, pap smear, mammogram referral, labs      New Patient Visit    passed out     Passed out in car, vision issues, memory loss, issues with speech - ER visit, did stroke work up -   Pt still having issues         Headache     Weeks on end     Results     Dx with fatty liver disease, concerns      Edema     Lymphedema      Presents to establish care.  Recently diagnosed with fatty liver  [facility]-diagnosed with lymphadema     Recent admission at [facility].  She indicates on 2024.  Thought to be a headache.  She does take Excedrin Migraine every 4 Aleve's as needed.  She does not feel that she is back to baseline.  She still feels a tingling across her forehead.  In general sense of fogginess.  Trouble thinking of words.  She indicates that she normally she is a fast talker.  The back of her neck basically from her lower scalp her neck and upper thoracic feels like it is being stretched.    She reports that her asthma is very severe and life-threatening.  She sees a pulmonologist at [facility].  She states that she almost  several times    HPI:  Rema Silver 49 y.o.   female    Patient Active Problem List   Diagnosis    Abdominal pain, epigastric    Abnormality, skin    Arthritis    Tendonitis    Allergic rhinitis    Ankle sprain    Asthma ([facility])    Sleep apnea    Bacterial vaginitis    Chronic constipation    Diarrhea    Change in bowel habits    Calcaneal spur of left foot    Calcaneal spur    Dyspepsia    Diverticulitis of colon    Chronic fatigue syndrome    Insomnia    Hypoglycemia    Hyperlipidemia    Hyperglycemia    Hiatal hernia    GERD (gastroesophageal reflux disease)    Fibromyalgia    Muscle tension dysphonia    Vocal cord disease    Lesion of neck    Irritable larynx    Severe persistent asthma without complication (Multi)    Pain of left heel    Vasovagal symptom    Venous  "insufficiency (chronic) (peripheral)    Vitamin D deficiency    Vocal cord dysfunction    Atypical chest pain    Lower extremity edema    Feeling weak    Heel spur, right    Shortness of breath    Peripheral eosinophilia    Severe persistent asthma with exacerbation (Multi)             Blood pressure 132/80, pulse 95, temperature 36.5 °C (97.7 °F), height 1.549 m (5' 1\"), weight 101 kg (222 lb), last menstrual period 07/16/2024.  Body mass index is 41.95 kg/m².        General: NAD. Alert.   HEENT: Normocephalic atraumatic.    Eyes: no scleral icterus. Extraocular movements intact.  Pupils equal round and reactive to light.  Ears: TM intact.  No cerumen. Hearing grossly intact.   Throat: No exudate  Neck:  Supple. No thyroid goiter.  Lymph nodes: No cervical or clavicular adenopathy  Cardiovascular: Regular rate rhythm S1-S2 normal no murmur. No carotid bruit.   Lungs: Clear to Auscultation without wheezing, rales, or rhonchi, Breath sounds symmetric. No use of accessory muscles.  Able to speak in full sentences  Abdomen:  Normoactive bowel sounds, soft, non-tender.  Limited exam   extremities: Wrists are  small compared to her forearm  Neuro: no facial asymmetry. Strength upper and lower extremities 5/5. Sensation intact to light touch. No tremor. Babinski negative  Skin: no rash noted  Vascular:  No cyanosis            Lab Results   Component Value Date    GLUCOSE 101 (H) 07/23/2024    CALCIUM 9.4 07/23/2024     07/23/2024    K 3.8 07/23/2024    CO2 28 07/23/2024     07/23/2024    BUN 16 07/23/2024    CREATININE 0.72 07/23/2024      Lab Results   Component Value Date    WBC 8.6 07/23/2024    HGB 12.7 07/23/2024    HCT 38.2 07/23/2024    MCV 91 07/23/2024     07/23/2024      No results found for: \"CHOL\"  No results found for: \"HDL\"  No results found for: \"LDLCALC\"  No results found for: \"TRIG\"  No components found for: \"CHOLHDL\"    07/2024 CT angio: no PE. Diffuse hepatic steatosis.   07/2024 CT " head without contrast: no acute  7/2024 MRI brain with and without contrast: no acute infarct, mass, or midline shift      1. Encounter for screening mammogram for breast cancer  - BI mammo bilateral screening tomosynthesis; Future    2. Hyperglycemia  - Hemoglobin A1C; Future    3. Vitamin D deficiency  - Vitamin D 25-Hydroxy,Total (for eval of Vitamin D levels); Future    4. Screening, lipid  - Lipid Panel; Future    5. Fatty liver  Discussed GI for fibroscan. Weight loss.   - Referral to Gastroenterology; Future    6. Intractable headache, unspecified chronicity pattern, unspecified headache type  Unclear if migraine  Did see neurologist during admission-Dr Lisa Stevens  Advised to limit prn medication to maximum of 3 days per week to prevent rebound.  Given lingering and atypica symptoms advised to see neurologist as outpatient.  Will try topamax for prophylaxis. Maxalt and zofran prn. Also ok to use exedrin migraine  Headache calendar  Follow up 1 month to evaluate response.   - Referral to Neurology; Future  - topiramate (Topamax) 25 mg tablet; 1 po at bedtime for 3 nights then increase to bide.  Dispense: 60 tablet; Refill: 2  - ondansetron (Zofran) 8 mg tablet; Take 1 tablet (8 mg) by mouth every 8 hours if needed for nausea (headache).  Dispense: 30 tablet; Refill: 1  - rizatriptan (Maxalt) 10 mg tablet; Take 1 tablet (10 mg) by mouth 1 time if needed for migraine. May repeat in 2 hours if unresolved. Do not exceed 30 mg in 24 hours.  Dispense: 30 tablet; Refill: 3    Mammogram ordered  Pap 9/23/2020-reviewed report including hpv testing-normal. Repeat due 5 years  Colonoscopy 9/2021 Tubular adenoma Dr Powell.     Current Outpatient Medications on File Prior to Visit   Medication Sig Dispense Refill    cetirizine (ZyrTEC) 10 mg tablet Take 1 tablet (10 mg) by mouth once daily.      cyanocobalamin (Vitamin B-12) 1,000 mcg tablet Take 1 tablet (1,000 mcg) by mouth once daily.      fluticasone  propion-salmeteroL (Advair Diskus) 500-50 mcg/dose diskus inhaler Inhale 1 puff 2 times a day. Rinse mouth with water after use to reduce aftertaste and incidence of candidiasis. Do not swallow. 60 each 11    ipratropium-albuteroL (Duo-Neb) 0.5-2.5 mg/3 mL nebulizer solution Take 3 mL by nebulization see administration instructions. Every 4-6 hours prn (Patient taking differently: Take 6 mL by nebulization 4 times a day. Every 4-6 hours prn) 360 mL 6    magnesium oxide 500 mg magnesium tablet Take 1 tablet (500 mg) by mouth once daily. 2 tabs PRN      montelukast (Singulair) 10 mg tablet Take 1 tablet (10 mg) by mouth once daily. 90 tablet 3    pantoprazole (ProtoNix) 40 mg EC tablet Take 1 tablet (40 mg) by mouth 2 times a day. (Patient taking differently: Take 2 tablets (80 mg) by mouth once daily in the morning. Take before meals.) 60 tablet 5    Spiriva Respimat 2.5 mcg/actuation inhaler Inhale 2 puffs once daily. 1 each 11    albuterol 2.5 mg /3 mL (0.083 %) nebulizer solution Inhale.      diphenhydrAMINE (BENADryl) 25 mg capsule Take 1 capsule (25 mg) by mouth if needed for other (migraine) for up to 5 days. 5 capsule 0    metoclopramide (Reglan) 10 mg tablet Take 1 tablet (10 mg) by mouth if needed (nausea) for up to 5 days. 5 tablet 0    [DISCONTINUED] tezepelumab-ekko (Tezspire) SubQ syringe Inject 1.9 mL (210 mg) under the skin every 28 (twenty-eight) days. (Patient not taking: Reported on 5/21/2024) 1.9 mL 11     No current facility-administered medications on file prior to visit.         Claire Duron MD

## 2024-08-22 ENCOUNTER — APPOINTMENT (OUTPATIENT)
Dept: PULMONOLOGY | Facility: CLINIC | Age: 49
End: 2024-08-22
Payer: COMMERCIAL

## 2024-09-07 ENCOUNTER — HOSPITAL ENCOUNTER (INPATIENT)
Facility: HOSPITAL | Age: 49
LOS: 1 days | Discharge: HOME | End: 2024-09-08
Attending: EMERGENCY MEDICINE | Admitting: STUDENT IN AN ORGANIZED HEALTH CARE EDUCATION/TRAINING PROGRAM
Payer: COMMERCIAL

## 2024-09-07 ENCOUNTER — APPOINTMENT (OUTPATIENT)
Dept: CARDIOLOGY | Facility: HOSPITAL | Age: 49
DRG: 872 | End: 2024-09-07
Payer: COMMERCIAL

## 2024-09-07 ENCOUNTER — APPOINTMENT (OUTPATIENT)
Dept: RADIOLOGY | Facility: HOSPITAL | Age: 49
DRG: 872 | End: 2024-09-07
Payer: COMMERCIAL

## 2024-09-07 DIAGNOSIS — A41.9 SEPSIS WITHOUT ACUTE ORGAN DYSFUNCTION, DUE TO UNSPECIFIED ORGANISM (MULTI): ICD-10-CM

## 2024-09-07 DIAGNOSIS — K57.92 DIVERTICULITIS: Primary | ICD-10-CM

## 2024-09-07 PROBLEM — R05.9 COUGH, UNSPECIFIED: Status: RESOLVED | Noted: 2022-08-24 | Resolved: 2024-09-07

## 2024-09-07 PROBLEM — I21.4 ACUTE NON-ST ELEVATION MYOCARDIAL INFARCTION (NSTEMI) (MULTI): Status: RESOLVED | Noted: 2018-06-04 | Resolved: 2024-09-07

## 2024-09-07 PROBLEM — U07.1 DISEASE DUE TO SEVERE ACUTE RESPIRATORY SYNDROME CORONAVIRUS 2 (SARS-COV-2): Status: RESOLVED | Noted: 2024-09-07 | Resolved: 2024-09-07

## 2024-09-07 PROBLEM — M19.91 LOCALIZED, PRIMARY OSTEOARTHRITIS: Status: ACTIVE | Noted: 2020-04-10

## 2024-09-07 PROBLEM — I10 ESSENTIAL (PRIMARY) HYPERTENSION: Status: RESOLVED | Noted: 2022-09-10 | Resolved: 2024-09-07

## 2024-09-07 PROBLEM — M72.2 PLANTAR FASCIITIS, LEFT: Status: RESOLVED | Noted: 2018-03-06 | Resolved: 2024-09-07

## 2024-09-07 PROBLEM — R60.9 EDEMA: Status: RESOLVED | Noted: 2024-09-07 | Resolved: 2024-09-07

## 2024-09-07 PROBLEM — R60.0 EDEMA OF BOTH LOWER EXTREMITIES: Status: RESOLVED | Noted: 2024-09-07 | Resolved: 2024-09-07

## 2024-09-07 PROBLEM — Z86.16 PERSONAL HISTORY OF COVID-19: Status: RESOLVED | Noted: 2022-12-21 | Resolved: 2024-09-07

## 2024-09-07 PROBLEM — S93.602A SPRAIN OF LEFT FOOT: Status: RESOLVED | Noted: 2018-04-10 | Resolved: 2024-09-07

## 2024-09-07 PROBLEM — S83.419A SPRAIN OF MEDIAL COLLATERAL LIGAMENT OF KNEE: Status: RESOLVED | Noted: 2020-04-01 | Resolved: 2024-09-07

## 2024-09-07 PROBLEM — Z20.822 CONTACT WITH AND (SUSPECTED) EXPOSURE TO COVID-19: Status: RESOLVED | Noted: 2023-08-13 | Resolved: 2024-09-07

## 2024-09-07 PROBLEM — M23.329 DERANGEMENT OF POSTERIOR HORN OF MEDIAL MENISCUS: Status: RESOLVED | Noted: 2020-04-10 | Resolved: 2024-09-07

## 2024-09-07 LAB
ALBUMIN SERPL BCP-MCNC: 4.1 G/DL (ref 3.4–5)
ALP SERPL-CCNC: 61 U/L (ref 33–110)
ALT SERPL W P-5'-P-CCNC: 30 U/L (ref 7–45)
ANION GAP SERPL CALC-SCNC: 13 MMOL/L (ref 10–20)
APPEARANCE UR: CLEAR
AST SERPL W P-5'-P-CCNC: 18 U/L (ref 9–39)
BASOPHILS # BLD AUTO: 0.05 X10*3/UL (ref 0–0.1)
BASOPHILS NFR BLD AUTO: 0.3 %
BILIRUB SERPL-MCNC: 0.4 MG/DL (ref 0–1.2)
BILIRUB UR STRIP.AUTO-MCNC: NEGATIVE MG/DL
BUN SERPL-MCNC: 12 MG/DL (ref 6–23)
CALCIUM SERPL-MCNC: 9.5 MG/DL (ref 8.6–10.3)
CHLORIDE SERPL-SCNC: 103 MMOL/L (ref 98–107)
CO2 SERPL-SCNC: 22 MMOL/L (ref 21–32)
COLOR UR: COLORLESS
CREAT SERPL-MCNC: 0.74 MG/DL (ref 0.5–1.05)
EGFRCR SERPLBLD CKD-EPI 2021: >90 ML/MIN/1.73M*2
EOSINOPHIL # BLD AUTO: 0.2 X10*3/UL (ref 0–0.7)
EOSINOPHIL NFR BLD AUTO: 1.3 %
ERYTHROCYTE [DISTWIDTH] IN BLOOD BY AUTOMATED COUNT: 13.3 % (ref 11.5–14.5)
FLUAV RNA RESP QL NAA+PROBE: NOT DETECTED
FLUBV RNA RESP QL NAA+PROBE: NOT DETECTED
GLUCOSE SERPL-MCNC: 120 MG/DL (ref 74–99)
GLUCOSE UR STRIP.AUTO-MCNC: NORMAL MG/DL
HCT VFR BLD AUTO: 36.5 % (ref 36–46)
HGB BLD-MCNC: 12.6 G/DL (ref 12–16)
HOLD SPECIMEN: NORMAL
IMM GRANULOCYTES # BLD AUTO: 0.06 X10*3/UL (ref 0–0.7)
IMM GRANULOCYTES NFR BLD AUTO: 0.4 % (ref 0–0.9)
KETONES UR STRIP.AUTO-MCNC: NEGATIVE MG/DL
LACTATE SERPL-SCNC: 1 MMOL/L (ref 0.4–2)
LEUKOCYTE ESTERASE UR QL STRIP.AUTO: NEGATIVE
LIPASE SERPL-CCNC: 15 U/L (ref 9–82)
LYMPHOCYTES # BLD AUTO: 2.79 X10*3/UL (ref 1.2–4.8)
LYMPHOCYTES NFR BLD AUTO: 18.5 %
MCH RBC QN AUTO: 31 PG (ref 26–34)
MCHC RBC AUTO-ENTMCNC: 34.5 G/DL (ref 32–36)
MCV RBC AUTO: 90 FL (ref 80–100)
MONOCYTES # BLD AUTO: 1.01 X10*3/UL (ref 0.1–1)
MONOCYTES NFR BLD AUTO: 6.7 %
NEUTROPHILS # BLD AUTO: 10.98 X10*3/UL (ref 1.2–7.7)
NEUTROPHILS NFR BLD AUTO: 72.8 %
NITRITE UR QL STRIP.AUTO: NEGATIVE
NRBC BLD-RTO: 0 /100 WBCS (ref 0–0)
PH UR STRIP.AUTO: 7.5 [PH]
PLATELET # BLD AUTO: 286 X10*3/UL (ref 150–450)
POTASSIUM SERPL-SCNC: 3.4 MMOL/L (ref 3.5–5.3)
PROT SERPL-MCNC: 6.9 G/DL (ref 6.4–8.2)
PROT UR STRIP.AUTO-MCNC: ABNORMAL MG/DL
RBC # BLD AUTO: 4.07 X10*6/UL (ref 4–5.2)
RBC # UR STRIP.AUTO: ABNORMAL /UL
RBC #/AREA URNS AUTO: >20 /HPF
SARS-COV-2 RNA RESP QL NAA+PROBE: NOT DETECTED
SODIUM SERPL-SCNC: 135 MMOL/L (ref 136–145)
SP GR UR STRIP.AUTO: 1.02
SQUAMOUS #/AREA URNS AUTO: ABNORMAL /HPF
UROBILINOGEN UR STRIP.AUTO-MCNC: NORMAL MG/DL
WBC # BLD AUTO: 15.1 X10*3/UL (ref 4.4–11.3)
WBC #/AREA URNS AUTO: ABNORMAL /HPF

## 2024-09-07 PROCEDURE — G0378 HOSPITAL OBSERVATION PER HR: HCPCS

## 2024-09-07 PROCEDURE — 99222 1ST HOSP IP/OBS MODERATE 55: CPT

## 2024-09-07 PROCEDURE — 80053 COMPREHEN METABOLIC PANEL: CPT

## 2024-09-07 PROCEDURE — 96376 TX/PRO/DX INJ SAME DRUG ADON: CPT

## 2024-09-07 PROCEDURE — 2500000004 HC RX 250 GENERAL PHARMACY W/ HCPCS (ALT 636 FOR OP/ED)

## 2024-09-07 PROCEDURE — 96365 THER/PROPH/DIAG IV INF INIT: CPT

## 2024-09-07 PROCEDURE — 74177 CT ABD & PELVIS W/CONTRAST: CPT

## 2024-09-07 PROCEDURE — 83605 ASSAY OF LACTIC ACID: CPT

## 2024-09-07 PROCEDURE — 81001 URINALYSIS AUTO W/SCOPE: CPT

## 2024-09-07 PROCEDURE — 87040 BLOOD CULTURE FOR BACTERIA: CPT | Mod: STJLAB

## 2024-09-07 PROCEDURE — 1100000001 HC PRIVATE ROOM DAILY

## 2024-09-07 PROCEDURE — 36415 COLL VENOUS BLD VENIPUNCTURE: CPT

## 2024-09-07 PROCEDURE — 2500000001 HC RX 250 WO HCPCS SELF ADMINISTERED DRUGS (ALT 637 FOR MEDICARE OP)

## 2024-09-07 PROCEDURE — 74177 CT ABD & PELVIS W/CONTRAST: CPT | Performed by: RADIOLOGY

## 2024-09-07 PROCEDURE — 93005 ELECTROCARDIOGRAM TRACING: CPT

## 2024-09-07 PROCEDURE — 99285 EMERGENCY DEPT VISIT HI MDM: CPT | Performed by: EMERGENCY MEDICINE

## 2024-09-07 PROCEDURE — 99285 EMERGENCY DEPT VISIT HI MDM: CPT | Mod: 25

## 2024-09-07 PROCEDURE — 83690 ASSAY OF LIPASE: CPT

## 2024-09-07 PROCEDURE — 96366 THER/PROPH/DIAG IV INF ADDON: CPT

## 2024-09-07 PROCEDURE — 96368 THER/DIAG CONCURRENT INF: CPT

## 2024-09-07 PROCEDURE — 96375 TX/PRO/DX INJ NEW DRUG ADDON: CPT

## 2024-09-07 PROCEDURE — 2500000002 HC RX 250 W HCPCS SELF ADMINISTERED DRUGS (ALT 637 FOR MEDICARE OP, ALT 636 FOR OP/ED)

## 2024-09-07 PROCEDURE — 87636 SARSCOV2 & INF A&B AMP PRB: CPT

## 2024-09-07 PROCEDURE — 2550000001 HC RX 255 CONTRASTS: Performed by: EMERGENCY MEDICINE

## 2024-09-07 PROCEDURE — 85025 COMPLETE CBC W/AUTO DIFF WBC: CPT

## 2024-09-07 PROCEDURE — 94640 AIRWAY INHALATION TREATMENT: CPT

## 2024-09-07 PROCEDURE — 96361 HYDRATE IV INFUSION ADD-ON: CPT

## 2024-09-07 RX ORDER — BUDESONIDE 0.5 MG/2ML
0.5 INHALANT ORAL
Status: DISCONTINUED | OUTPATIENT
Start: 2024-09-07 | End: 2024-09-08 | Stop reason: HOSPADM

## 2024-09-07 RX ORDER — PROCHLORPERAZINE MALEATE 10 MG
10 TABLET ORAL ONCE
Status: COMPLETED | OUTPATIENT
Start: 2024-09-07 | End: 2024-09-07

## 2024-09-07 RX ORDER — CETIRIZINE HYDROCHLORIDE 10 MG/1
10 TABLET ORAL DAILY
Status: DISCONTINUED | OUTPATIENT
Start: 2024-09-07 | End: 2024-09-08 | Stop reason: HOSPADM

## 2024-09-07 RX ORDER — ENOXAPARIN SODIUM 100 MG/ML
40 INJECTION SUBCUTANEOUS EVERY 12 HOURS SCHEDULED
Status: DISCONTINUED | OUTPATIENT
Start: 2024-09-07 | End: 2024-09-08 | Stop reason: HOSPADM

## 2024-09-07 RX ORDER — MORPHINE SULFATE 4 MG/ML
4 INJECTION, SOLUTION INTRAMUSCULAR; INTRAVENOUS EVERY 4 HOURS PRN
Status: DISCONTINUED | OUTPATIENT
Start: 2024-09-07 | End: 2024-09-08

## 2024-09-07 RX ORDER — KETOROLAC TROMETHAMINE 15 MG/ML
15 INJECTION, SOLUTION INTRAMUSCULAR; INTRAVENOUS ONCE
Status: COMPLETED | OUTPATIENT
Start: 2024-09-07 | End: 2024-09-07

## 2024-09-07 RX ORDER — LANOLIN ALCOHOL/MO/W.PET/CERES
600 CREAM (GRAM) TOPICAL DAILY
Status: DISCONTINUED | OUTPATIENT
Start: 2024-09-07 | End: 2024-09-08 | Stop reason: HOSPADM

## 2024-09-07 RX ORDER — METOCLOPRAMIDE HYDROCHLORIDE 5 MG/ML
10 INJECTION INTRAMUSCULAR; INTRAVENOUS ONCE
Status: COMPLETED | OUTPATIENT
Start: 2024-09-07 | End: 2024-09-07

## 2024-09-07 RX ORDER — MORPHINE SULFATE 4 MG/ML
4 INJECTION, SOLUTION INTRAMUSCULAR; INTRAVENOUS ONCE
Status: COMPLETED | OUTPATIENT
Start: 2024-09-07 | End: 2024-09-07

## 2024-09-07 RX ORDER — ONDANSETRON HYDROCHLORIDE 2 MG/ML
4 INJECTION, SOLUTION INTRAVENOUS ONCE
Status: COMPLETED | OUTPATIENT
Start: 2024-09-07 | End: 2024-09-07

## 2024-09-07 RX ORDER — FORMOTEROL FUMARATE DIHYDRATE 20 UG/2ML
20 SOLUTION RESPIRATORY (INHALATION)
Status: DISCONTINUED | OUTPATIENT
Start: 2024-09-07 | End: 2024-09-08 | Stop reason: HOSPADM

## 2024-09-07 RX ORDER — ORPHENADRINE CITRATE 100 MG/1
100 TABLET, EXTENDED RELEASE ORAL 2 TIMES DAILY PRN
Status: DISCONTINUED | OUTPATIENT
Start: 2024-09-07 | End: 2024-09-08 | Stop reason: HOSPADM

## 2024-09-07 RX ORDER — LANOLIN ALCOHOL/MO/W.PET/CERES
1000 CREAM (GRAM) TOPICAL DAILY
Status: DISCONTINUED | OUTPATIENT
Start: 2024-09-07 | End: 2024-09-08 | Stop reason: HOSPADM

## 2024-09-07 RX ORDER — PANTOPRAZOLE SODIUM 40 MG/1
40 TABLET, DELAYED RELEASE ORAL 2 TIMES DAILY
Status: DISCONTINUED | OUTPATIENT
Start: 2024-09-07 | End: 2024-09-08 | Stop reason: HOSPADM

## 2024-09-07 RX ORDER — MAGNESIUM SULFATE HEPTAHYDRATE 40 MG/ML
2 INJECTION, SOLUTION INTRAVENOUS ONCE
Status: COMPLETED | OUTPATIENT
Start: 2024-09-07 | End: 2024-09-07

## 2024-09-07 RX ORDER — POTASSIUM CHLORIDE 14.9 MG/ML
20 INJECTION INTRAVENOUS
Status: COMPLETED | OUTPATIENT
Start: 2024-09-07 | End: 2024-09-07

## 2024-09-07 RX ORDER — ONDANSETRON HYDROCHLORIDE 2 MG/ML
4 INJECTION, SOLUTION INTRAVENOUS EVERY 6 HOURS PRN
Status: DISCONTINUED | OUTPATIENT
Start: 2024-09-07 | End: 2024-09-08 | Stop reason: HOSPADM

## 2024-09-07 RX ORDER — FLUTICASONE PROPIONATE 50 MCG
2 SPRAY, SUSPENSION (ML) NASAL DAILY
Status: DISCONTINUED | OUTPATIENT
Start: 2024-09-07 | End: 2024-09-08 | Stop reason: HOSPADM

## 2024-09-07 RX ORDER — MORPHINE SULFATE 2 MG/ML
2 INJECTION, SOLUTION INTRAMUSCULAR; INTRAVENOUS EVERY 4 HOURS PRN
Status: DISCONTINUED | OUTPATIENT
Start: 2024-09-07 | End: 2024-09-08

## 2024-09-07 RX ORDER — IPRATROPIUM BROMIDE AND ALBUTEROL SULFATE 2.5; .5 MG/3ML; MG/3ML
3 SOLUTION RESPIRATORY (INHALATION)
Status: DISCONTINUED | OUTPATIENT
Start: 2024-09-07 | End: 2024-09-08 | Stop reason: HOSPADM

## 2024-09-07 RX ORDER — TOPIRAMATE 25 MG/1
25 TABLET ORAL 2 TIMES DAILY
Status: DISCONTINUED | OUTPATIENT
Start: 2024-09-07 | End: 2024-09-08 | Stop reason: HOSPADM

## 2024-09-07 RX ORDER — MONTELUKAST SODIUM 10 MG/1
10 TABLET ORAL DAILY
Status: DISCONTINUED | OUTPATIENT
Start: 2024-09-07 | End: 2024-09-08 | Stop reason: HOSPADM

## 2024-09-07 RX ORDER — DIPHENHYDRAMINE HCL 25 MG
25 TABLET ORAL ONCE
Status: COMPLETED | OUTPATIENT
Start: 2024-09-07 | End: 2024-09-07

## 2024-09-07 RX ORDER — FLUTICASONE FUROATE AND VILANTEROL 200; 25 UG/1; UG/1
1 POWDER RESPIRATORY (INHALATION)
Status: DISCONTINUED | OUTPATIENT
Start: 2024-09-07 | End: 2024-09-07 | Stop reason: ALTCHOICE

## 2024-09-07 RX ADMIN — KETOROLAC TROMETHAMINE 15 MG: 15 INJECTION, SOLUTION INTRAMUSCULAR; INTRAVENOUS at 21:42

## 2024-09-07 RX ADMIN — POTASSIUM CHLORIDE 20 MEQ: 14.9 INJECTION, SOLUTION INTRAVENOUS at 12:44

## 2024-09-07 RX ADMIN — Medication 600 MG: at 11:52

## 2024-09-07 RX ADMIN — ONDANSETRON 4 MG: 2 INJECTION INTRAMUSCULAR; INTRAVENOUS at 10:42

## 2024-09-07 RX ADMIN — CETIRIZINE HYDROCHLORIDE 10 MG: 10 TABLET, FILM COATED ORAL at 11:52

## 2024-09-07 RX ADMIN — PROCHLORPERAZINE MALEATE 10 MG: 10 TABLET ORAL at 21:42

## 2024-09-07 RX ADMIN — SODIUM CHLORIDE, POTASSIUM CHLORIDE, SODIUM LACTATE AND CALCIUM CHLORIDE 1000 ML: 600; 310; 30; 20 INJECTION, SOLUTION INTRAVENOUS at 10:39

## 2024-09-07 RX ADMIN — PIPERACILLIN SODIUM AND TAZOBACTAM SODIUM 3.38 G: 3; .375 INJECTION, SOLUTION INTRAVENOUS at 20:11

## 2024-09-07 RX ADMIN — MORPHINE SULFATE 4 MG: 4 INJECTION, SOLUTION INTRAMUSCULAR; INTRAVENOUS at 05:28

## 2024-09-07 RX ADMIN — IPRATROPIUM BROMIDE AND ALBUTEROL SULFATE 3 ML: 2.5; .5 SOLUTION RESPIRATORY (INHALATION) at 15:36

## 2024-09-07 RX ADMIN — PANTOPRAZOLE SODIUM 40 MG: 40 TABLET, DELAYED RELEASE ORAL at 20:08

## 2024-09-07 RX ADMIN — CYANOCOBALAMIN TAB 1000 MCG 1000 MCG: 1000 TAB at 11:52

## 2024-09-07 RX ADMIN — METOCLOPRAMIDE 10 MG: 5 INJECTION, SOLUTION INTRAMUSCULAR; INTRAVENOUS at 13:45

## 2024-09-07 RX ADMIN — IOHEXOL 75 ML: 350 INJECTION, SOLUTION INTRAVENOUS at 06:02

## 2024-09-07 RX ADMIN — FORMOTEROL FUMARATE 20 MCG: 20 SOLUTION RESPIRATORY (INHALATION) at 19:51

## 2024-09-07 RX ADMIN — MONTELUKAST 10 MG: 10 TABLET, FILM COATED ORAL at 11:52

## 2024-09-07 RX ADMIN — TOPIRAMATE 25 MG: 25 TABLET, FILM COATED ORAL at 20:08

## 2024-09-07 RX ADMIN — MORPHINE SULFATE 2 MG: 2 INJECTION, SOLUTION INTRAMUSCULAR; INTRAVENOUS at 10:39

## 2024-09-07 RX ADMIN — PIPERACILLIN SODIUM AND TAZOBACTAM SODIUM 3.38 G: 3; .375 INJECTION, SOLUTION INTRAVENOUS at 12:00

## 2024-09-07 RX ADMIN — POTASSIUM CHLORIDE 20 MEQ: 14.9 INJECTION, SOLUTION INTRAVENOUS at 10:51

## 2024-09-07 RX ADMIN — KETOROLAC TROMETHAMINE 15 MG: 15 INJECTION, SOLUTION INTRAMUSCULAR; INTRAVENOUS at 13:45

## 2024-09-07 RX ADMIN — ONDANSETRON 4 MG: 2 INJECTION INTRAMUSCULAR; INTRAVENOUS at 05:28

## 2024-09-07 RX ADMIN — PIPERACILLIN SODIUM AND TAZOBACTAM SODIUM 3.38 G: 3; .375 INJECTION, SOLUTION INTRAVENOUS at 06:52

## 2024-09-07 RX ADMIN — PANTOPRAZOLE SODIUM 40 MG: 40 TABLET, DELAYED RELEASE ORAL at 11:52

## 2024-09-07 RX ADMIN — DIPHENHYDRAMINE HYDROCHLORIDE 25 MG: 25 TABLET ORAL at 13:45

## 2024-09-07 RX ADMIN — SODIUM CHLORIDE, POTASSIUM CHLORIDE, SODIUM LACTATE AND CALCIUM CHLORIDE 1000 ML: 600; 310; 30; 20 INJECTION, SOLUTION INTRAVENOUS at 05:28

## 2024-09-07 RX ADMIN — IPRATROPIUM BROMIDE AND ALBUTEROL SULFATE 3 ML: 2.5; .5 SOLUTION RESPIRATORY (INHALATION) at 19:52

## 2024-09-07 RX ADMIN — MAGNESIUM SULFATE HEPTAHYDRATE 2 G: 40 INJECTION, SOLUTION INTRAVENOUS at 13:45

## 2024-09-07 RX ADMIN — FLUTICASONE PROPIONATE 2 SPRAY: 50 SPRAY, METERED NASAL at 11:52

## 2024-09-07 RX ADMIN — TOPIRAMATE 25 MG: 25 TABLET, FILM COATED ORAL at 11:52

## 2024-09-07 RX ADMIN — BUDESONIDE 0.5 MG: 0.5 INHALANT RESPIRATORY (INHALATION) at 19:51

## 2024-09-07 SDOH — SOCIAL STABILITY: SOCIAL INSECURITY: DO YOU FEEL ANYONE HAS EXPLOITED OR TAKEN ADVANTAGE OF YOU FINANCIALLY OR OF YOUR PERSONAL PROPERTY?: NO

## 2024-09-07 SDOH — SOCIAL STABILITY: SOCIAL INSECURITY: DOES ANYONE TRY TO KEEP YOU FROM HAVING/CONTACTING OTHER FRIENDS OR DOING THINGS OUTSIDE YOUR HOME?: NO

## 2024-09-07 SDOH — SOCIAL STABILITY: SOCIAL INSECURITY: DO YOU FEEL UNSAFE GOING BACK TO THE PLACE WHERE YOU ARE LIVING?: NO

## 2024-09-07 SDOH — SOCIAL STABILITY: SOCIAL INSECURITY: ARE YOU OR HAVE YOU BEEN THREATENED OR ABUSED PHYSICALLY, EMOTIONALLY, OR SEXUALLY BY ANYONE?: NO

## 2024-09-07 SDOH — SOCIAL STABILITY: SOCIAL INSECURITY: ARE THERE ANY APPARENT SIGNS OF INJURIES/BEHAVIORS THAT COULD BE RELATED TO ABUSE/NEGLECT?: NO

## 2024-09-07 SDOH — SOCIAL STABILITY: SOCIAL INSECURITY: HAVE YOU HAD THOUGHTS OF HARMING ANYONE ELSE?: NO

## 2024-09-07 SDOH — SOCIAL STABILITY: SOCIAL INSECURITY: HAVE YOU HAD ANY THOUGHTS OF HARMING ANYONE ELSE?: NO

## 2024-09-07 SDOH — SOCIAL STABILITY: SOCIAL INSECURITY: WERE YOU ABLE TO COMPLETE ALL THE BEHAVIORAL HEALTH SCREENINGS?: YES

## 2024-09-07 SDOH — SOCIAL STABILITY: SOCIAL INSECURITY: HAS ANYONE EVER THREATENED TO HURT YOUR FAMILY OR YOUR PETS?: NO

## 2024-09-07 SDOH — SOCIAL STABILITY: SOCIAL INSECURITY: ABUSE: ADULT

## 2024-09-07 ASSESSMENT — ENCOUNTER SYMPTOMS
FEVER: 0
VOMITING: 0
APPETITE CHANGE: 1
ACTIVITY CHANGE: 0
NAUSEA: 1
DIARRHEA: 0
DYSURIA: 0
TROUBLE SWALLOWING: 0
SHORTNESS OF BREATH: 0
SINUS PAIN: 1
COUGH: 0
FACIAL SWELLING: 0
RHINORRHEA: 0
CHILLS: 1
SORE THROAT: 0
WEAKNESS: 0
TREMORS: 0
FREQUENCY: 0
SPEECH DIFFICULTY: 1
ABDOMINAL PAIN: 1
HEADACHES: 1
ABDOMINAL DISTENTION: 1
CHEST TIGHTNESS: 0
CONSTIPATION: 0
SINUS PRESSURE: 1
DIZZINESS: 0

## 2024-09-07 ASSESSMENT — PATIENT HEALTH QUESTIONNAIRE - PHQ9
SUM OF ALL RESPONSES TO PHQ9 QUESTIONS 1 & 2: 0
1. LITTLE INTEREST OR PLEASURE IN DOING THINGS: NOT AT ALL
2. FEELING DOWN, DEPRESSED OR HOPELESS: NOT AT ALL

## 2024-09-07 ASSESSMENT — PAIN SCALES - GENERAL
PAINLEVEL_OUTOF10: 7
PAINLEVEL_OUTOF10: 8
PAINLEVEL_OUTOF10: 5 - MODERATE PAIN
PAINLEVEL_OUTOF10: 8
PAINLEVEL_OUTOF10: 7
PAINLEVEL_OUTOF10: 5 - MODERATE PAIN

## 2024-09-07 ASSESSMENT — PAIN DESCRIPTION - LOCATION
LOCATION: ABDOMEN
LOCATION: ABDOMEN

## 2024-09-07 ASSESSMENT — PAIN - FUNCTIONAL ASSESSMENT
PAIN_FUNCTIONAL_ASSESSMENT: 0-10

## 2024-09-07 ASSESSMENT — COGNITIVE AND FUNCTIONAL STATUS - GENERAL
DAILY ACTIVITIY SCORE: 24
PATIENT BASELINE BEDBOUND: NO
DAILY ACTIVITIY SCORE: 24
MOBILITY SCORE: 24
MOBILITY SCORE: 24

## 2024-09-07 ASSESSMENT — LIFESTYLE VARIABLES
HOW MANY STANDARD DRINKS CONTAINING ALCOHOL DO YOU HAVE ON A TYPICAL DAY: PATIENT DOES NOT DRINK
HOW OFTEN DO YOU HAVE A DRINK CONTAINING ALCOHOL: NEVER
AUDIT-C TOTAL SCORE: 0
SKIP TO QUESTIONS 9-10: 1
HOW OFTEN DO YOU HAVE 6 OR MORE DRINKS ON ONE OCCASION: NEVER
AUDIT-C TOTAL SCORE: 0

## 2024-09-07 ASSESSMENT — ACTIVITIES OF DAILY LIVING (ADL)
BATHING: INDEPENDENT
LACK_OF_TRANSPORTATION: NO
HEARING - LEFT EAR: FUNCTIONAL
DRESSING YOURSELF: INDEPENDENT
JUDGMENT_ADEQUATE_SAFELY_COMPLETE_DAILY_ACTIVITIES: YES
WALKS IN HOME: INDEPENDENT
GROOMING: INDEPENDENT
HEARING - RIGHT EAR: FUNCTIONAL
FEEDING YOURSELF: INDEPENDENT
ADEQUATE_TO_COMPLETE_ADL: YES
TOILETING: INDEPENDENT
PATIENT'S MEMORY ADEQUATE TO SAFELY COMPLETE DAILY ACTIVITIES?: YES

## 2024-09-07 ASSESSMENT — COLUMBIA-SUICIDE SEVERITY RATING SCALE - C-SSRS
6. HAVE YOU EVER DONE ANYTHING, STARTED TO DO ANYTHING, OR PREPARED TO DO ANYTHING TO END YOUR LIFE?: NO
2. HAVE YOU ACTUALLY HAD ANY THOUGHTS OF KILLING YOURSELF?: NO
1. IN THE PAST MONTH, HAVE YOU WISHED YOU WERE DEAD OR WISHED YOU COULD GO TO SLEEP AND NOT WAKE UP?: NO

## 2024-09-07 ASSESSMENT — PAIN DESCRIPTION - PAIN TYPE: TYPE: ACUTE PAIN

## 2024-09-07 NOTE — ED PROVIDER NOTES
EMERGENCY DEPARTMENT ENCOUNTER      Pt Name: Rema Silver  MRN: [id number]  Birthdate 1975  Date of evaluation: 2024  Provider: Ilia Tao DO    CHIEF COMPLAINT       Chief Complaint   Patient presents with    Abdominal Pain     Pt reports abd pain for one week    Ear Fullness     Pt states she thinks she may have an ear infection or sinus infection         HISTORY OF PRESENT ILLNESS    49-year-old female, history of hyperlipidemia, hiatal hernia, GERD, fibromyalgia, asthma, comes emergency room abdominal pain, left lower quadrant spreading diffuse abdominal pain.  Left lower quadrant abdominal pain started around Tuesday or Wednesday according to the patient.  Late yesterday, and overnight the pain spread almost diffusely amongst her abdomen, is currently an 8 out of 10 pain, she endorses chills as well.  She has some other symptoms as well including facial pain, headaches, left-sided ear fullness.  No chest pain or shortness of breath, no frequency, urgency, hematuria or dysuria.  History of a , umbilical hernia repair with mesh.      History provided by:  Patient      Nursing Notes were reviewed.    PAST MEDICAL HISTORY     Past Medical History:   Diagnosis Date    Asthma ([facility])     Personal history of other diseases of the respiratory system     History of acute bronchitis         SURGICAL HISTORY       Past Surgical History:   Procedure Laterality Date    BREAST SURGERY  2014    Breast Surgery Reduction Procedure     SECTION, CLASSIC  2014     Section    ESOPHAGOGASTRODUODENOSCOPY  2014    Diagnostic Esophagogastroduodenoscopy    LAPAROSCOPY DIAGNOSTIC / BIOPSY / ASPIRATION / LYSIS  2014    Exploratory Laparoscopy    OTHER SURGICAL HISTORY  2020    Complete colonoscopy    UMBILICAL HERNIA REPAIR  2014    Umbilical Hernia Repair         CURRENT MEDICATIONS       Previous Medications    CETIRIZINE (ZYRTEC) 10 MG TABLET    Take 1 tablet  (10 mg) by mouth once daily.    CYANOCOBALAMIN (VITAMIN B-12) 1,000 MCG TABLET    Take 1 tablet (1,000 mcg) by mouth once daily.    DIPHENHYDRAMINE (BENADRYL) 25 MG CAPSULE    Take 1 capsule (25 mg) by mouth if needed for other (migraine) for up to 5 days.    FLUTICASONE PROPION-SALMETEROL (ADVAIR DISKUS) 500-50 MCG/DOSE DISKUS INHALER    Inhale 1 puff 2 times a day. Rinse mouth with water after use to reduce aftertaste and incidence of candidiasis. Do not swallow.    IPRATROPIUM-ALBUTEROL (DUO-NEB) 0.5-2.5 MG/3 ML NEBULIZER SOLUTION    Take 3 mL by nebulization see administration instructions. Every 4-6 hours prn    MAGNESIUM OXIDE 500 MG MAGNESIUM TABLET    Take 1 tablet (500 mg) by mouth once daily. 2 tabs PRN    METOCLOPRAMIDE (REGLAN) 10 MG TABLET    Take 1 tablet (10 mg) by mouth if needed (nausea) for up to 5 days.    MONTELUKAST (SINGULAIR) 10 MG TABLET    Take 1 tablet (10 mg) by mouth once daily.    ONDANSETRON (ZOFRAN) 8 MG TABLET    Take 1 tablet (8 mg) by mouth every 8 hours if needed for nausea (headache).    PANTOPRAZOLE (PROTONIX) 40 MG EC TABLET    Take 1 tablet (40 mg) by mouth 2 times a day.    RIZATRIPTAN (MAXALT) 10 MG TABLET    Take 1 tablet (10 mg) by mouth 1 time if needed for migraine. May repeat in 2 hours if unresolved. Do not exceed 30 mg in 24 hours.    SPIRIVA RESPIMAT 2.5 MCG/ACTUATION INHALER    Inhale 2 puffs once daily.    TOPIRAMATE (TOPAMAX) 25 MG TABLET    1 po at bedtime for 3 nights then increase to bide.       ALLERGIES     Opioids - morphine analogues    FAMILY HISTORY       Family History   Problem Relation Name Age of Onset    HOLLY disease Maternal Grandfather      Newman's esophagus Maternal Grandfather      Asthma Other Aunt           SOCIAL HISTORY       Social History     Socioeconomic History    Marital status:    Tobacco Use    Smoking status: Never    Smokeless tobacco: Never   Vaping Use    Vaping status: Never Used   Substance and Sexual Activity    Alcohol  use: Yes     Comment: very rarely    Drug use: Never     Social Determinants of Health     Financial Resource Strain: Patient Declined (7/24/2024)    Overall Financial Resource Strain (CARDIA)     Difficulty of Paying Living Expenses: Patient declined   Transportation Needs: No Transportation Needs (7/24/2024)    PRAPARE - Transportation     Lack of Transportation (Medical): No     Lack of Transportation (Non-Medical): No   Housing Stability: Unknown (7/24/2024)    Housing Stability Vital Sign     Unable to Pay for Housing in the Last Year: Patient declined     Number of Times Moved in the Last Year: 1     Homeless in the Last Year: No       SCREENINGS                        PHYSICAL EXAM    (up to 7 for level 4, 8 or more for level 5)     ED Triage Vitals [09/07/24 0450]   Temperature Heart Rate Respirations BP   36.5 °C (97.7 °F) 94 11 169/61      Pulse Ox Temp Source Heart Rate Source Patient Position   98 % Temporal Monitor Sitting      BP Location FiO2 (%)     Right arm --       Physical Exam  Vitals and nursing note reviewed.   Constitutional:       General: She is not in acute distress.  HENT:      Head: Normocephalic and atraumatic.      Right Ear: Tympanic membrane, ear canal and external ear normal.      Left Ear: Tympanic membrane, ear canal and external ear normal.   Eyes:      General: No scleral icterus.        Right eye: No discharge.         Left eye: No discharge.      Conjunctiva/sclera: Conjunctivae normal.   Cardiovascular:      Rate and Rhythm: Normal rate and regular rhythm.      Pulses: Normal pulses.   Pulmonary:      Effort: Pulmonary effort is normal.   Abdominal:      General: Abdomen is flat.      Palpations: Abdomen is soft.      Tenderness: There is generalized abdominal tenderness and tenderness in the left lower quadrant. There is no guarding or rebound.   Musculoskeletal:         General: No deformity.      Right lower leg: No edema.      Left lower leg: No edema.   Skin:      General: Skin is warm and dry.   Neurological:      Mental Status: She is alert and oriented to person, place, and time. Mental status is at baseline.   Psychiatric:         Mood and Affect: Mood normal.         Behavior: Behavior normal.          DIAGNOSTIC RESULTS     LABS:  Labs Reviewed   CBC WITH AUTO DIFFERENTIAL - Abnormal       Result Value    WBC 15.1 (*)     nRBC 0.0      RBC 4.07      Hemoglobin 12.6      Hematocrit 36.5      MCV 90      MCH 31.0      MCHC 34.5      RDW 13.3      Platelets 286      Neutrophils % 72.8      Immature Granulocytes %, Automated 0.4      Lymphocytes % 18.5      Monocytes % 6.7      Eosinophils % 1.3      Basophils % 0.3      Neutrophils Absolute 10.98 (*)     Immature Granulocytes Absolute, Automated 0.06      Lymphocytes Absolute 2.79      Monocytes Absolute 1.01 (*)     Eosinophils Absolute 0.20      Basophils Absolute 0.05     COMPREHENSIVE METABOLIC PANEL - Abnormal    Glucose 120 (*)     Sodium 135 (*)     Potassium 3.4 (*)     Chloride 103      Bicarbonate 22      Anion Gap 13      Urea Nitrogen 12      Creatinine 0.74      eGFR >90      Calcium 9.5      Albumin 4.1      Alkaline Phosphatase 61      Total Protein 6.9      AST 18      Bilirubin, Total 0.4      ALT 30     LIPASE - Normal    Lipase 15      Narrative:     Venipuncture immediately after or during the administration of Metamizole may lead to falsely low results. Testing should be performed immediately prior to Metamizole dosing.   INFLUENZA A AND B PCR - Normal    Flu A Result Not Detected      Flu B Result Not Detected      Narrative:     This assay is an in vitro diagnostic multiplex nucleic acid amplification test for the detection and discrimination of Influenza A & B from nasopharyngeal specimens, and has been validated for use at [facility]. Negative results do not preclude Influenza A/B infections, and should not be used as the sole basis for diagnosis, treatment, or other  management decisions. If Influenza A/B and RSV PCR results are negative, testing for Parainfluenza virus, Adenovirus and Metapneumovirus is routinely performed for [facility] pediatric oncology and intensive care inpatients, and is available on other patients by placing an add-on request.   SARS-COV-2 PCR - Normal    Coronavirus 2019, PCR Not Detected      Narrative:     This assay has received FDA Emergency Use Authorization (EUA) and is only authorized for the duration of time that circumstances exist to justify the authorization of the emergency use of in vitro diagnostic tests for the detection of SARS-CoV-2 virus and/or diagnosis of COVID-19 infection under section 564(b)(1) of the Act, 21 U.S.C. 360bbb-3(b)(1). This assay is an in vitro diagnostic nucleic acid amplification test for the qualitative detection of SARS-CoV-2 from nasopharyngeal specimens and has been validated for use at [facility]. Negative results do not preclude COVID-19 infections and should not be used as the sole basis for diagnosis, treatment, or other management decisions.     LACTATE - Normal    Lactate 1.0      Narrative:     Venipuncture immediately after or during the administration of Metamizole may lead to falsely low results. Testing should be performed immediately  prior to Metamizole dosing.   BLOOD CULTURE   BLOOD CULTURE   URINALYSIS WITH REFLEX CULTURE AND MICROSCOPIC    Narrative:     The following orders were created for panel order Urinalysis with Reflex Culture and Microscopic.  Procedure                               Abnormality         Status                     ---------                               -----------         ------                     Urinalysis with Reflex C...[[id number]]                                                 Extra Urine Gray Tube[[id number]]                                                         Please view results for these tests on the individual orders.   URINALYSIS WITH  REFLEX CULTURE AND MICROSCOPIC   EXTRA URINE GRAY TUBE       All other labs were within normal range or not returned as of this dictation.    Imaging  CT abdomen pelvis w IV contrast    (Results Pending)        Procedures  Procedures     EMERGENCY DEPARTMENT COURSE/MDM:     ED Course as of 09/07/24 0653   Sat Sep 07, 2024   0600 Patient does have a leukocytosis here today, heart rate was greater than 90, making her SIRS positive.  Will defer on antibiotic treatment till I have a verifiable bacterial source to her symptoms.  Reasoning for this is that she is also having headaches, facial pain, there are sufficient suspicion of viral etiology here that I feel it is necessary to hold off on antibiotic therapy until a source is confirmed. [RD]      ED Course User Index  [RD] Ilia Tao,         Medical Decision Making  49-year-old female comes emergency room with left lower quadrant abdominal pain initially which spread to almost diffusely which caused her to come to the ER here today, differential for diverticulitis with complications of perforation or abscess, other intra-abdominal inflammatory pathology such as pancreatitis, appendicitis, colitis, or pyelonephritis as well.    Did order viral testing, lipase, lactic, blood cultures, CBC, chemistry, CT abdomen and pelvis with contrast.  Administered IV morphine 4 mg, IV Zofran 4 mg for pain management, 1 L IV lactated Ringer's as well.    My independent interpretation of labs, CBC showed a leukocytosis of 15.1, chemistry largely unremarkable.  Initially did not treat with antibiotics despite patient being SIRS positive as I was suspicious of possible viral etiology to her symptoms as well given she was having a headache, facial pain during the same time period as the abdominal symptoms.  Viral testing was negative, I did then did cover the patient with a dose of IV Zosyn.    Patient was signed out to oncoming provider pending CT abdomen pelvis with contrast read  and disposition.        Patient and or family in agreement and understanding of treatment plan.  All questions answered.      I reviewed the case with the attending ED physician. The attending ED physician agrees with the plan. Patient and/or patient´s representative was counseled regarding labs, imaging, likely diagnosis, and plan. All questions were answered.    ED Medications administered this visit:    Medications   piperacillin-tazobactam (Zosyn) 3.375 g in dextrose (iso) IV 50 mL (3.375 g intravenous New Bag 9/7/24 0652)   ondansetron (Zofran) injection 4 mg (4 mg intravenous Given 9/7/24 0528)   morphine injection 4 mg (4 mg intravenous Given 9/7/24 0528)   lactated Ringer's bolus 1,000 mL (0 mL intravenous Stopped 9/7/24 0650)   iohexol (OMNIPaque) 350 mg iodine/mL solution 75 mL (75 mL intravenous Given 9/7/24 0602)     (Please note that portions of this note were completed with a voice recognition program.  Efforts were made to edit the dictations but occasionally words are mis-transcribed.)     Ilia Tao DO  Resident  09/07/24 0609

## 2024-09-07 NOTE — H&P
History Of Present Illness  Rema Silver is a 49 y.o. female w/ PMHx of lymphedema, severe asthma on Tezspire (follows w/ Dr. Khan, consulted interventional pulmonology Dr mc for bronchial thermoplasty), vocal cord dysfunction, fibromyalgia, chronic fatigue syndrome, fatty liver, migraine HA, provoked R knee DVT--> PE in 2020 not on AC presenting with sharp, stabbing LLQ abdominal pain over last few days, now progressed as constant generalized diffuse abd pain mainly in RUQ and LLQ. + chills and nausea. No fevers or vomiting. No cp or sob. +constipation but cannot remember last BM. + decreased oral intake. Has been taking ibuprofen without much relief. Last episode of diverticulitis ~ 5 years ago.  Last colonoscopy 9/2021, 1 polyp removed, recommending 7-year follow-up    Though not pertinent to admission compliant, does endorse frontal headache and sinus pain BL as well as BL ear pain. Also endorses since last admission for syncope/migraine has had severe headache x7 weeks, photophobia, difficulty with speaking and vague memory loss. She describes that she lost consciousness during laughter. Evaluated by Dr. Stevens, neurologist who felt her neuro sx were due to migraine w. Aura.    ED course:  -VS: 30 6.5C, heart rate 94, RR 11, /61, satting 98% on room air  -Labs: CBC with leukocytosis of 15 with left shift, no anemia and platelets WNL.  CMP only remarkable for potassium of 3.4.  COVID and flu negative.  UA negative for infection  -CT abdomen pelvis with contrast remarkable for multifocal acute diverticulitis   -Interventions: Administered 4 mg of morphine, 1 L LR and Zosyn as well as Zofran admitted.      Past Medical History  She has a past medical history of Asthma ([facility]) and Personal history of other diseases of the respiratory system.    Surgical History  She has a past surgical history that includes Esophagogastroduodenoscopy (09/18/2014); Other surgical history (03/05/2020); Laparoscopy  diagnostic / biopsy / aspiration / lysis (2014); Breast surgery (2014);  section, classic (2014); and Umbilical hernia repair (2014).     Social History  She reports that she has never smoked. She has never used smokeless tobacco. She reports current alcohol use. She reports that she does not use drugs.    Family History  Family History   Problem Relation Name Age of Onset    HOLLY disease Maternal Grandfather      Newman's esophagus Maternal Grandfather      Asthma Other Aunt         Allergies  Opioids - morphine analogues    Review of Systems   Constitutional:  Positive for appetite change and chills. Negative for activity change and fever.   HENT:  Positive for ear pain, sinus pressure and sinus pain. Negative for congestion, ear discharge, facial swelling, hearing loss, postnasal drip, rhinorrhea, sore throat and trouble swallowing.    Respiratory:  Negative for cough, chest tightness and shortness of breath.    Cardiovascular:  Negative for chest pain and leg swelling.   Gastrointestinal:  Positive for abdominal distention, abdominal pain and nausea. Negative for constipation, diarrhea and vomiting.   Genitourinary:  Negative for dysuria, frequency and urgency.   Allergic/Immunologic: Negative for immunocompromised state.   Neurological:  Positive for speech difficulty and headaches. Negative for dizziness, tremors, syncope and weakness.        Physical Exam  Constitutional:       General: She is not in acute distress.     Appearance: She is not ill-appearing or toxic-appearing.   HENT:      Right Ear: Tympanic membrane, ear canal and external ear normal. There is no impacted cerumen.      Left Ear: Tympanic membrane, ear canal and external ear normal. There is no impacted cerumen.      Nose: No congestion.      Mouth/Throat:      Mouth: Mucous membranes are moist.      Pharynx: Oropharynx is clear.   Pulmonary:      Effort: Pulmonary effort is normal. No respiratory distress.    Abdominal:      General: Abdomen is flat. Bowel sounds are normal. There is no distension.      Tenderness: There is abdominal tenderness. There is no guarding or rebound.   Musculoskeletal:      Right lower leg: No edema.      Left lower leg: No edema.      Comments: Chronic lymphedema   Skin:     General: Skin is warm and dry.      Capillary Refill: Capillary refill takes less than 2 seconds.   Neurological:      General: No focal deficit present.      Mental Status: She is alert and oriented to person, place, and time. Mental status is at baseline.   Psychiatric:         Mood and Affect: Mood normal.         Behavior: Behavior normal.          Last Recorded Vitals  /77   Pulse 87   Temp 36.4 °C (97.5 °F)   Resp 18   Wt 97.6 kg (215 lb 2.7 oz)   SpO2 95%     Relevant Results  Results for orders placed or performed during the hospital encounter of 09/07/24 (from the past 24 hour(s))   CBC and Auto Differential   Result Value Ref Range    WBC 15.1 (H) 4.4 - 11.3 x10*3/uL    nRBC 0.0 0.0 - 0.0 /100 WBCs    RBC 4.07 4.00 - 5.20 x10*6/uL    Hemoglobin 12.6 12.0 - 16.0 g/dL    Hematocrit 36.5 36.0 - 46.0 %    MCV 90 80 - 100 fL    MCH 31.0 26.0 - 34.0 pg    MCHC 34.5 32.0 - 36.0 g/dL    RDW 13.3 11.5 - 14.5 %    Platelets 286 150 - 450 x10*3/uL    Neutrophils % 72.8 40.0 - 80.0 %    Immature Granulocytes %, Automated 0.4 0.0 - 0.9 %    Lymphocytes % 18.5 13.0 - 44.0 %    Monocytes % 6.7 2.0 - 10.0 %    Eosinophils % 1.3 0.0 - 6.0 %    Basophils % 0.3 0.0 - 2.0 %    Neutrophils Absolute 10.98 (H) 1.20 - 7.70 x10*3/uL    Immature Granulocytes Absolute, Automated 0.06 0.00 - 0.70 x10*3/uL    Lymphocytes Absolute 2.79 1.20 - 4.80 x10*3/uL    Monocytes Absolute 1.01 (H) 0.10 - 1.00 x10*3/uL    Eosinophils Absolute 0.20 0.00 - 0.70 x10*3/uL    Basophils Absolute 0.05 0.00 - 0.10 x10*3/uL   Comprehensive metabolic panel   Result Value Ref Range    Glucose 120 (H) 74 - 99 mg/dL    Sodium 135 (L) 136 - 145 mmol/L     Potassium 3.4 (L) 3.5 - 5.3 mmol/L    Chloride 103 98 - 107 mmol/L    Bicarbonate 22 21 - 32 mmol/L    Anion Gap 13 10 - 20 mmol/L    Urea Nitrogen 12 6 - 23 mg/dL    Creatinine 0.74 0.50 - 1.05 mg/dL    eGFR >90 >60 mL/min/1.73m*2    Calcium 9.5 8.6 - 10.3 mg/dL    Albumin 4.1 3.4 - 5.0 g/dL    Alkaline Phosphatase 61 33 - 110 U/L    Total Protein 6.9 6.4 - 8.2 g/dL    AST 18 9 - 39 U/L    Bilirubin, Total 0.4 0.0 - 1.2 mg/dL    ALT 30 7 - 45 U/L   Lipase   Result Value Ref Range    Lipase 15 9 - 82 U/L   Lactate   Result Value Ref Range    Lactate 1.0 0.4 - 2.0 mmol/L   Blood Culture    Specimen: Peripheral Venipuncture; Blood culture   Result Value Ref Range    Blood Culture Loaded on Instrument - Culture in progress    Blood Culture    Specimen: Peripheral Venipuncture; Blood culture   Result Value Ref Range    Blood Culture Loaded on Instrument - Culture in progress    Influenza A, and B PCR   Result Value Ref Range    Flu A Result Not Detected Not Detected    Flu B Result Not Detected Not Detected   Sars-CoV-2 PCR   Result Value Ref Range    Coronavirus 2019, PCR Not Detected Not Detected   Urinalysis with Reflex Culture and Microscopic   Result Value Ref Range    Color, Urine Colorless (N) Light-Yellow, Yellow, Dark-Yellow    Appearance, Urine Clear Clear    Specific Gravity, Urine 1.021 1.005 - 1.035    pH, Urine 7.5 5.0, 5.5, 6.0, 6.5, 7.0, 7.5, 8.0    Protein, Urine 10 (TRACE) NEGATIVE, 10 (TRACE), 20 (TRACE) mg/dL    Glucose, Urine Normal Normal mg/dL    Blood, Urine OVER (3+) (A) NEGATIVE    Ketones, Urine NEGATIVE NEGATIVE mg/dL    Bilirubin, Urine NEGATIVE NEGATIVE    Urobilinogen, Urine Normal Normal mg/dL    Nitrite, Urine NEGATIVE NEGATIVE    Leukocyte Esterase, Urine NEGATIVE NEGATIVE   Urinalysis Microscopic   Result Value Ref Range    WBC, Urine 6-10 (A) 1-5, NONE /HPF    RBC, Urine >20 (A) NONE, 1-2, 3-5 /HPF    Squamous Epithelial Cells, Urine 1-9 (SPARSE) Reference range not established. /HPF      CT abdomen pelvis w IV contrast    Result Date: 9/7/2024  Interpreted By:  Rao Corey, STUDY: CT ABDOMEN PELVIS W IV CONTRAST; 9/7/2024 6:07 am   INDICATION: Signs/Symptoms:Left lower quadrant abdominal pain starting Tuesday, Wednesday St.  He diffuse abdominal pain, fevers and chills. History of diverticulitis.;   COMPARISON: 03/02/2020   ACCESSION NUMBER(S): [id number]   ORDERING CLINICIAN: ELISABETH LEONE   TECHNIQUE: Contiguous axial images of the abdomen/pelvis were performed with IV contrast. 75 ml of Omnipaque 350 was utilized. Coronal and sagittal reformatted images were also obtained. All CT examinations are performed with 1 or more of the following dose reduction techniques: Automated exposure control, adjustment of mA and/or kv according to patient's size, or use of iterative reconstruction techniques.     FINDINGS: The liver, gallbladder,  common bile duct, pancreas, spleen, and adrenal glands are unremarkable.   The kidneys enhance symmetrically. No urolithiasis is seen. No hydroureteronephrosis is seen.   The visualized aorta is unremarkable.   The small bowel is not dilated. The appendix is within normal limits. Diverticulosis of the colon. There is focal submucosal edema and pericolonic inflammatory change surrounding a diverticulum in the mid-distal descending colon. There is also a secondary focal site of pericolonic inflammation surrounding a diverticulum in the proximal sigmoid colon. Findings are consistent with multifocal acute diverticulitis.   No free intraperitoneal air or fluid is seen.   The bladder is well distended with no gross wall thickening.   There is a left adnexal cyst measuring 4.3 x 4.0 cm.   The visualized osseous structures are intact.   Limited images of the lower thorax are unremarkable.       1. Multifocal acute diverticulitis involving the mid-distal descending colon and proximal sigmoid colon. No abscess or drainable collection.   2. Left adnexal cyst measuring  4.3 x 4.0 cm.   Signed by: Rao Corey 9/7/2024 7:34 AM Dictation workstation:   [id number]        Assessment/Plan   Mrs. Rema Silver is a 49yoF with HX diverticulitis 5 years ago presenting for LLQ abdominal pain, nausea and chills. Found to have multifocal diverticulitis on CT scan. Admitted for IV abx and pain control.    Acute diverticulitis  Hx Diverticulitis  Acute hypokalemia  -Continue Zosyn, likely de-escalate tomorrow  - Full liquid diet  - Zofran 4mg q6hr for nausea  - Morphine 4mg for severe pain, 2mg breakthrough pain  - Repleted K+ 40meQ  - FU outpatient GI    Headache, likely migraine  Hx migraine headaches  -Will trial migraine cocktail with fluids, mag bolus, Iv toradol 15mgx1, reglan 10mgx1, and oral benadryl based on her improved clinical response from last admission  -FU with neurology for further management  -continue home topirimate    Sinus pain, ear pain +pressure  -flonase    Lymphedema  Severe persistent asthma (follows w/ Dr. Khan)  Fatty liver  Provoked R knee DVT--> PE in 2020 not on AC  GERD  -continue home meds without changes    IVF: LR at 125  Tele: Not indicated  Diet: Full liquid  Consults: None  DVT ppx: Lovenox  Code status: Full    Dispo: Likely tomorrow to home pending improvement of abdominal pain and toleration of diet.    Assessment and plan discussed with attending.   Haley Scott DO

## 2024-09-07 NOTE — PROGRESS NOTES
Emergency Medicine Transition of Care Note.    I received Rema Silver in signout from Dr. Tao.  Please see the previous ED provider note for all HPI, PE and MDM up to the time of signout at 0700. This is in addition to the primary record.    In brief Rema Silver is an 49 y.o. female presenting for left lower quadrant abdominal pain since Tuesday with associated chills and headache.  Chief Complaint   Patient presents with    Abdominal Pain     Pt reports abd pain for one week    Ear Fullness     Pt states she thinks she may have an ear infection or sinus infection     At the time of signout we were awaiting: Urinalysis as well as CT abdomen pelvis without contrast    ED Course as of 09/07/24 0928   Sat Sep 07, 2024   0600 Patient does have a leukocytosis here today, heart rate was greater than 90, making her SIRS positive.  Will defer on antibiotic treatment till I have a verifiable bacterial source to her symptoms.  Reasoning for this is that she is also having headaches, facial pain, there are sufficient suspicion of viral etiology here that I feel it is necessary to hold off on antibiotic therapy until a source is confirmed. [RD]   0903 CT scan shows acute diverticulitis without perforation or abscess.  Patient was already started on Zosyn by the previous provider.  Patient case discussed with teaching service who agreed with patient under the care at the level of [facility]. [CH]      ED Course User Index  [CH] Sara Ritter DO  [RD] Ilia Tao DO         Diagnoses as of 09/07/24 0928   Diverticulitis   Sepsis without acute organ dysfunction, due to unspecified organism (Multi)       Medical Decision Making      Final diagnoses:   None           Procedure  Procedures    Sara Ritter DO     Reviewed and approved by SARA RITTER on 9/7/24 at 7:21 AM.

## 2024-09-07 NOTE — CARE PLAN
The patient's goals for the shift include      The clinical goals for the shift include see POC  Pt arrived to the floor with complaints of Ab/HA. Morphine 2mg given for pain along with PRN Zofran for nausea. A/Ox4  On Full liquid diet.    LR going at 125ml/hr  K+ replaced. Meds given per order and tolerated well by patients.    Pt. Complained of another migraine and nausea. Migraine cocktail ordered and give. IV mag, benadryl, Toradol and Reglan given over 1 hour.     1600 Pt appears to be resting currently. Even rise and fall of the chest noted. Room remains darkened with lights out due to migraines.     Got permission from the team to run her antibiotics over 30 mins.

## 2024-09-08 VITALS
BODY MASS INDEX: 42.24 KG/M2 | OXYGEN SATURATION: 99 % | WEIGHT: 215.17 LBS | RESPIRATION RATE: 14 BRPM | TEMPERATURE: 98.1 F | HEART RATE: 81 BPM | DIASTOLIC BLOOD PRESSURE: 62 MMHG | HEIGHT: 60 IN | SYSTOLIC BLOOD PRESSURE: 99 MMHG

## 2024-09-08 LAB
ALBUMIN SERPL BCP-MCNC: 3.6 G/DL (ref 3.4–5)
ALP SERPL-CCNC: 57 U/L (ref 33–110)
ALT SERPL W P-5'-P-CCNC: 26 U/L (ref 7–45)
ANION GAP SERPL CALC-SCNC: 12 MMOL/L (ref 10–20)
AST SERPL W P-5'-P-CCNC: 17 U/L (ref 9–39)
BACTERIA BLD CULT: NORMAL
BACTERIA BLD CULT: NORMAL
BILIRUB SERPL-MCNC: 0.4 MG/DL (ref 0–1.2)
BUN SERPL-MCNC: 10 MG/DL (ref 6–23)
CALCIUM SERPL-MCNC: 8.5 MG/DL (ref 8.6–10.3)
CHLORIDE SERPL-SCNC: 107 MMOL/L (ref 98–107)
CO2 SERPL-SCNC: 21 MMOL/L (ref 21–32)
CREAT SERPL-MCNC: 0.77 MG/DL (ref 0.5–1.05)
EGFRCR SERPLBLD CKD-EPI 2021: >90 ML/MIN/1.73M*2
ERYTHROCYTE [DISTWIDTH] IN BLOOD BY AUTOMATED COUNT: 13.6 % (ref 11.5–14.5)
GLUCOSE SERPL-MCNC: 90 MG/DL (ref 74–99)
HCT VFR BLD AUTO: 35.7 % (ref 36–46)
HGB BLD-MCNC: 11.6 G/DL (ref 12–16)
MAGNESIUM SERPL-MCNC: 1.99 MG/DL (ref 1.6–2.4)
MCH RBC QN AUTO: 30.4 PG (ref 26–34)
MCHC RBC AUTO-ENTMCNC: 32.5 G/DL (ref 32–36)
MCV RBC AUTO: 94 FL (ref 80–100)
NRBC BLD-RTO: 0 /100 WBCS (ref 0–0)
PLATELET # BLD AUTO: 261 X10*3/UL (ref 150–450)
POTASSIUM SERPL-SCNC: 3.7 MMOL/L (ref 3.5–5.3)
PROT SERPL-MCNC: 6 G/DL (ref 6.4–8.2)
RBC # BLD AUTO: 3.82 X10*6/UL (ref 4–5.2)
SODIUM SERPL-SCNC: 136 MMOL/L (ref 136–145)
WBC # BLD AUTO: 9.2 X10*3/UL (ref 4.4–11.3)

## 2024-09-08 PROCEDURE — 2500000001 HC RX 250 WO HCPCS SELF ADMINISTERED DRUGS (ALT 637 FOR MEDICARE OP)

## 2024-09-08 PROCEDURE — 96366 THER/PROPH/DIAG IV INF ADDON: CPT

## 2024-09-08 PROCEDURE — 2500000004 HC RX 250 GENERAL PHARMACY W/ HCPCS (ALT 636 FOR OP/ED)

## 2024-09-08 PROCEDURE — 94640 AIRWAY INHALATION TREATMENT: CPT

## 2024-09-08 PROCEDURE — 99238 HOSP IP/OBS DSCHRG MGMT 30/<: CPT

## 2024-09-08 PROCEDURE — 85027 COMPLETE CBC AUTOMATED: CPT

## 2024-09-08 PROCEDURE — 36415 COLL VENOUS BLD VENIPUNCTURE: CPT

## 2024-09-08 PROCEDURE — 83735 ASSAY OF MAGNESIUM: CPT

## 2024-09-08 PROCEDURE — G0378 HOSPITAL OBSERVATION PER HR: HCPCS

## 2024-09-08 PROCEDURE — 80053 COMPREHEN METABOLIC PANEL: CPT

## 2024-09-08 PROCEDURE — 2500000002 HC RX 250 W HCPCS SELF ADMINISTERED DRUGS (ALT 637 FOR MEDICARE OP, ALT 636 FOR OP/ED)

## 2024-09-08 RX ORDER — AMOXICILLIN AND CLAVULANATE POTASSIUM 875; 125 MG/1; MG/1
1 TABLET, FILM COATED ORAL EVERY 8 HOURS
Qty: 27 TABLET | Refills: 0 | Status: SHIPPED | OUTPATIENT
Start: 2024-09-08 | End: 2024-09-17

## 2024-09-08 RX ORDER — AMOXICILLIN AND CLAVULANATE POTASSIUM 875; 125 MG/1; MG/1
1 TABLET, FILM COATED ORAL EVERY 8 HOURS
Status: DISCONTINUED | OUTPATIENT
Start: 2024-09-08 | End: 2024-09-08 | Stop reason: HOSPADM

## 2024-09-08 RX ADMIN — CYANOCOBALAMIN TAB 1000 MCG 1000 MCG: 1000 TAB at 08:04

## 2024-09-08 RX ADMIN — FLUTICASONE PROPIONATE 2 SPRAY: 50 SPRAY, METERED NASAL at 08:11

## 2024-09-08 RX ADMIN — FORMOTEROL FUMARATE 20 MCG: 20 SOLUTION RESPIRATORY (INHALATION) at 08:05

## 2024-09-08 RX ADMIN — CETIRIZINE HYDROCHLORIDE 10 MG: 10 TABLET, FILM COATED ORAL at 08:05

## 2024-09-08 RX ADMIN — PANTOPRAZOLE SODIUM 40 MG: 40 TABLET, DELAYED RELEASE ORAL at 08:05

## 2024-09-08 RX ADMIN — IPRATROPIUM BROMIDE AND ALBUTEROL SULFATE 3 ML: 2.5; .5 SOLUTION RESPIRATORY (INHALATION) at 08:05

## 2024-09-08 RX ADMIN — BUDESONIDE 0.5 MG: 0.5 INHALANT RESPIRATORY (INHALATION) at 08:05

## 2024-09-08 RX ADMIN — Medication 600 MG: at 08:04

## 2024-09-08 RX ADMIN — AMOXICILLIN AND CLAVULANATE POTASSIUM 1 TABLET: 875; 125 TABLET, FILM COATED ORAL at 08:04

## 2024-09-08 RX ADMIN — TOPIRAMATE 25 MG: 25 TABLET, FILM COATED ORAL at 08:05

## 2024-09-08 RX ADMIN — PIPERACILLIN SODIUM AND TAZOBACTAM SODIUM 3.38 G: 3; .375 INJECTION, SOLUTION INTRAVENOUS at 04:55

## 2024-09-08 RX ADMIN — MONTELUKAST 10 MG: 10 TABLET, FILM COATED ORAL at 08:04

## 2024-09-08 ASSESSMENT — COGNITIVE AND FUNCTIONAL STATUS - GENERAL
MOBILITY SCORE: 24
DAILY ACTIVITIY SCORE: 24

## 2024-09-08 ASSESSMENT — PAIN SCALES - GENERAL: PAINLEVEL_OUTOF10: 3

## 2024-09-08 ASSESSMENT — PAIN SCALES - WONG BAKER: WONGBAKER_NUMERICALRESPONSE: NO HURT

## 2024-09-08 NOTE — DISCHARGE SUMMARY
Discharge Diagnosis  Diverticulitis    Issues Requiring Follow-Up  Augmentin 875mg three times a day for 9 days   GI  PCP FU    Discharge Meds     Medication List      START taking these medications     amoxicillin-pot clavulanate 875-125 mg tablet; Commonly known as:   Augmentin; Take 1 tablet by mouth every 8 hours for 27 doses.     CONTINUE taking these medications     cetirizine 10 mg tablet; Commonly known as: ZyrTEC   cyanocobalamin 1,000 mcg tablet; Commonly known as: Vitamin B-12   diphenhydrAMINE 25 mg capsule; Commonly known as: BENADryl; Take 1   capsule (25 mg) by mouth if needed for other (migraine) for up to 5 days.   fluticasone propion-salmeteroL 500-50 mcg/dose diskus inhaler; Commonly   known as: Advair Diskus; Inhale 1 puff 2 times a day. Rinse mouth with   water after use to reduce aftertaste and incidence of candidiasis. Do not   swallow.   ipratropium-albuteroL 0.5-2.5 mg/3 mL nebulizer solution; Commonly known   as: Duo-Neb; Take 3 mL by nebulization see administration instructions.   Every 4-6 hours prn   magnesium oxide 500 mg magnesium tablet   metoclopramide 10 mg tablet; Commonly known as: Reglan; Take 1 tablet   (10 mg) by mouth if needed (nausea) for up to 5 days.   montelukast 10 mg tablet; Commonly known as: Singulair; Take 1 tablet   (10 mg) by mouth once daily.   ondansetron 8 mg tablet; Commonly known as: Zofran; Take 1 tablet (8 mg)   by mouth every 8 hours if needed for nausea (headache).   pantoprazole 40 mg EC tablet; Commonly known as: ProtoNix; Take 1 tablet   (40 mg) by mouth 2 times a day.   rizatriptan 10 mg tablet; Commonly known as: Maxalt; Take 1 tablet (10   mg) by mouth 1 time if needed for migraine. May repeat in 2 hours if   unresolved. Do not exceed 30 mg in 24 hours.   Spiriva Respimat 2.5 mcg/actuation inhaler; Generic drug: tiotropium;   Inhale 2 puffs once daily.   topiramate 25 mg tablet; Commonly known as: Topamax; 1 po at bedtime for   3 nights then increase  to primitivo.; Notes to patient: Twice daily       Test Results Pending At Discharge  Pending Labs       Order Current Status    Blood Culture Preliminary result    Blood Culture Preliminary result            Hospital Course  Rema Silver is a 49 y.o. female w/ PMHx of lymphedema, severe asthma on [place]sp[place] (follows w/ Dr. Khan, consulted interventional pulmonology Dr mc for bronchial thermoplasty), vocal cord dysfunction, fibromyalgia, chronic fatigue syndrome, fatty liver, migraine HA, provoked R knee DVT--> PE in 2020 not on AC presenting with sharp, stabbing LLQ abdominal pain over last few days, now progressed as constant generalized diffuse abd pain mainly in RUQ and LLQ. + chills and nausea. No fevers or vomiting. No cp or sob. +constipation but cannot remember last BM. + decreased oral intake. Has been taking ibuprofen without much relief. Last episode of diverticulitis ~ 5 years ago.  Last colonoscopy 9/2021, 1 polyp removed, recommending 7-year follow-up.    ED course:  -VS: 30 6.5C, heart rate 94, RR 11, /61, satting 98% on room air  -Labs: CBC with leukocytosis of 15 with left shift, no anemia and platelets WNL.  CMP only remarkable for potassium of 3.4.  COVID and flu negative.  UA negative for infection  -CT abdomen pelvis with contrast remarkable for multifocal acute diverticulitis   -Interventions: Administered 4 mg of morphine, 1 L LR and Zosyn as well as Zofran admitted.    Transition to oral diet without much pain nausea or vomiting.  Day of admission did have severe headache, likely migraine which resolved with Toradol Reglan fluids as well as oral Benadryl.  Leukocytosis resolved.  Abdominal pain improved and was discharged to home in stable condition.  To complete course of 10 days of antibiotics and to follow-up with GI as well as PCP.    Acute diverticulitis   Headache, likely migraine  Hx migraine headaches  -To follow-up with PCP, GI (colonoscopy) as well as neurology (for continued  management of SPECT of migraine)  -Start Augmentin 875 mg 3 times a day for the next 9 days.     Pertinent Physical Exam At Time of Discharge  Physical Exam  Vitals reviewed.   Constitutional:       General: She is not in acute distress.  HENT:      Head: Normocephalic.      Right Ear: External ear normal.      Left Ear: External ear normal.      Nose: Nose normal.   Eyes:      Extraocular Movements: Extraocular movements intact.      Pupils: Pupils are equal, round, and reactive to light.   Cardiovascular:      Rate and Rhythm: Normal rate and regular rhythm.      Heart sounds: Normal heart sounds.   Pulmonary:      Effort: Pulmonary effort is normal.      Breath sounds: Normal breath sounds.   Abdominal:      Palpations: Abdomen is soft.      Tenderness: There is no abdominal tenderness. There is no guarding.   Musculoskeletal:      Cervical back: Normal range of motion and neck supple.   Skin:     General: Skin is warm and dry.   Neurological:      Mental Status: She is alert. Mental status is at baseline.   Psychiatric:         Mood and Affect: Mood normal.         Behavior: Behavior normal.         Outpatient Follow-Up  Future Appointments   Date Time Provider Department Center   9/23/2024  9:30 AM Claire Nicole MD [id number] [place]   10/3/2024  9:00 AM Ebonie Prescott MD [id number]   1/8/2025 10:40 AM Mary Hanna MD [id number] [place]         Haley Scott DO

## 2024-09-08 NOTE — NURSING NOTE
EOS:  Slept well through the night.  Medicated with Toradol and Compazine at bedtime for migraine with good results.  Refused to be awakened during the night for vital signs so missed the midnight vitals.  Ambulated to bathroom a few times with standby, steady, tolerated well.  Tolerating p.o. fluids well, expresses decreased abdominal discomfort.

## 2024-09-08 NOTE — DISCHARGE INSTRUCTIONS
Please follow up with your primary care provider within 7 days for hospital follow up. Please call to make this appointment.   Please follow-up with your gastroenterologist, you might need a colonoscopy in about 6 to 8 weeks.    MED changes:  Start Augmentin 875 mg 3 times a day for the next 9 days.  This is an antibiotic for the diverticulitis.    Please take your medications as prescribed.     If you have any new or worsening symptoms seek medical attention.    Thank you for allowing us to participate in your care!    -[facility] Inpatient Medicine Teaching Service.

## 2024-09-08 NOTE — CARE PLAN
The patient's goals for the shift include      The clinical goals for the shift include see plan of care  EOS: Pt  A/Ox 4. Free from pain N/V  VSS. Discharge paperwork given and understood. IV removed.   Meds given per order and tolerated well by pt.

## 2024-09-09 ENCOUNTER — TELEPHONE (OUTPATIENT)
Dept: GASTROENTEROLOGY | Facility: CLINIC | Age: 49
End: 2024-09-09
Payer: COMMERCIAL

## 2024-09-09 NOTE — TELEPHONE ENCOUNTER
Received request for patient to have follow up as outpatient from recent ED visit to discuss having a colonoscopy done. Left message for patient to return call to be scheduled.

## 2024-09-11 LAB
BACTERIA BLD CULT: NORMAL
BACTERIA BLD CULT: NORMAL

## 2024-09-18 DIAGNOSIS — J45.50 SEVERE PERSISTENT ASTHMA WITHOUT COMPLICATION (MULTI): ICD-10-CM

## 2024-09-18 RX ORDER — IPRATROPIUM BROMIDE AND ALBUTEROL SULFATE 2.5; .5 MG/3ML; MG/3ML
3 SOLUTION RESPIRATORY (INHALATION) 4 TIMES DAILY PRN
Qty: 720 ML | Refills: 3 | Status: SHIPPED | OUTPATIENT
Start: 2024-09-18

## 2024-09-22 NOTE — PROGRESS NOTES
"Standard Progress Note  Chief Complaint   Patient presents with    Follow-up     Pt here for 1 mo FUV.  Pt states \"I was also in the hospital two weeks ago and was admitted.\"  Pt admitted [facility] 24    Hospital Follow-up     49 year old woman last visit 2024.   fatty liver  [facility]-diagnosed with lymphadema    admission at [facility].   2024.  Thought to be a headache.  back of her neck basically from her lower scalp her neck and upper thoracic feels like it is being stretched.  She reports that her asthma is very severe and life-threatening.  She sees a pulmonologist at [facility].  She states that she almost  several times      At last visit rx topamax for headaches  Since last visit admission for diverticulitis. 3rd admission this year.   HA feel like world is swirling. Tingling all over head. Feels like could pass out. Flonase helps  Did not schedule appt with neurologist yet.  Topamax seems to be helping. Would like to try stronger dose    Still has pain from diverticulitis. [place] diet. Pain epigastric and lower abd. +urinary frequency.   Feels depressed because of medical illnesses. Would like to try medication.       Patient Active Problem List   Diagnosis    Abdominal pain, epigastric    Abnormality, skin    Arthritis    Tendonitis    Allergic rhinitis    Ankle sprain    Asthma (HHS-HCC)    Sleep apnea    Bacterial vaginitis    Chronic constipation    Diarrhea    Change in bowel habits    Calcaneal spur of left foot    Calcaneal spur    Dyspepsia    Diverticulitis of colon    Chronic fatigue syndrome    Insomnia    Hypoglycemia    Hyperlipidemia    Hyperglycemia    Hiatal hernia    GERD (gastroesophageal reflux disease)    Fibromyalgia    Muscle tension dysphonia    Vocal cord disease    Lesion of neck    Irritable larynx    Severe persistent asthma without complication (Multi)    Pain of left heel    Vasovagal symptom    Venous insufficiency (chronic) (peripheral)    Vitamin D " deficiency    Vocal cord dysfunction    Atypical chest pain    Lower extremity edema    Feeling weak    Heel spur, right    Shortness of breath    Peripheral eosinophilia    Severe persistent asthma with exacerbation (Multi)    Diverticulitis    Localized, primary osteoarthritis     Blood pressure 110/68, pulse 96, temperature 36.7 °C (98.1 °F), height 1.524 m (5'), weight 95.7 kg (211 lb), SpO2 98%.  Body mass index is 41.21 kg/m².  Tearful at times. Good eye contact.   Lymph nodes: No cervical or clavicular adenopathy  Cardiovascular: Regular rate rhythm S1-S2 normal no murmur. No carotid bruit.   Lungs: Clear to Auscultation without wheezing, rales, or rhonchi, Breath sounds symmetric. No use of accessory muscles.    Abdomen:  Normoactive bowel sounds, soft, +epigastric and lower abdomen including suprapubic.   extremities: Wrists are  small compared to her forearm  Neuro: no facial asymmetry  Skin: no rash noted    Lab Results   Component Value Date    GLUCOSE 90 09/08/2024    CALCIUM 8.5 (L) 09/08/2024     09/08/2024    K 3.7 09/08/2024    CO2 21 09/08/2024     09/08/2024    BUN 10 09/08/2024    CREATININE 0.77 09/08/2024      Lab Results   Component Value Date    WBC 9.2 09/08/2024    HGB 11.6 (L) 09/08/2024    HCT 35.7 (L) 09/08/2024    MCV 94 09/08/2024     09/08/2024 07/2024 CT angio: no PE. Diffuse hepatic steatosis.   07/2024 CT head without contrast: no acute  7/2024 MRI brain with and without contrast: no acute infarct, mass, or midline shift    1. Depression, unspecified depression type  - escitalopram (Lexapro) 5 mg tablet; Take 1 tablet (5 mg) by mouth once daily.  Dispense: 30 tablet; Refill: 2    2. Anemia, unspecified type  Mild. Recheck now  - CBC and Auto Differential; Future  - Ferritin; Future  - Iron and TIBC; Future    3. Diverticulitis  Check labs including cbc, cmp, repeat ct.  - amoxicillin-pot clavulanate (Augmentin) 875-125 mg tablet; Take 1 tablet (875 mg) by  mouth 2 times a day for 10 days.  Dispense: 20 tablet; Refill: 0    4. Intractable headache, unspecified chronicity pattern, unspecified headache type  -increase topamax from 25 mg bid to 50 qam and 25 qm  Encouraged to make appt with neurologist.    topiramate (Topamax) 25 mg tablet; 2 po qam and 1 po qhs  Dispense: 270 tablet; Refill: 1    5. Abnormal uterine bleeding unrelated to menstrual cycle  - Referral to Gynecology; Future    6. Adnexal cyst  - Referral to Gynecology; Future    7. Abdominal pain, unspecified abdominal location  - Urine Culture; Future  - Urinalysis with Reflex Microscopic; Future  - CBC and Auto Differential; Future  - Comprehensive Metabolic Panel; Future  - CT abdomen pelvis w IV contrast; Future    F/up 1 month  Mammogram previously  ordered  Pap 9/23/2020-reviewed report including hpv testing-normal. Repeat due 5 years  Colonoscopy 9/2021 Tubular adenoma Dr Powell.     Current Outpatient Medications on File Prior to Visit   Medication Sig Dispense Refill    cetirizine (ZyrTEC) 10 mg tablet Take 1 tablet (10 mg) by mouth once daily.      cyanocobalamin (Vitamin B-12) 1,000 mcg tablet Take 1 tablet (1,000 mcg) by mouth once daily.      diphenhydrAMINE (BENADryl) 25 mg capsule Take 1 capsule (25 mg) by mouth if needed for other (migraine) for up to 5 days. 5 capsule 0    fluticasone propion-salmeteroL (Advair Diskus) 500-50 mcg/dose diskus inhaler Inhale 1 puff 2 times a day. Rinse mouth with water after use to reduce aftertaste and incidence of candidiasis. Do not swallow. 60 each 11    ipratropium-albuteroL (Duo-Neb) 0.5-2.5 mg/3 mL nebulizer solution Take 3 mL by nebulization 4 times a day as needed for wheezing. 720 mL 3    magnesium oxide 500 mg magnesium tablet Take 1 tablet (500 mg) by mouth once daily.      montelukast (Singulair) 10 mg tablet Take 1 tablet (10 mg) by mouth once daily. 90 tablet 3    ondansetron (Zofran) 8 mg tablet Take 1 tablet (8 mg) by mouth every 8 hours if  needed for nausea (headache). 30 tablet 1    pantoprazole (ProtoNix) 40 mg EC tablet Take 1 tablet (40 mg) by mouth 2 times a day. (Patient taking differently: Take 2 tablets (80 mg) by mouth once daily in the morning. Take before meals.) 60 tablet 5    rizatriptan (Maxalt) 10 mg tablet Take 1 tablet (10 mg) by mouth 1 time if needed for migraine. May repeat in 2 hours if unresolved. Do not exceed 30 mg in 24 hours. 30 tablet 3    Spiriva Respimat 2.5 mcg/actuation inhaler Inhale 2 puffs once daily. 1 each 11    topiramate (Topamax) 25 mg tablet 1 po at bedtime for 3 nights then increase to bide. (Patient taking differently: Take 1 tablet (25 mg) by mouth 2 times a day.) 60 tablet 2    [] amoxicillin-pot clavulanate (Augmentin) 875-125 mg tablet Take 1 tablet by mouth every 8 hours for 27 doses. 27 tablet 0    metoclopramide (Reglan) 10 mg tablet Take 1 tablet (10 mg) by mouth if needed (nausea) for up to 5 days. 5 tablet 0    [DISCONTINUED] ipratropium-albuteroL (Duo-Neb) 0.5-2.5 mg/3 mL nebulizer solution Take 3 mL by nebulization see administration instructions. Every 4-6 hours prn (Patient taking differently: Take 6 mL by nebulization 4 times a day. Every 4-6 hours prn) 360 mL 6     No current facility-administered medications on file prior to visit.         Claire Duron MD

## 2024-09-23 ENCOUNTER — HOSPITAL ENCOUNTER (OUTPATIENT)
Dept: RADIOLOGY | Facility: CLINIC | Age: 49
Discharge: HOME | End: 2024-09-23
Payer: COMMERCIAL

## 2024-09-23 ENCOUNTER — LAB (OUTPATIENT)
Dept: LAB | Facility: LAB | Age: 49
End: 2024-09-23
Payer: COMMERCIAL

## 2024-09-23 ENCOUNTER — APPOINTMENT (OUTPATIENT)
Dept: PRIMARY CARE | Facility: CLINIC | Age: 49
End: 2024-09-23
Payer: COMMERCIAL

## 2024-09-23 VITALS
HEIGHT: 60 IN | BODY MASS INDEX: 41.43 KG/M2 | HEART RATE: 96 BPM | OXYGEN SATURATION: 98 % | WEIGHT: 211 LBS | TEMPERATURE: 98.1 F | DIASTOLIC BLOOD PRESSURE: 68 MMHG | SYSTOLIC BLOOD PRESSURE: 110 MMHG

## 2024-09-23 DIAGNOSIS — R10.9 ABDOMINAL PAIN, UNSPECIFIED ABDOMINAL LOCATION: ICD-10-CM

## 2024-09-23 DIAGNOSIS — R73.9 HYPERGLYCEMIA: ICD-10-CM

## 2024-09-23 DIAGNOSIS — F32.A DEPRESSION, UNSPECIFIED DEPRESSION TYPE: Primary | ICD-10-CM

## 2024-09-23 DIAGNOSIS — D64.9 ANEMIA, UNSPECIFIED TYPE: ICD-10-CM

## 2024-09-23 DIAGNOSIS — R51.9 INTRACTABLE HEADACHE, UNSPECIFIED CHRONICITY PATTERN, UNSPECIFIED HEADACHE TYPE: ICD-10-CM

## 2024-09-23 DIAGNOSIS — N94.9 ADNEXAL CYST: ICD-10-CM

## 2024-09-23 DIAGNOSIS — Z13.220 SCREENING, LIPID: ICD-10-CM

## 2024-09-23 DIAGNOSIS — N93.9 ABNORMAL UTERINE BLEEDING UNRELATED TO MENSTRUAL CYCLE: ICD-10-CM

## 2024-09-23 DIAGNOSIS — K57.92 DIVERTICULITIS: ICD-10-CM

## 2024-09-23 DIAGNOSIS — E55.9 VITAMIN D DEFICIENCY: ICD-10-CM

## 2024-09-23 LAB
25(OH)D3 SERPL-MCNC: 23 NG/ML (ref 30–100)
ALBUMIN SERPL BCP-MCNC: 4.5 G/DL (ref 3.4–5)
ALP SERPL-CCNC: 60 U/L (ref 33–110)
ALT SERPL W P-5'-P-CCNC: 24 U/L (ref 7–45)
ANION GAP SERPL CALC-SCNC: 10 MMOL/L (ref 10–20)
APPEARANCE UR: CLEAR
AST SERPL W P-5'-P-CCNC: 18 U/L (ref 9–39)
BASOPHILS # BLD AUTO: 0.05 X10*3/UL (ref 0–0.1)
BASOPHILS NFR BLD AUTO: 0.6 %
BILIRUB SERPL-MCNC: 0.3 MG/DL (ref 0–1.2)
BILIRUB UR STRIP.AUTO-MCNC: NEGATIVE MG/DL
BUN SERPL-MCNC: 12 MG/DL (ref 6–23)
CALCIUM SERPL-MCNC: 9.4 MG/DL (ref 8.6–10.3)
CHLORIDE SERPL-SCNC: 108 MMOL/L (ref 98–107)
CHOLEST SERPL-MCNC: 221 MG/DL (ref 0–199)
CHOLESTEROL/HDL RATIO: 4.3
CO2 SERPL-SCNC: 25 MMOL/L (ref 21–32)
COLOR UR: YELLOW
CREAT SERPL-MCNC: 0.81 MG/DL (ref 0.5–1.05)
EGFRCR SERPLBLD CKD-EPI 2021: 88 ML/MIN/1.73M*2
EOSINOPHIL # BLD AUTO: 0.09 X10*3/UL (ref 0–0.7)
EOSINOPHIL NFR BLD AUTO: 1 %
ERYTHROCYTE [DISTWIDTH] IN BLOOD BY AUTOMATED COUNT: 13.4 % (ref 11.5–14.5)
EST. AVERAGE GLUCOSE BLD GHB EST-MCNC: 108 MG/DL
FERRITIN SERPL-MCNC: 83 NG/ML (ref 8–150)
GLUCOSE SERPL-MCNC: 114 MG/DL (ref 74–99)
GLUCOSE UR STRIP.AUTO-MCNC: NORMAL MG/DL
HBA1C MFR BLD: 5.4 %
HCT VFR BLD AUTO: 39.3 % (ref 36–46)
HDLC SERPL-MCNC: 52 MG/DL
HGB BLD-MCNC: 13.2 G/DL (ref 12–16)
IMM GRANULOCYTES # BLD AUTO: 0.03 X10*3/UL (ref 0–0.7)
IMM GRANULOCYTES NFR BLD AUTO: 0.3 % (ref 0–0.9)
IRON SATN MFR SERPL: 14 % (ref 25–45)
IRON SERPL-MCNC: 48 UG/DL (ref 35–150)
KETONES UR STRIP.AUTO-MCNC: NEGATIVE MG/DL
LDLC SERPL CALC-MCNC: 126 MG/DL
LEUKOCYTE ESTERASE UR QL STRIP.AUTO: NEGATIVE
LYMPHOCYTES # BLD AUTO: 3.18 X10*3/UL (ref 1.2–4.8)
LYMPHOCYTES NFR BLD AUTO: 35.5 %
MCH RBC QN AUTO: 30.8 PG (ref 26–34)
MCHC RBC AUTO-ENTMCNC: 33.6 G/DL (ref 32–36)
MCV RBC AUTO: 92 FL (ref 80–100)
MONOCYTES # BLD AUTO: 0.66 X10*3/UL (ref 0.1–1)
MONOCYTES NFR BLD AUTO: 7.4 %
NEUTROPHILS # BLD AUTO: 4.94 X10*3/UL (ref 1.2–7.7)
NEUTROPHILS NFR BLD AUTO: 55.2 %
NITRITE UR QL STRIP.AUTO: NEGATIVE
NON HDL CHOLESTEROL: 169 MG/DL (ref 0–149)
NRBC BLD-RTO: 0 /100 WBCS (ref 0–0)
PH UR STRIP.AUTO: 7 [PH]
PLATELET # BLD AUTO: 368 X10*3/UL (ref 150–450)
POTASSIUM SERPL-SCNC: 4.4 MMOL/L (ref 3.5–5.3)
PROT SERPL-MCNC: 6.9 G/DL (ref 6.4–8.2)
PROT UR STRIP.AUTO-MCNC: NEGATIVE MG/DL
RBC # BLD AUTO: 4.29 X10*6/UL (ref 4–5.2)
RBC # UR STRIP.AUTO: NEGATIVE /UL
SODIUM SERPL-SCNC: 139 MMOL/L (ref 136–145)
SP GR UR STRIP.AUTO: 1.01
TIBC SERPL-MCNC: 342 UG/DL (ref 240–445)
TRIGL SERPL-MCNC: 213 MG/DL (ref 0–149)
UIBC SERPL-MCNC: 294 UG/DL (ref 110–370)
UROBILINOGEN UR STRIP.AUTO-MCNC: NORMAL MG/DL
VLDL: 43 MG/DL (ref 0–40)
WBC # BLD AUTO: 9 X10*3/UL (ref 4.4–11.3)

## 2024-09-23 PROCEDURE — 83550 IRON BINDING TEST: CPT

## 2024-09-23 PROCEDURE — 87086 URINE CULTURE/COLONY COUNT: CPT

## 2024-09-23 PROCEDURE — 83540 ASSAY OF IRON: CPT

## 2024-09-23 PROCEDURE — 81003 URINALYSIS AUTO W/O SCOPE: CPT

## 2024-09-23 PROCEDURE — 3008F BODY MASS INDEX DOCD: CPT | Performed by: INTERNAL MEDICINE

## 2024-09-23 PROCEDURE — 80053 COMPREHEN METABOLIC PANEL: CPT

## 2024-09-23 PROCEDURE — 85025 COMPLETE CBC W/AUTO DIFF WBC: CPT

## 2024-09-23 PROCEDURE — 82306 VITAMIN D 25 HYDROXY: CPT

## 2024-09-23 PROCEDURE — 82728 ASSAY OF FERRITIN: CPT

## 2024-09-23 PROCEDURE — 36415 COLL VENOUS BLD VENIPUNCTURE: CPT

## 2024-09-23 PROCEDURE — 74177 CT ABD & PELVIS W/CONTRAST: CPT

## 2024-09-23 PROCEDURE — 83036 HEMOGLOBIN GLYCOSYLATED A1C: CPT

## 2024-09-23 PROCEDURE — 99214 OFFICE O/P EST MOD 30 MIN: CPT | Performed by: INTERNAL MEDICINE

## 2024-09-23 PROCEDURE — 74177 CT ABD & PELVIS W/CONTRAST: CPT | Performed by: RADIOLOGY

## 2024-09-23 PROCEDURE — 2550000001 HC RX 255 CONTRASTS: Performed by: INTERNAL MEDICINE

## 2024-09-23 PROCEDURE — 1036F TOBACCO NON-USER: CPT | Performed by: INTERNAL MEDICINE

## 2024-09-23 PROCEDURE — 80061 LIPID PANEL: CPT

## 2024-09-23 RX ORDER — ESCITALOPRAM OXALATE 5 MG/1
5 TABLET ORAL DAILY
Qty: 30 TABLET | Refills: 2 | Status: SHIPPED | OUTPATIENT
Start: 2024-09-23 | End: 2024-12-22

## 2024-09-23 RX ORDER — TOPIRAMATE 25 MG/1
TABLET ORAL
Qty: 270 TABLET | Refills: 1 | Status: SHIPPED | OUTPATIENT
Start: 2024-09-23

## 2024-09-23 RX ORDER — AMOXICILLIN AND CLAVULANATE POTASSIUM 875; 125 MG/1; MG/1
875 TABLET, FILM COATED ORAL 2 TIMES DAILY
Qty: 20 TABLET | Refills: 0 | Status: SHIPPED | OUTPATIENT
Start: 2024-09-23 | End: 2024-10-03

## 2024-09-23 ASSESSMENT — PATIENT HEALTH QUESTIONNAIRE - PHQ9
8. MOVING OR SPEAKING SO SLOWLY THAT OTHER PEOPLE COULD HAVE NOTICED. OR THE OPPOSITE, BEING SO FIGETY OR RESTLESS THAT YOU HAVE BEEN MOVING AROUND A LOT MORE THAN USUAL: NEARLY EVERY DAY
SUM OF ALL RESPONSES TO PHQ QUESTIONS 1-9: 23
4. FEELING TIRED OR HAVING LITTLE ENERGY: NEARLY EVERY DAY
6. FEELING BAD ABOUT YOURSELF - OR THAT YOU ARE A FAILURE OR HAVE LET YOURSELF OR YOUR FAMILY DOWN: NOT AT ALL
1. LITTLE INTEREST OR PLEASURE IN DOING THINGS: NEARLY EVERY DAY
10. IF YOU CHECKED OFF ANY PROBLEMS, HOW DIFFICULT HAVE THESE PROBLEMS MADE IT FOR YOU TO DO YOUR WORK, TAKE CARE OF THINGS AT HOME, OR GET ALONG WITH OTHER PEOPLE: VERY DIFFICULT
SUM OF ALL RESPONSES TO PHQ9 QUESTIONS 1 & 2: 6
5. POOR APPETITE OR OVEREATING: NEARLY EVERY DAY
3. TROUBLE FALLING OR STAYING ASLEEP: NEARLY EVERY DAY
7. TROUBLE CONCENTRATING ON THINGS, SUCH AS READING THE NEWSPAPER OR WATCHING TELEVISION: MORE THAN HALF THE DAYS
2. FEELING DOWN, DEPRESSED OR HOPELESS: NEARLY EVERY DAY
9. THOUGHTS THAT YOU WOULD BE BETTER OFF DEAD, OR OF HURTING YOURSELF: NEARLY EVERY DAY

## 2024-09-23 ASSESSMENT — PAIN SCALES - GENERAL: PAINLEVEL: 4

## 2024-09-24 DIAGNOSIS — E55.9 VITAMIN D DEFICIENCY: Primary | ICD-10-CM

## 2024-09-24 LAB — BACTERIA UR CULT: NO GROWTH

## 2024-09-24 RX ORDER — CHOLECALCIFEROL (VITAMIN D3) 1250 MCG
TABLET ORAL
Qty: 8 TABLET | Refills: 0 | Status: SHIPPED | OUTPATIENT
Start: 2024-09-24

## 2024-09-27 ENCOUNTER — TELEPHONE (OUTPATIENT)
Dept: NEUROLOGY | Facility: CLINIC | Age: 49
End: 2024-09-27
Payer: COMMERCIAL

## 2024-09-27 NOTE — TELEPHONE ENCOUNTER
Patient called to schedule an appt but as you know can't get in until May of next year. She saw you at  back in July for syncope and migraine w/ aura. She was just discharged from  at the beginning of this mth. Please advise.

## 2024-09-30 ENCOUNTER — HOSPITAL ENCOUNTER (OUTPATIENT)
Dept: RADIOLOGY | Facility: HOSPITAL | Age: 49
Discharge: HOME | End: 2024-09-30
Payer: COMMERCIAL

## 2024-09-30 VITALS — BODY MASS INDEX: 41.43 KG/M2 | WEIGHT: 211 LBS | HEIGHT: 60 IN

## 2024-09-30 DIAGNOSIS — Z12.31 ENCOUNTER FOR SCREENING MAMMOGRAM FOR BREAST CANCER: ICD-10-CM

## 2024-09-30 PROCEDURE — 77063 BREAST TOMOSYNTHESIS BI: CPT | Performed by: RADIOLOGY

## 2024-09-30 PROCEDURE — 77067 SCR MAMMO BI INCL CAD: CPT | Performed by: RADIOLOGY

## 2024-09-30 PROCEDURE — 77067 SCR MAMMO BI INCL CAD: CPT

## 2024-10-04 ENCOUNTER — HOSPITAL ENCOUNTER (OUTPATIENT)
Dept: RADIOLOGY | Facility: EXTERNAL LOCATION | Age: 49
Discharge: HOME | End: 2024-10-04

## 2024-10-09 ENCOUNTER — TELEPHONE (OUTPATIENT)
Dept: PRIMARY CARE | Facility: CLINIC | Age: 49
End: 2024-10-09
Payer: COMMERCIAL

## 2024-10-09 NOTE — TELEPHONE ENCOUNTER
Called cannot do peer to peer. Denied 10/01/2024, 5 days to do peer to peer. Reference # [id number]  Transferred to appeal.  Discussed patient with recurrent diverticulitis. 3 admissions this year for diverticulitis.   Notes from most recent admission to [facility] indicate RUQ and LLQ pain. High wbc 15,000 with left shift. CT multi-focal acute diverticulitis.   Patient presented to office on 9/23/2024-complained of continued pain epigastric and LLQ despite completing antibiotic. Advised CT abd/pelvis as symptoms refractory to appropriate antibiotic treatment. CT to evaluate if still active diverticulitis and/or abscess.

## 2024-10-09 NOTE — TELEPHONE ENCOUNTER
----- Message from HerMeenu CORNELIUS sent at 10/9/2024  1:05 PM EDT -----  The # which is located on the denial [phone]  Tracking # [id number]  ----- Message -----  From: Claire Nicole MD  Sent: 10/8/2024   4:59 PM EDT  To: HerMeenu Armstrong    What is the number to call, tracking number, patient ID or any other information I need to set up the PCP. I called a number that was in the chart under media-transferred to [place] [phone], it is an automated system asking for first 3 letters of patient's last name. I have entered it several times and it just keeps repeating 'don't understand that'.  ----- Message -----  From: Oskar Armstrong  Sent: 10/7/2024  12:49 PM EDT  To: Claire Nicole MD    Afternoon Dr. Nicole,    Yes the CT was completed however this was deemed as a STAT order. So we were not able to deny services due to that. However the scan is now denied and a P2p will still need to be completed. In order for the PT to not be charged for services.     Thanks,    Eladia

## 2024-10-28 ENCOUNTER — APPOINTMENT (OUTPATIENT)
Dept: PRIMARY CARE | Facility: CLINIC | Age: 49
End: 2024-10-28
Payer: COMMERCIAL

## 2024-10-30 ENCOUNTER — APPOINTMENT (OUTPATIENT)
Dept: OBSTETRICS AND GYNECOLOGY | Facility: CLINIC | Age: 49
End: 2024-10-30
Payer: COMMERCIAL

## 2024-10-31 DIAGNOSIS — J45.50 SEVERE PERSISTENT ASTHMA WITHOUT COMPLICATION (MULTI): Primary | ICD-10-CM

## 2024-11-01 RX ORDER — CETIRIZINE HYDROCHLORIDE 10 MG/1
TABLET ORAL
Qty: 30 TABLET | Refills: 11 | Status: SHIPPED | OUTPATIENT
Start: 2024-11-01

## 2024-11-01 NOTE — PROGRESS NOTES
Division of Minimally Invasive Gynecologic Surgery  [facility]    24 Gynecology Visit    CC:   Chief Complaint   Patient presents with    New Patient Visit     NPV- AUB   07/15/24- 2wks ago    Chaperone Declined: GARRETT Wesleyanoop Silver is a 49 y.o. who presents for evaluation of AUB and an adnexal cyst.     Pt was recently treated for diverticulitis for which she has a CT scan. CT showed a stable 3.6 cm left adnexal mass and she was referred for further discussion.   Pt is perimenopausal . Menarche at 8. Periods were previously regular with light bleeding lasting 3 days but have become irregular over the past 3 years, but has never gone a full year with no periods. Bleeding would happen every 3-4 months.   Had bleeding daily for 3 months that started  and stopped 3 weeks ago. Bleeding was light/spotting but just persistent. Never heavy enough to require a tampon. Blood was dark brown with small clots. Patient has noted recent cold spells, but denies hot flashes, night sweats.     Imagin TVUS-possible adenomyosis  Labs: H/H 13.2/39.3   TSH wnl    GYN Hx  Gynecologic history:  Contraception/menstrual regulation: none  Desires Childbearing: denies  Last pap: NILM   Her last mammogram was BI-RADS 2 in .   Colonoscopy: completed   Denies family history of breast, ovarian, uterine, colon or endometrial cancer.       OBHx: c/s x2  Pertinent PMH: asthma, H/o NSTEMI listed-pt adamantly denies, Diverticulitis, h/o PE following knee surgery  Pertient PSH:   - endometriosis: lpsc surgery x 3  - c/sx2  - umbilical hernia repair with mesh  - breast reduction    PMHx, PSHx, SHx, Allergies, and Medications updated in Epic.  Past Medical History:   Diagnosis Date    Acute non-ST elevation myocardial infarction (NSTEMI) (Multi) 2018    Asthma     Contact with and (suspected) exposure to covid-19 2023    Cough, unspecified  2022    Disease due to severe acute respiratory syndrome coronavirus 2 (SARS-CoV-2) 2024    Edema 2024    Edema of both lower extremities 2024    Essential (primary) hypertension 09/10/2022    Personal history of COVID-19 2022    Personal history of other diseases of the respiratory system     History of acute bronchitis    Plantar fasciitis, left 2018    Sprain of left foot 04/10/2018    Sprain of medial collateral ligament of knee 2020     Past Surgical History:   Procedure Laterality Date    BREAST SURGERY  2014    Breast Surgery Reduction Procedure     SECTION, CLASSIC  2014     Section    ESOPHAGOGASTRODUODENOSCOPY  2014    Diagnostic Esophagogastroduodenoscopy    LAPAROSCOPY DIAGNOSTIC / BIOPSY / ASPIRATION / LYSIS  2014    Exploratory Laparoscopy    OTHER SURGICAL HISTORY  2020    Complete colonoscopy    UMBILICAL HERNIA REPAIR  2014    Umbilical Hernia Repair     Allergies   Allergen Reactions    Opioids - Morphine Analogues Nausea/vomiting       Current Outpatient Medications:     cetirizine (ZyrTEC) 10 mg tablet, TAKE 1 TABLET BY MOUTH ONCE DAILY AS NEEDED FOR ALLERGIES, Disp: 30 tablet, Rfl: 11    cholecalciferol (Vitamin D3) 1,250 mcg (50,000 unit) tablet, 1 po a week for 8 weeks. After completing take otc vitamin D 2000 units a day., Disp: 8 tablet, Rfl: 0    cyanocobalamin (Vitamin B-12) 1,000 mcg tablet, Take 1 tablet (1,000 mcg) by mouth once daily., Disp: , Rfl:     diphenhydrAMINE (BENADryl) 25 mg capsule, Take 1 capsule (25 mg) by mouth if needed for other (migraine) for up to 5 days., Disp: 5 capsule, Rfl: 0    escitalopram (Lexapro) 5 mg tablet, Take 1 tablet (5 mg) by mouth once daily., Disp: 30 tablet, Rfl: 2    fluticasone propion-salmeteroL (Advair Diskus) 500-50 mcg/dose diskus inhaler, Inhale 1 puff 2 times a day. Rinse mouth with water after use to reduce aftertaste and incidence of candidiasis.  Do not swallow., Disp: 60 each, Rfl: 11    ipratropium-albuteroL (Duo-Neb) 0.5-2.5 mg/3 mL nebulizer solution, Take 3 mL by nebulization 4 times a day as needed for wheezing., Disp: 720 mL, Rfl: 3    magnesium oxide 500 mg magnesium tablet, Take 1 tablet (500 mg) by mouth once daily., Disp: , Rfl:     metoclopramide (Reglan) 10 mg tablet, Take 1 tablet (10 mg) by mouth if needed (nausea) for up to 5 days., Disp: 5 tablet, Rfl: 0    montelukast (Singulair) 10 mg tablet, Take 1 tablet (10 mg) by mouth once daily., Disp: 90 tablet, Rfl: 3    ondansetron (Zofran) 8 mg tablet, Take 1 tablet (8 mg) by mouth every 8 hours if needed for nausea (headache)., Disp: 30 tablet, Rfl: 1    pantoprazole (ProtoNix) 40 mg EC tablet, Take 1 tablet (40 mg) by mouth 2 times a day. (Patient taking differently: Take 2 tablets (80 mg) by mouth once daily in the morning. Take before meals.), Disp: 60 tablet, Rfl: 5    rizatriptan (Maxalt) 10 mg tablet, Take 1 tablet (10 mg) by mouth 1 time if needed for migraine. May repeat in 2 hours if unresolved. Do not exceed 30 mg in 24 hours., Disp: 30 tablet, Rfl: 3    Spiriva Respimat 2.5 mcg/actuation inhaler, Inhale 2 puffs once daily., Disp: 1 each, Rfl: 11    topiramate (Topamax) 25 mg tablet, 2 po qam and 1 po qhs, Disp: 270 tablet, Rfl: 1  Social History     Socioeconomic History    Marital status:      Spouse name: Not on file    Number of children: Not on file    Years of education: Not on file    Highest education level: Not on file   Occupational History    Not on file   Tobacco Use    Smoking status: Never    Smokeless tobacco: Never   Vaping Use    Vaping status: Never Used   Substance and Sexual Activity    Alcohol use: Yes     Comment: very rarely    Drug use: Never    Sexual activity: Not on file   Other Topics Concern    Not on file   Social History Narrative    Not on file     Social Drivers of Health     Financial Resource Strain: Low Risk  (9/7/2024)    Overall Financial  Resource Strain (CARDIA)     Difficulty of Paying Living Expenses: Not hard at all   Food Insecurity: Not on file   Transportation Needs: No Transportation Needs (2024)    PRAPARE - Transportation     Lack of Transportation (Medical): No     Lack of Transportation (Non-Medical): No   Physical Activity: Not on file   Stress: Not on file   Social Connections: Not on file   Intimate Partner Violence: Not on file   Housing Stability: Low Risk  (2024)    Housing Stability Vital Sign     Unable to Pay for Housing in the Last Year: No     Number of Times Moved in the Last Year: 1     Homeless in the Last Year: No     Family History   Problem Relation Name Age of Onset    HOLLY disease Maternal Grandfather      Newman's esophagus Maternal Grandfather      Asthma Other Aunt      OB History          2    Para   2    Term   2            AB        Living             SAB        IAB        Ectopic        Multiple        Live Births                        ROS: reviewed and negative    PE:/66   Ht 1.524 m (5')   Wt 90.7 kg (200 lb)   BMI 39.06 kg/m²   Gen: NAD  Psych: AAOx3  Skin: no lesions  Pulm: nonlabored breathing, normal respiratory effort  Cards: normal peripheral perfusion  Lymph: no LAD in axilla or groin  GI: soft, non-tender, non-distended, no rebound/guarding, no masses  : deferred      A/P: Rema Silver is a 49 y.o. with AUB and incidental left adnexal cyst    AUB  - -Reviewed the differential diagnosis for AUB with the patient.   -TVUS ordered to evaluate for structural causes of AUB.   - will need EMB and pap. Will defer until after TVUS  -Reviewed the options for treatment of AUB which includes expectant management with strict precautions, hormonal management (Discussed various forms of hormonal management including OCPs, patch, ring, deop-provera, Nexplanon, and IUD. Contraindications, proper use, side effects reviewed), and surgical treatment including hysterectomy for  definitive management. Given her medical history, the patient is not a good candidate for estrogen containing therapies.     Ovarian cyst  Discussed with patient the natural course of functional ovarian cysts. Reviewed that there is often a normal physiologic development of cysts in young, premenopausal women. The majority of these cysts will resolve over time and do not require surgical intervention unless there are concerning features, persistent pain or concern for rupture/torsion. Small cysts 3-5 cm, typically do not require follow up, while cysts>5 cm are managed with surveillance, and surgical intervention based on size alone is not required until the cyst is >10 cm.  -TVUS to further characterize cyst    RTC after ultrasound    Bita Pugh MD  Division of Minimally Invasive Gynecologic Surgery  [facility]

## 2024-11-05 ENCOUNTER — APPOINTMENT (OUTPATIENT)
Dept: OBSTETRICS AND GYNECOLOGY | Facility: CLINIC | Age: 49
End: 2024-11-05
Payer: COMMERCIAL

## 2024-11-05 VITALS
HEIGHT: 60 IN | WEIGHT: 200 LBS | SYSTOLIC BLOOD PRESSURE: 114 MMHG | DIASTOLIC BLOOD PRESSURE: 66 MMHG | BODY MASS INDEX: 39.27 KG/M2

## 2024-11-05 DIAGNOSIS — N93.9 ABNORMAL UTERINE BLEEDING UNRELATED TO MENSTRUAL CYCLE: ICD-10-CM

## 2024-11-05 DIAGNOSIS — N94.9 ADNEXAL CYST: ICD-10-CM

## 2024-11-05 PROCEDURE — 99204 OFFICE O/P NEW MOD 45 MIN: CPT | Performed by: STUDENT IN AN ORGANIZED HEALTH CARE EDUCATION/TRAINING PROGRAM

## 2024-11-05 PROCEDURE — 3008F BODY MASS INDEX DOCD: CPT | Performed by: STUDENT IN AN ORGANIZED HEALTH CARE EDUCATION/TRAINING PROGRAM

## 2024-11-05 PROCEDURE — 1036F TOBACCO NON-USER: CPT | Performed by: STUDENT IN AN ORGANIZED HEALTH CARE EDUCATION/TRAINING PROGRAM

## 2024-11-05 ASSESSMENT — PAIN SCALES - GENERAL: PAINLEVEL_OUTOF10: 0-NO PAIN

## 2024-11-06 ENCOUNTER — APPOINTMENT (OUTPATIENT)
Dept: PRIMARY CARE | Facility: CLINIC | Age: 49
End: 2024-11-06
Payer: COMMERCIAL

## 2024-11-06 VITALS
OXYGEN SATURATION: 96 % | HEART RATE: 81 BPM | SYSTOLIC BLOOD PRESSURE: 110 MMHG | TEMPERATURE: 97.2 F | DIASTOLIC BLOOD PRESSURE: 70 MMHG | BODY MASS INDEX: 39.54 KG/M2 | HEIGHT: 60 IN | WEIGHT: 201.4 LBS

## 2024-11-06 DIAGNOSIS — F32.A DEPRESSION, UNSPECIFIED DEPRESSION TYPE: ICD-10-CM

## 2024-11-06 DIAGNOSIS — L98.9 ABNORMALITY, SKIN: ICD-10-CM

## 2024-11-06 DIAGNOSIS — R19.7 DIARRHEA, UNSPECIFIED TYPE: Primary | ICD-10-CM

## 2024-11-06 PROBLEM — N76.0 BACTERIAL VAGINITIS: Status: RESOLVED | Noted: 2023-10-16 | Resolved: 2024-11-06

## 2024-11-06 PROBLEM — B96.89 BACTERIAL VAGINITIS: Status: RESOLVED | Noted: 2023-10-16 | Resolved: 2024-11-06

## 2024-11-06 PROBLEM — J45.51 SEVERE PERSISTENT ASTHMA WITH EXACERBATION (MULTI): Status: RESOLVED | Noted: 2023-12-06 | Resolved: 2024-11-06

## 2024-11-06 PROCEDURE — 1036F TOBACCO NON-USER: CPT | Performed by: INTERNAL MEDICINE

## 2024-11-06 PROCEDURE — 3008F BODY MASS INDEX DOCD: CPT | Performed by: INTERNAL MEDICINE

## 2024-11-06 PROCEDURE — 99214 OFFICE O/P EST MOD 30 MIN: CPT | Performed by: INTERNAL MEDICINE

## 2024-11-06 RX ORDER — ESCITALOPRAM OXALATE 5 MG/1
5 TABLET ORAL DAILY
Qty: 90 TABLET | Refills: 1 | Status: SHIPPED | OUTPATIENT
Start: 2024-11-06

## 2024-11-06 ASSESSMENT — PATIENT HEALTH QUESTIONNAIRE - PHQ9
1. LITTLE INTEREST OR PLEASURE IN DOING THINGS: NOT AT ALL
SUM OF ALL RESPONSES TO PHQ9 QUESTIONS 1 & 2: 0
2. FEELING DOWN, DEPRESSED OR HOPELESS: NOT AT ALL

## 2024-11-06 ASSESSMENT — PAIN SCALES - GENERAL: PAINLEVEL_OUTOF10: 0-NO PAIN

## 2024-11-06 NOTE — PROGRESS NOTES
"Standard Progress Note  Chief Complaint   Patient presents with    Follow-up     Pt here for 1 mo FUV.  Pt states \"My boyfriend noticed a black spot on the back of my right thigh that is apparently getting bigger.  I would like that looked at and possible a referral to dermatology.\"     49 year old woman last visit 2024.  Accompanied by daughter Tia.   fatty liver  [facility]- [facility]   Admission at [facility].   2024.  Thought to be a headache.  back of her neck basically from her lower scalp her neck and upper thoracic feels like it is being stretched.  She reports that her asthma is very severe and life-threatening.  She sees a pulmonologist at [facility].  She states that she almost  several times  Headaches-topamax dose increased.  Topamax increased from 25 mg bid to 50 qam and 25 mg qpm  Started on lexapro 5 mg for depression.   3rd admission this year,  3rd lifetime flare of  diverticulitis-had CT after last visit.    Daughter came with her today to help as patient having trouble comprehending things.  Has appt with neurologist but for next year.     Has trouble with reading directions and following them. This is new.   Headaches much better now. Had bad headache yesterday. 1st headache in the last 2-3 months.    Depression better with lexapro 5 mg. Would like to stay on same dose    Boyfriend noticed dark spot right posterior thigh about a year ago. Has increased in size in the last  6 months. She is not able to see the area.     Lot of GI issues. Can only eat a few types of foods. Lost 20 lbs. Having watery diarrhea.   Discomfort/pain LLQ and across upper abdomen.      Saw gynecologist DR Pugh yesterday. Recommends her. Will have pelvic ultrasound to evaluate ovarian cyst.         Patient Active Problem List   Diagnosis    Abdominal pain, epigastric    Abnormality, skin    Arthritis    Tendonitis    Allergic rhinitis    Ankle sprain    Asthma    Sleep apnea    Chronic " constipation    Diarrhea    Change in bowel habits    Calcaneal spur of left foot    Calcaneal spur    Dyspepsia    Diverticulitis of colon    Chronic fatigue syndrome    Insomnia    Hypoglycemia    Hyperlipidemia    Hyperglycemia    Hiatal hernia    GERD (gastroesophageal reflux disease)    Fibromyalgia    Muscle tension dysphonia    Vocal cord disease    Lesion of neck    Irritable larynx    Severe persistent asthma without complication (Multi)    Pain of left heel    Vasovagal symptom    Venous insufficiency (chronic) (peripheral)    Vitamin D deficiency    Vocal cord dysfunction    Atypical chest pain    Lower extremity edema    Feeling weak    Heel spur, right    Shortness of breath    Peripheral eosinophilia    Diverticulitis    Localized, primary osteoarthritis    Abnormal uterine bleeding unrelated to menstrual cycle    Adnexal cyst     Blood pressure 110/70, pulse 81, temperature 36.2 °C (97.2 °F), height 1.524 m (5'), weight 91.4 kg (201 lb 6.4 oz), SpO2 96%.  Body mass index is 39.33 kg/m².    Vital reviewed  Neck: no cervical/clavicular adenopathy  CV: RRR S1 S2 normal. No murmur. No carotid bruit.   Lungs: CTA without wrr. Breath sounds symmetric  Abdomen: normoactive. Soft, +epigastric and lower abdominal tenderness. No guarding or rebound.  No mass.  Extremities: no pretibial edema  Neuro: speech intact.   Skin: right posterior thigh very dark brown/black appearing flat mole. Well delineated.     Reviewed labs D, ferritin, iron, A1c, lipid, cmp  Glucose elevated 114 but A1c within goal at 5.4  D low-taking vitamin D    Lab Results   Component Value Date    GLUCOSE 114 (H) 09/23/2024    CALCIUM 9.4 09/23/2024     09/23/2024    K 4.4 09/23/2024    CO2 25 09/23/2024     (H) 09/23/2024    BUN 12 09/23/2024    CREATININE 0.81 09/23/2024      Lab Results   Component Value Date    WBC 9.0 09/23/2024    HGB 13.2 09/23/2024    HCT 39.3 09/23/2024    MCV 92 09/23/2024     09/23/2024      Lab  "Results   Component Value Date    CHOL 221 (H) 09/23/2024     Lab Results   Component Value Date    HDL 52.0 09/23/2024     Lab Results   Component Value Date    LDLCALC 126 (H) 09/23/2024     Lab Results   Component Value Date    TRIG 213 (H) 09/23/2024     No components found for: \"CHOLHDL\"    07/2024 CT angio: no PE. Diffuse hepatic steatosis.   07/2024 CT head without contrast: no acute  7/2024 MRI brain with and without contrast: no acute infarct, mass, or midline shift    1. Diarrhea, unspecified type (Primary)  Sent message to GI Dr Strong-suspect pt may need scopes.   - C. difficile, PCR; Future  - Stool Pathogen Panel, PCR  - Ova/Para + Giardia/Cryptosporidium Antigen; Future    2. Depression, unspecified depression type  - escitalopram (Lexapro) 5 mg tablet; Take 1 tablet (5 mg) by mouth once daily.  Dispense: 90 tablet; Refill: 1    3. Abnormality, skin  Given color and change in size recommend see dermatologist.   - Referral to Dermatology    Headaches improved-continue topamax at current dose  Trouble with concentration-reviewed MRI done 2024 unremarkable. Could be residual from depressive symptoms that have not yet responded to lexapro.   Patient also willem-menopausal.  Has appt with neurologist next year.     F/up 1 month   Mammogram 09/2024.   Pap 9/23/2020-reviewed report including hpv testing-normal. Now has gyn Dr Pugh.   Colonoscopy 9/2021 Tubular adenoma Dr Powell.     Current Outpatient Medications on File Prior to Visit   Medication Sig Dispense Refill    cetirizine (ZyrTEC) 10 mg tablet TAKE 1 TABLET BY MOUTH ONCE DAILY AS NEEDED FOR ALLERGIES 30 tablet 11    cholecalciferol (Vitamin D3) 1,250 mcg (50,000 unit) tablet 1 po a week for 8 weeks. After completing take otc vitamin D 2000 units a day. 8 tablet 0    cyanocobalamin (Vitamin B-12) 1,000 mcg tablet Take 1 tablet (1,000 mcg) by mouth once daily.      diphenhydrAMINE (BENADryl) 25 mg capsule Take 1 capsule (25 mg) by mouth if needed " for other (migraine) for up to 5 days. 5 capsule 0    escitalopram (Lexapro) 5 mg tablet Take 1 tablet (5 mg) by mouth once daily. 30 tablet 2    fluticasone propion-salmeteroL (Advair Diskus) 500-50 mcg/dose diskus inhaler Inhale 1 puff 2 times a day. Rinse mouth with water after use to reduce aftertaste and incidence of candidiasis. Do not swallow. 60 each 11    ipratropium-albuteroL (Duo-Neb) 0.5-2.5 mg/3 mL nebulizer solution Take 3 mL by nebulization 4 times a day as needed for wheezing. 720 mL 3    magnesium oxide 500 mg magnesium tablet Take 1 tablet (500 mg) by mouth once daily.      montelukast (Singulair) 10 mg tablet Take 1 tablet (10 mg) by mouth once daily. 90 tablet 3    ondansetron (Zofran) 8 mg tablet Take 1 tablet (8 mg) by mouth every 8 hours if needed for nausea (headache). 30 tablet 1    pantoprazole (ProtoNix) 40 mg EC tablet Take 1 tablet (40 mg) by mouth 2 times a day. (Patient taking differently: Take 2 tablets (80 mg) by mouth early in the morning..) 60 tablet 5    rizatriptan (Maxalt) 10 mg tablet Take 1 tablet (10 mg) by mouth 1 time if needed for migraine. May repeat in 2 hours if unresolved. Do not exceed 30 mg in 24 hours. 30 tablet 3    Spiriva Respimat 2.5 mcg/actuation inhaler Inhale 2 puffs once daily. 1 each 11    topiramate (Topamax) 25 mg tablet 2 po qam and 1 po qhs 270 tablet 1    metoclopramide (Reglan) 10 mg tablet Take 1 tablet (10 mg) by mouth if needed (nausea) for up to 5 days. 5 tablet 0    [DISCONTINUED] cetirizine (ZyrTEC) 10 mg tablet Take 1 tablet (10 mg) by mouth once daily.       No current facility-administered medications on file prior to visit.         Claire Duron MD

## 2024-11-14 ENCOUNTER — LAB (OUTPATIENT)
Dept: LAB | Facility: LAB | Age: 49
End: 2024-11-14
Payer: COMMERCIAL

## 2024-11-14 DIAGNOSIS — R19.7 DIARRHEA, UNSPECIFIED TYPE: Primary | ICD-10-CM

## 2024-11-14 DIAGNOSIS — R19.7 DIARRHEA, UNSPECIFIED TYPE: ICD-10-CM

## 2024-11-14 LAB — C DIF TOX TCDA+TCDB STL QL NAA+PROBE: NOT DETECTED

## 2024-11-14 PROCEDURE — 87493 C DIFF AMPLIFIED PROBE: CPT | Mod: 59,AHULAB | Performed by: INTERNAL MEDICINE

## 2024-11-14 PROCEDURE — 87506 IADNA-DNA/RNA PROBE TQ 6-11: CPT | Mod: AHULAB | Performed by: INTERNAL MEDICINE

## 2024-11-14 PROCEDURE — 87328 CRYPTOSPORIDIUM AG IA: CPT

## 2024-11-14 PROCEDURE — 87329 GIARDIA AG IA: CPT

## 2024-11-15 ENCOUNTER — HOSPITAL ENCOUNTER (OUTPATIENT)
Dept: RADIOLOGY | Facility: CLINIC | Age: 49
Discharge: HOME | End: 2024-11-15
Payer: COMMERCIAL

## 2024-11-15 DIAGNOSIS — N93.9 ABNORMAL UTERINE BLEEDING UNRELATED TO MENSTRUAL CYCLE: ICD-10-CM

## 2024-11-15 DIAGNOSIS — N94.9 ADNEXAL CYST: ICD-10-CM

## 2024-11-15 LAB

## 2024-11-15 PROCEDURE — 76856 US EXAM PELVIC COMPLETE: CPT

## 2024-11-17 LAB
CRYPTOSP AG STL QL IA: NEGATIVE
G LAMBLIA AG STL QL IA: NEGATIVE

## 2024-11-23 LAB — O+P STL MICRO: NEGATIVE

## 2024-11-29 DIAGNOSIS — K21.9 GASTROESOPHAGEAL REFLUX DISEASE WITHOUT ESOPHAGITIS: ICD-10-CM

## 2024-12-02 NOTE — PROGRESS NOTES
Subjective   Patient ID: Rema Silver is a 49 y.o. female who presents for Follow-up (09/07 ED follow up diverticulitis. Pt states after 2 rounds of ATB, she does not feel better. She continues to lose weight/abdominal pain when eating/not able to eat a full meal yet. ).  HPI  Change in bowel habits.   She has constipation.   Is on long standing prednisone for asthma was on it for close of 6 months.     Also has h/o diverticulitis.     Symptoms:   Abdominal pain : LLQ and Epigastric pain :   Couple of times a week. Cna stay up to hours.     2. Constipation: She avoid veggies,   Can tolerate baked foods. One meal a day.     3. Fatty liver : On CT scan.     4. : GERD with esophagitis : on pantoprazole 40mg PO BID.           Last Colonoscopy was in 2021: 1 polyp,. Repeat in 7 years.   EGD 2017: GERD with esophagitis.   Current Outpatient Medications on File Prior to Visit   Medication Sig Dispense Refill    cetirizine (ZyrTEC) 10 mg tablet TAKE 1 TABLET BY MOUTH ONCE DAILY AS NEEDED FOR ALLERGIES 30 tablet 11    cholecalciferol (Vitamin D3) 1,250 mcg (50,000 unit) tablet 1 po a week for 8 weeks. After completing take otc vitamin D 2000 units a day. 8 tablet 0    cyanocobalamin (Vitamin B-12) 1,000 mcg tablet Take 1 tablet (1,000 mcg) by mouth once daily.      diphenhydrAMINE (BENADryl) 25 mg capsule Take 1 capsule (25 mg) by mouth if needed for other (migraine) for up to 5 days. 5 capsule 0    escitalopram (Lexapro) 5 mg tablet Take 1 tablet (5 mg) by mouth once daily. 90 tablet 1    fluticasone propion-salmeteroL (Advair Diskus) 500-50 mcg/dose diskus inhaler Inhale 1 puff 2 times a day. Rinse mouth with water after use to reduce aftertaste and incidence of candidiasis. Do not swallow. 60 each 11    ipratropium-albuteroL (Duo-Neb) 0.5-2.5 mg/3 mL nebulizer solution Take 3 mL by nebulization 4 times a day as needed for wheezing. 720 mL 3    magnesium oxide 500 mg magnesium tablet Take 1 tablet (500 mg) by mouth  once daily.      montelukast (Singulair) 10 mg tablet Take 1 tablet (10 mg) by mouth once daily. 90 tablet 3    ondansetron (Zofran) 8 mg tablet Take 1 tablet (8 mg) by mouth every 8 hours if needed for nausea (headache). 30 tablet 1    rizatriptan (Maxalt) 10 mg tablet Take 1 tablet (10 mg) by mouth 1 time if needed for migraine. May repeat in 2 hours if unresolved. Do not exceed 30 mg in 24 hours. 30 tablet 3    Spiriva Respimat 2.5 mcg/actuation inhaler Inhale 2 puffs once daily. 1 each 11    topiramate (Topamax) 25 mg tablet 2 po qam and 1 po qhs 270 tablet 1    [DISCONTINUED] pantoprazole (ProtoNix) 40 mg EC tablet Take 1 tablet (40 mg) by mouth 2 times a day. (Patient taking differently: Take 2 tablets (80 mg) by mouth early in the morning..) 60 tablet 5    metoclopramide (Reglan) 10 mg tablet Take 1 tablet (10 mg) by mouth if needed (nausea) for up to 5 days. 5 tablet 0    pantoprazole (ProtoNix) 40 mg EC tablet Take 1 tablet by mouth twice daily 60 tablet 3     No current facility-administered medications on file prior to visit.        Review of Systems   Constitutional: Negative.    Respiratory: Negative.     Cardiovascular: Negative.    Gastrointestinal:  Positive for abdominal pain and constipation.   Endocrine: Negative.    Genitourinary: Negative.    Skin: Negative.    Neurological: Negative.        Objective   Physical Exam  Constitutional:       Appearance: Normal appearance.   HENT:      Head: Normocephalic and atraumatic.   Cardiovascular:      Rate and Rhythm: Normal rate and regular rhythm.      Pulses: Normal pulses.      Heart sounds: Normal heart sounds.   Pulmonary:      Effort: Pulmonary effort is normal.      Breath sounds: Normal breath sounds.   Abdominal:      General: Abdomen is flat. Bowel sounds are normal.      Palpations: Abdomen is soft.      Tenderness: There is no abdominal tenderness.   Musculoskeletal:         General: Normal range of motion.      Cervical back: Normal range of  "motion.   Skin:     General: Skin is warm.   Neurological:      Mental Status: She is alert.       /76 (BP Location: Right arm, Patient Position: Sitting, BP Cuff Size: Large adult)   Pulse 80   Resp 18   Ht 1.524 m (5')   Wt 88.5 kg (195 lb 1.6 oz)   SpO2 98%   BMI 38.10 kg/m²      Lab Results   Component Value Date    WBC 9.0 09/23/2024    HGB 13.2 09/23/2024    HCT 39.3 09/23/2024    MCV 92 09/23/2024     09/23/2024           No lab exists for component: \"LABALBU\"    No results found for: \"AFP\"  Lab Results   Component Value Date    TSH 1.10 10/20/2023     CT abdomen pelvis w IV contrast  Status: Final result     PACS Images     Show images for CT abdomen pelvis w IV contrast  Signed by    Signed Time Phone Pager   Trent Lucas MD 9/23/2024 13:31 [phone]      Exam Information    Status Exam Begun Exam Ended   Final 9/23/2024 12:04 9/23/2024 12:49     Study Result    Narrative & Impression   Interpreted By:  Trent Lucas,   STUDY:  CT ABDOMEN PELVIS W IV CONTRAST; ;  9/23/2024 12:49 pm      INDICATION:  Signs/Symptoms:see dx.      ,R10.9 Unspecified abdominal pain      COMPARISON:  09/07/2024      ACCESSION NUMBER(S):  [id number]      ORDERING CLINICIAN:  NATALIO RAMIRES      TECHNIQUE:  Serial axial CT images obtained of the abdomen pelvis following  intravenous administration 75 mL of Omnipaque 350. Images reformatted  in the coronal and sagittal projection      All CT examinations are performed with 1 or more of the following  dose reduction techniques: Automated exposure control, adjustment of  mA and/or kv according to patient's size, or use of iterative  reconstruction techniques.      FINDINGS:  Included lung bases are unremarkable. Distal esophagus is unremarkable      Liver demonstrates a subcentimeter hypodensity in the right lobe of  the liver posteriorly segment 6 identified measuring 5 mm too small  to characterize suggesting hepatic cyst.      Gallbladder is " unremarkable      Spleen is unremarkable      Adrenal glands are unremarkable      Pancreas is unremarkable      Right kidney is unremarkable.      Left kidney is unremarkable      Retroperitoneum demonstrates no lymphadenopathy. Scattered  subcentimeter lymph nodes demonstrated.      Loops of large bowel demonstrates near-complete interval resolution  of the pericolonic fat stranding about the distal descending colon  with subtle residual edema with sequela of acute diverticulitis. No  pericolonic abscess formation demonstrated. Also, the focus of  diverticulitis within the mid descending colon is intervally  resolved. No surrounding edema demonstrated. No pericolonic fluid  collection. Other scattered uncomplicated colonic diverticulosis. The  mid to distal colon is contracted limiting characterization appendix  is seen and is unremarkable. Small bowel loops are nondilated. There  is no lymphadenopathy in the mesentery. Stomach is unremarkable      CT pelvis:      Unopacified bladder is unremarkable. There is no pelvic  lymphadenopathy. No free fluid. Uterus and adnexa demonstrate left  adnexal cyst in relation to the ovary with ovarian cysts measuring  3.6 cm and on the prior examination this cyst measured 3.6 cm  unchanged.      Visualized osseous structures demonstrate moderate facet arthropathy  left L5/S1 and mild facet arthropathy bilateral L4/5.              IMPRESSION:  1. Interval resolution of diverticulitis within the mid descending  and distal descending colon with only subtle residual pericolonic  edema about the distal descending colon. No pericolonic fluid  collection identified.      2. Left adnexal hypodensity with cyst identified in the left ovary  measuring 3.6 cm unchanged.                  MACRO:  None      Signed by: Trent Lucas 9/23/2024 1:31 PM  Dictation workstation:   [id number]   Colonoscopy  Order# [id number]  Reading physician: Honey Powell MD Ordering provider: Alejandra Ward,  APRN-CNP Study date: 02/01/2023     Result Information    Status: Edited Result - FINAL (Collected: 9/24/2021 09:39) Provider Status: Ordered     Result Text    Result Text   Patient Name: Rema Silver  Procedure Date: 9/24/2021 9:39 AM  MRN: [id number]  Account Number: [id number]  YOB: 1975  Site: [facility] Procedure Room 5  Ethnicity: Not  or   Race: White  Attending MD: Honey Powell MD, [id number]  Procedure:             Colonoscopy  Indications:           Change in bowel habits  Comorbidities       h/o diverticulitis  Patient Profile:       This is a 46 year old female.  Providers:             Honey Powell MD (Doctor) Gastroenterology, Leticia Vargas RN (Nurse) , Delon Porter RN (Nurse)  Referring MD:          Alejandra Ward CNP  Medicines:             Midazolam 2 mg IV, Meperidine 50 mg IV  Complications:         No immediate complications.  Procedure:             Pre-Anesthesia Assessment:                         - Prior to the procedure, a History and Physical was                          performed, and patient medications and allergies were                          reviewed. The patient's tolerance of previous                          anesthesia was also reviewed. The risks and benefits                          of the procedure and the sedation options and risks                          were discussed with the patient. All questions were                          answered, and informed consent was obtained. Prior                          Anticoagulants: The patient has taken no anticoagulant                          or antiplatelet agents. ASA Grade Assessment: III - A                          patient with severe systemic disease. After reviewing                          the risks and benefits, the patient was deemed in                          satisfactory condition to undergo the procedure.                         After I obtained informed  consent, the scope was                          passed under direct vision. Throughout the procedure,                          the patient's blood pressure, pulse, and oxygen                          saturations were monitored continuously. The pediatric                          colonoscope was introduced through the anus and                          advanced to the cecum, identified by appendiceal                          orifice and ileocecal valve. The colonoscopy was                          performed without difficulty. The patient tolerated                          the procedure well. The quality of the bowel                          preparation was adequate to identify polyps 6 mm and                          larger in size.  Findings:       The perianal and digital rectal examinations were normal. Pertinent        negatives include no palpable rectal lesions.       A 5 mm polyp was found in the ascending colon. The polyp was sessile.        The polyp was removed with a cold snare. Resection and retrieval were        complete. The pathology specimen was placed into Bottle Number 1.        Estimated blood loss was minimal.       A few small-mouthed diverticula were found in the sigmoid colon.       The exam was otherwise without abnormality on direct and retroflexion        views.  Moderate Sedation:       Moderate (conscious) sedation was administered by the endoscopy nurse        and supervised by the endoscopist. The following parameters were        monitored: oxygen saturation, heart rate, blood pressure, and response        to care. Total physician intraservice time was 22 minutes.  Estimated Blood Loss:       Estimated blood loss: none.  Impression:            - One 5 mm polyp in the ascending colon, removed with                          a cold snare. Resected and retrieved.                         - Diverticulosis in the sigmoid colon.                         - The examination was otherwise normal on  direct and                          retroflexion views.  Recommendation:        - Discharge patient to home.                         - Resume previous diet.                         - Continue present medications.                         - Await pathology results.                         - Repeat colonoscopy in 7 years for surveillance.                         - Return to primary care physician as previously                          scheduled.  Attending Participation:       I personally performed the entire procedure.  Honey Powell MD  9/24/2021 10:09:48 AM  This report has been signed electronically.  Number of Addenda: 0  Note Initiated On: 9/24/2021 9:39 AM  Scope Withdrawal Time 0 hours 12 minutes 27 seconds   Total Procedure Duration Time 0 hours 17 minutes 26 seconds        Assessment/Plan   Diagnoses and all orders for this visit:  Gastroesophageal reflux disease with esophagitis without hemorrhage  And ongoing issues with asthma control, we will schedule her for upper endoscopy with Bravo pH monitoring .  Fatty liver  -     Fibroscan. Referral to Gastroenterology  Chronic constipation  -     linaCLOtide (Linzess) 145 mcg capsule; Take 1 capsule (145 mcg) by mouth once daily in the morning. Take before meals. Do not crush or chew.  We will schedule you for EGD with BRAVO.   Metamucil or benefiber 1-2 tablespoon daily.   Low FODMAP diet.   Follow up in 4 months.

## 2024-12-12 ENCOUNTER — APPOINTMENT (OUTPATIENT)
Dept: GASTROENTEROLOGY | Facility: CLINIC | Age: 49
End: 2024-12-12
Payer: COMMERCIAL

## 2024-12-12 VITALS
RESPIRATION RATE: 18 BRPM | BODY MASS INDEX: 38.31 KG/M2 | DIASTOLIC BLOOD PRESSURE: 76 MMHG | WEIGHT: 195.1 LBS | OXYGEN SATURATION: 98 % | SYSTOLIC BLOOD PRESSURE: 104 MMHG | HEART RATE: 80 BPM | HEIGHT: 60 IN

## 2024-12-12 DIAGNOSIS — K76.0 FATTY LIVER: ICD-10-CM

## 2024-12-12 DIAGNOSIS — K59.09 CHRONIC CONSTIPATION: ICD-10-CM

## 2024-12-12 DIAGNOSIS — K21.00 GASTROESOPHAGEAL REFLUX DISEASE WITH ESOPHAGITIS WITHOUT HEMORRHAGE: Primary | ICD-10-CM

## 2024-12-12 PROCEDURE — 3008F BODY MASS INDEX DOCD: CPT | Performed by: INTERNAL MEDICINE

## 2024-12-12 PROCEDURE — 99205 OFFICE O/P NEW HI 60 MIN: CPT | Performed by: INTERNAL MEDICINE

## 2024-12-12 NOTE — PATIENT INSTRUCTIONS
Gastroesophageal reflux disease with esophagitis without hemorrhage  Fatty liver  -     Referral to Gastroenterology  Chronic constipation  -     linaCLOtide (Linzess) 145 mcg capsule; Take 1 capsule (145 mcg) by mouth once daily in the morning. Take before meals. Do not crush or chew.  We will schedule you for EGD with BRAVO.   Metamucil or benefiber 1-2 tablespoon daily.   Low FODMAP diet.   Follow up in 3 months.

## 2024-12-16 RX ORDER — PANTOPRAZOLE SODIUM 40 MG/1
40 TABLET, DELAYED RELEASE ORAL 2 TIMES DAILY
Qty: 60 TABLET | Refills: 3 | Status: SHIPPED | OUTPATIENT
Start: 2024-12-16

## 2024-12-16 ASSESSMENT — ENCOUNTER SYMPTOMS
CONSTIPATION: 1
ABDOMINAL PAIN: 1
CARDIOVASCULAR NEGATIVE: 1
ENDOCRINE NEGATIVE: 1
NEUROLOGICAL NEGATIVE: 1
RESPIRATORY NEGATIVE: 1
CONSTITUTIONAL NEGATIVE: 1

## 2024-12-25 NOTE — PROGRESS NOTES
Chief Complaint   Patient presents with    Follow-up     Pt here for 6 week FUV     49 year old woman last visit 11/2024.  Accompanied by daughter Tia.   fatty liver  [facility]- lymphadema -has pump. Helps. Cannot tolerate compression stocking but has compression leggings.   Headaches-topamax dose increased.  Topamax   Depression. lexapro  3rd admission this year,  3rd lifetime flare of  diverticulitis-had CT after last visit.  Still having constipation. Waiting for a week with less travel so can try the linzess  Cough few days. Thick white sputum. Intermittent sore throat.     Patient Active Problem List   Diagnosis    Abdominal pain, epigastric    Abnormality, skin    Arthritis    Tendonitis    Allergic rhinitis    Ankle sprain    Asthma    Sleep apnea    Chronic constipation    Diarrhea    Change in bowel habits    Calcaneal spur of left foot    Calcaneal spur    Dyspepsia    Diverticulitis of colon    Chronic fatigue syndrome    Insomnia    Hypoglycemia    Hyperlipidemia    Hyperglycemia    Hiatal hernia    GERD (gastroesophageal reflux disease)    Fibromyalgia    Muscle tension dysphonia    Vocal cord disease    Lesion of neck    Irritable larynx    Severe persistent asthma without complication (Multi)    Pain of left heel    Vasovagal symptom    Venous insufficiency (chronic) (peripheral)    Vitamin D deficiency    Vocal cord dysfunction    Atypical chest pain    Lower extremity edema    Feeling weak    Heel spur, right    Shortness of breath    Peripheral eosinophilia    Diverticulitis    Localized, primary osteoarthritis    Abnormal uterine bleeding unrelated to menstrual cycle    Adnexal cyst     Blood pressure 122/68, pulse 93, temperature 36.2 °C (97.2 °F), height 1.524 m (5'), weight 90.4 kg (199 lb 6.4 oz), SpO2 98%.  Body mass index is 38.94 kg/m².    Vital reviewed  Neck: no cervical/clavicular adenopathy  Throat +white exudate right posterior pharynx.   CV: RRR S1 S2 normal. No murmur.  "No carotid bruit.   Lungs: CTA without wrr. Breath sounds symmetric  Neuro: speech intact.     Reviewed labs D, ferritin, iron, A1c, lipid, cmp  Glucose elevated 114 but A1c within goal at 5.4  D low-taking vitamin D    Lab Results   Component Value Date    GLUCOSE 114 (H) 09/23/2024    CALCIUM 9.4 09/23/2024     09/23/2024    K 4.4 09/23/2024    CO2 25 09/23/2024     (H) 09/23/2024    BUN 12 09/23/2024    CREATININE 0.81 09/23/2024      Lab Results   Component Value Date    WBC 9.0 09/23/2024    HGB 13.2 09/23/2024    HCT 39.3 09/23/2024    MCV 92 09/23/2024     09/23/2024      Lab Results   Component Value Date    CHOL 221 (H) 09/23/2024     Lab Results   Component Value Date    HDL 52.0 09/23/2024     Lab Results   Component Value Date    LDLCALC 126 (H) 09/23/2024     Lab Results   Component Value Date    TRIG 213 (H) 09/23/2024     No components found for: \"CHOLHDL\"    07/2024 CT angio: no PE. Diffuse hepatic steatosis.   07/2024 CT head without contrast: no acute  7/2024 MRI brain with and without contrast: no acute infarct, mass, or midline shift    1. Intractable headache, unspecified chronicity pattern, unspecified headache type  - topiramate (Topamax) 25 mg tablet; 2 po qam and 1 po qhs  Dispense: 270 tablet; Refill: 3    2. Cough, unspecified type (Primary)  - amoxicillin (Amoxil) 875 mg tablet; Take 1 tablet (875 mg) by mouth 2 times a day for 7 days.  Dispense: 14 tablet; Refill: 0    3. Asymptomatic premature menopause  - XR DEXA bone density; Future    4. High risk medication use  Has taken high dose steroids for prolonged period of time. Recommend dexa scan.   - XR DEXA bone density; Future    F/up 4 months    Mammogram 09/2024.   Pap 9/23/2020-reviewed report including hpv testing-normal. Now has gyn Dr Pugh.   Colonoscopy 9/2021 Tubular adenoma Dr Powell.     Current Outpatient Medications on File Prior to Visit   Medication Sig Dispense Refill    cetirizine (ZyrTEC) 10 mg " tablet TAKE 1 TABLET BY MOUTH ONCE DAILY AS NEEDED FOR ALLERGIES 30 tablet 11    cyanocobalamin (Vitamin B-12) 1,000 mcg tablet Take 1 tablet (1,000 mcg) by mouth once daily.      diphenhydrAMINE (BENADryl) 25 mg capsule Take 1 capsule (25 mg) by mouth if needed for other (migraine) for up to 5 days. 5 capsule 0    escitalopram (Lexapro) 5 mg tablet Take 1 tablet (5 mg) by mouth once daily. 90 tablet 1    fluticasone propion-salmeteroL (Advair Diskus) 500-50 mcg/dose diskus inhaler Inhale 1 puff 2 times a day. Rinse mouth with water after use to reduce aftertaste and incidence of candidiasis. Do not swallow. 60 each 11    ipratropium-albuteroL (Duo-Neb) 0.5-2.5 mg/3 mL nebulizer solution Take 3 mL by nebulization 4 times a day as needed for wheezing. 720 mL 3    linaCLOtide (Linzess) 145 mcg capsule Take 1 capsule (145 mcg) by mouth once daily in the morning. Take before meals. Do not crush or chew. (Patient taking differently: Take 1 capsule (145 mcg) by mouth once daily in the morning. Take before meals. Do not crush or chew.  PT HAS NOT STARTED YET) 30 capsule 1    magnesium oxide 500 mg magnesium tablet Take 1 tablet (500 mg) by mouth once daily.      montelukast (Singulair) 10 mg tablet Take 1 tablet (10 mg) by mouth once daily. 90 tablet 3    ondansetron (Zofran) 8 mg tablet Take 1 tablet (8 mg) by mouth every 8 hours if needed for nausea (headache). 30 tablet 1    pantoprazole (ProtoNix) 40 mg EC tablet Take 1 tablet by mouth twice daily 60 tablet 3    rizatriptan (Maxalt) 10 mg tablet Take 1 tablet (10 mg) by mouth 1 time if needed for migraine. May repeat in 2 hours if unresolved. Do not exceed 30 mg in 24 hours. 30 tablet 3    Spiriva Respimat 2.5 mcg/actuation inhaler Inhale 2 puffs once daily. 1 each 11    topiramate (Topamax) 25 mg tablet 2 po qam and 1 po qhs 270 tablet 1    [DISCONTINUED] cholecalciferol (Vitamin D3) 1,250 mcg (50,000 unit) tablet 1 po a week for 8 weeks. After completing take otc  vitamin D 2000 units a day. (Patient not taking: Reported on 12/26/2024) 8 tablet 0    [DISCONTINUED] metoclopramide (Reglan) 10 mg tablet Take 1 tablet (10 mg) by mouth if needed (nausea) for up to 5 days. (Patient not taking: Reported on 12/26/2024) 5 tablet 0     No current facility-administered medications on file prior to visit.         Claire Duron MD

## 2024-12-25 NOTE — H&P (VIEW-ONLY)
Chief Complaint   Patient presents with    Follow-up     Pt here for 6 week FUV     49 year old woman last visit 11/2024.  Accompanied by daughter Tia.   fatty liver  [facility]- lymph[facility]a -has pump. Helps. Cannot tolerate compression stocking but has compression leggings.   Headaches-topamax dose increased.  Topamax   Depression. lexapro  3rd admission this year,  3rd lifetime flare of  diverticulitis-had CT after last visit.  Still having constipation. Waiting for a week with less travel so can try the linzess  Cough few days. Thick white sputum. Intermittent sore throat.     Patient Active Problem List   Diagnosis    Abdominal pain, epigastric    Abnormality, skin    Arthritis    Tendonitis    Allergic rhinitis    Ankle sprain    Asthma    Sleep apnea    Chronic constipation    Diarrhea    Change in bowel habits    Calcaneal spur of left foot    Calcaneal spur    Dyspepsia    Diverticulitis of colon    Chronic fatigue syndrome    Insomnia    Hypoglycemia    Hyperlipidemia    Hyperglycemia    Hiatal hernia    GERD (gastroesophageal reflux disease)    Fibromyalgia    Muscle tension dysphonia    Vocal cord disease    Lesion of neck    Irritable larynx    Severe persistent asthma without complication (Multi)    Pain of left heel    Vasovagal symptom    Venous insufficiency (chronic) (peripheral)    Vitamin D deficiency    Vocal cord dysfunction    Atypical chest pain    Lower extremity edema    Feeling weak    Heel spur, right    Shortness of breath    Peripheral eosinophilia    Diverticulitis    Localized, primary osteoarthritis    Abnormal uterine bleeding unrelated to menstrual cycle    Adnexal cyst     Blood pressure 122/68, pulse 93, temperature 36.2 °C (97.2 °F), height 1.524 m (5'), weight 90.4 kg (199 lb 6.4 oz), SpO2 98%.  Body mass index is 38.94 kg/m².    Vital reviewed  Neck: no cervical/clavicular adenopathy  Throat +white exudate right posterior pharynx.   CV: RRR S1 S2 normal. No murmur.  "No carotid bruit.   Lungs: CTA without wrr. Breath sounds symmetric  Neuro: speech intact.     Reviewed labs D, ferritin, iron, A1c, lipid, cmp  Glucose elevated 114 but A1c within goal at 5.4  D low-taking vitamin D    Lab Results   Component Value Date    GLUCOSE 114 (H) 09/23/2024    CALCIUM 9.4 09/23/2024     09/23/2024    K 4.4 09/23/2024    CO2 25 09/23/2024     (H) 09/23/2024    BUN 12 09/23/2024    CREATININE 0.81 09/23/2024      Lab Results   Component Value Date    WBC 9.0 09/23/2024    HGB 13.2 09/23/2024    HCT 39.3 09/23/2024    MCV 92 09/23/2024     09/23/2024      Lab Results   Component Value Date    CHOL 221 (H) 09/23/2024     Lab Results   Component Value Date    HDL 52.0 09/23/2024     Lab Results   Component Value Date    LDLCALC 126 (H) 09/23/2024     Lab Results   Component Value Date    TRIG 213 (H) 09/23/2024     No components found for: \"CHOLHDL\"    07/2024 CT angio: no PE. Diffuse hepatic steatosis.   07/2024 CT head without contrast: no acute  7/2024 MRI brain with and without contrast: no acute infarct, mass, or midline shift    1. Intractable headache, unspecified chronicity pattern, unspecified headache type  - topiramate (Topamax) 25 mg tablet; 2 po qam and 1 po qhs  Dispense: 270 tablet; Refill: 3    2. Cough, unspecified type (Primary)  - amoxicillin (Amoxil) 875 mg tablet; Take 1 tablet (875 mg) by mouth 2 times a day for 7 days.  Dispense: 14 tablet; Refill: 0    3. Asymptomatic premature menopause  - XR DEXA bone density; Future    4. High risk medication use  Has taken high dose steroids for prolonged period of time. Recommend dexa scan.   - XR DEXA bone density; Future    F/up 4 months    Mammogram 09/2024.   Pap 9/23/2020-reviewed report including hpv testing-normal. Now has gyn Dr Pugh.   Colonoscopy 9/2021 Tubular adenoma Dr Powell.     Current Outpatient Medications on File Prior to Visit   Medication Sig Dispense Refill    cetirizine (ZyrTEC) 10 mg " tablet TAKE 1 TABLET BY MOUTH ONCE DAILY AS NEEDED FOR ALLERGIES 30 tablet 11    cyanocobalamin (Vitamin B-12) 1,000 mcg tablet Take 1 tablet (1,000 mcg) by mouth once daily.      diphenhydrAMINE (BENADryl) 25 mg capsule Take 1 capsule (25 mg) by mouth if needed for other (migraine) for up to 5 days. 5 capsule 0    escitalopram (Lexapro) 5 mg tablet Take 1 tablet (5 mg) by mouth once daily. 90 tablet 1    fluticasone propion-salmeteroL (Advair Diskus) 500-50 mcg/dose diskus inhaler Inhale 1 puff 2 times a day. Rinse mouth with water after use to reduce aftertaste and incidence of candidiasis. Do not swallow. 60 each 11    ipratropium-albuteroL (Duo-Neb) 0.5-2.5 mg/3 mL nebulizer solution Take 3 mL by nebulization 4 times a day as needed for wheezing. 720 mL 3    linaCLOtide (Linzess) 145 mcg capsule Take 1 capsule (145 mcg) by mouth once daily in the morning. Take before meals. Do not crush or chew. (Patient taking differently: Take 1 capsule (145 mcg) by mouth once daily in the morning. Take before meals. Do not crush or chew.  PT HAS NOT STARTED YET) 30 capsule 1    magnesium oxide 500 mg magnesium tablet Take 1 tablet (500 mg) by mouth once daily.      montelukast (Singulair) 10 mg tablet Take 1 tablet (10 mg) by mouth once daily. 90 tablet 3    ondansetron (Zofran) 8 mg tablet Take 1 tablet (8 mg) by mouth every 8 hours if needed for nausea (headache). 30 tablet 1    pantoprazole (ProtoNix) 40 mg EC tablet Take 1 tablet by mouth twice daily 60 tablet 3    rizatriptan (Maxalt) 10 mg tablet Take 1 tablet (10 mg) by mouth 1 time if needed for migraine. May repeat in 2 hours if unresolved. Do not exceed 30 mg in 24 hours. 30 tablet 3    Spiriva Respimat 2.5 mcg/actuation inhaler Inhale 2 puffs once daily. 1 each 11    topiramate (Topamax) 25 mg tablet 2 po qam and 1 po qhs 270 tablet 1    [DISCONTINUED] cholecalciferol (Vitamin D3) 1,250 mcg (50,000 unit) tablet 1 po a week for 8 weeks. After completing take otc  vitamin D 2000 units a day. (Patient not taking: Reported on 12/26/2024) 8 tablet 0    [DISCONTINUED] metoclopramide (Reglan) 10 mg tablet Take 1 tablet (10 mg) by mouth if needed (nausea) for up to 5 days. (Patient not taking: Reported on 12/26/2024) 5 tablet 0     No current facility-administered medications on file prior to visit.         Claire Duron MD

## 2024-12-26 ENCOUNTER — APPOINTMENT (OUTPATIENT)
Dept: PRIMARY CARE | Facility: CLINIC | Age: 49
End: 2024-12-26
Payer: COMMERCIAL

## 2024-12-26 VITALS
HEART RATE: 93 BPM | BODY MASS INDEX: 39.15 KG/M2 | TEMPERATURE: 97.2 F | SYSTOLIC BLOOD PRESSURE: 122 MMHG | OXYGEN SATURATION: 98 % | WEIGHT: 199.4 LBS | HEIGHT: 60 IN | DIASTOLIC BLOOD PRESSURE: 68 MMHG

## 2024-12-26 DIAGNOSIS — Z79.899 HIGH RISK MEDICATION USE: ICD-10-CM

## 2024-12-26 DIAGNOSIS — R51.9 INTRACTABLE HEADACHE, UNSPECIFIED CHRONICITY PATTERN, UNSPECIFIED HEADACHE TYPE: ICD-10-CM

## 2024-12-26 DIAGNOSIS — E28.319 ASYMPTOMATIC PREMATURE MENOPAUSE: ICD-10-CM

## 2024-12-26 DIAGNOSIS — R05.9 COUGH, UNSPECIFIED TYPE: Primary | ICD-10-CM

## 2024-12-26 PROCEDURE — 1036F TOBACCO NON-USER: CPT | Performed by: INTERNAL MEDICINE

## 2024-12-26 PROCEDURE — 99214 OFFICE O/P EST MOD 30 MIN: CPT | Performed by: INTERNAL MEDICINE

## 2024-12-26 PROCEDURE — 3008F BODY MASS INDEX DOCD: CPT | Performed by: INTERNAL MEDICINE

## 2024-12-26 RX ORDER — AMOXICILLIN 875 MG/1
875 TABLET, FILM COATED ORAL 2 TIMES DAILY
Qty: 14 TABLET | Refills: 0 | Status: SHIPPED | OUTPATIENT
Start: 2024-12-26 | End: 2025-01-02

## 2024-12-26 RX ORDER — TOPIRAMATE 25 MG/1
TABLET ORAL
Qty: 270 TABLET | Refills: 3 | Status: SHIPPED | OUTPATIENT
Start: 2024-12-26

## 2024-12-26 ASSESSMENT — PAIN SCALES - GENERAL: PAINLEVEL_OUTOF10: 0-NO PAIN

## 2024-12-27 ENCOUNTER — HOSPITAL ENCOUNTER (OUTPATIENT)
Dept: RADIOLOGY | Facility: CLINIC | Age: 49
Discharge: HOME | End: 2024-12-27
Payer: COMMERCIAL

## 2024-12-27 ENCOUNTER — APPOINTMENT (OUTPATIENT)
Dept: PRIMARY CARE | Facility: CLINIC | Age: 49
End: 2024-12-27
Payer: COMMERCIAL

## 2024-12-27 DIAGNOSIS — E28.319 ASYMPTOMATIC PREMATURE MENOPAUSE: ICD-10-CM

## 2024-12-27 DIAGNOSIS — Z79.899 HIGH RISK MEDICATION USE: ICD-10-CM

## 2024-12-31 ENCOUNTER — HOSPITAL ENCOUNTER (OUTPATIENT)
Dept: RADIOLOGY | Facility: CLINIC | Age: 49
Discharge: HOME | End: 2024-12-31
Payer: COMMERCIAL

## 2024-12-31 PROCEDURE — 77080 DXA BONE DENSITY AXIAL: CPT

## 2024-12-31 PROCEDURE — 77080 DXA BONE DENSITY AXIAL: CPT | Performed by: RADIOLOGY

## 2025-01-03 ENCOUNTER — TELEPHONE (OUTPATIENT)
Dept: PRIMARY CARE | Facility: CLINIC | Age: 50
End: 2025-01-03
Payer: COMMERCIAL

## 2025-01-03 NOTE — TELEPHONE ENCOUNTER
----- Message from Claire Nicole sent at 12/31/2024 11:13 AM EST -----  Bone density test (test to check bone strength) is normal.

## 2025-01-06 NOTE — PROGRESS NOTES
Division of Minimally Invasive Gynecologic Surgery  [facility]    25 Gynecology Visit    CC:   Chief Complaint   Patient presents with    Follow-up     Bleeding (spotting) stopped middle of October. Had bleeding from July- 2024.  Review of ultrasound from 11/15.  Hx of diverticultis- CT showed that there is a cyst, had repeat CT scan, again showed cyst. She is not having any pain.          Rema Silver is a 49 y.o. who presents for evaluation of AUB     Pt was recently treated for diverticulitis for which she has a CT scan. CT showed a stable 3.6 cm left adnexal mass and she was referred for further discussion.   Pt is perimenopausal . Menarche at 8. Periods were previously regular with light bleeding lasting 3 days but have become irregular over the past 3 years, but has never gone a full year with no periods. Bleeding would happen every 3-4 months.   Had bleeding daily for 3 months that started  and stopped 3 weeks ago. Bleeding was light/spotting but just persistent. Never heavy enough to require a tampon. Blood was dark brown with small clots. Patient has noted recent cold spells, but denies hot flashes, night sweats.     Since her last visit she has had no vaginal bleeding or pain and feels well.       Imagin TVUS-possible adenomyosis  TVUS : The uterus measures 10.4 x 4.2 x 5.4 cm. The endometrium measures 0.7 cm. Left Simple cyst measuring 2.3 x 2.3 x 2.5 cm.   Labs: H/H 13.2/39.3   TSH wnl  : Emb benign    GYN Hx  Gynecologic history:  Contraception/menstrual regulation: none  Desires Childbearing: denies  Last pap: NILM neg HPV   Her last mammogram was BI-RADS 2 in .   Colonoscopy: completed   Denies family history of breast, ovarian, uterine, colon or endometrial cancer.       OBHx: c/s x2  Pertinent PMH: asthma, H/o NSTEMI listed-pt adamantly denies, Diverticulitis, h/o PE following knee surgery  Pertient PSH:    - endometriosis: [facility] surgery x 3  - c/sx2  - umbilical hernia repair with mesh  - breast reduction    PMHx, PSHx, SHx, Allergies, and Medications updated in Epic.  Past Medical History:   Diagnosis Date    Acute non-ST elevation myocardial infarction (NSTEMI) (Multi) 2018    Asthma     Contact with and (suspected) exposure to covid-19 2023    Cough, unspecified 2022    Disease due to severe acute respiratory syndrome coronavirus 2 (SARS-CoV-2) 2024    Edema 2024    Edema of both lower extremities 2024    Essential (primary) hypertension 09/10/2022    Personal history of COVID-19 2022    Personal history of other diseases of the respiratory system     History of acute bronchitis    Plantar fasciitis, left 2018    Sprain of left foot 04/10/2018    Sprain of medial collateral ligament of knee 2020     Past Surgical History:   Procedure Laterality Date    BREAST SURGERY  2014    Breast Surgery Reduction Procedure     SECTION, CLASSIC  2014     Section    ESOPHAGOGASTRODUODENOSCOPY  2014    Diagnostic Esophagogastroduodenoscopy    LAPAROSCOPY DIAGNOSTIC / BIOPSY / ASPIRATION / LYSIS  2014    Exploratory Laparoscopy    OTHER SURGICAL HISTORY  2020    Complete colonoscopy    UMBILICAL HERNIA REPAIR  2014    Umbilical Hernia Repair     Allergies   Allergen Reactions    Opioids - Morphine Analogues Nausea/vomiting       Current Outpatient Medications:     cetirizine (ZyrTEC) 10 mg tablet, TAKE 1 TABLET BY MOUTH ONCE DAILY AS NEEDED FOR ALLERGIES, Disp: 30 tablet, Rfl: 11    cyanocobalamin (Vitamin B-12) 1,000 mcg tablet, Take 1 tablet (1,000 mcg) by mouth once daily., Disp: , Rfl:     diphenhydrAMINE (BENADryl) 25 mg capsule, Take 1 capsule (25 mg) by mouth if needed for other (migraine) for up to 5 days., Disp: 5 capsule, Rfl: 0    doxycycline (Vibra-Tabs) 100 mg tablet, Take 1 tablet (100 mg) by mouth 2 times a day  for 10 days. Take with a full glass of water and do not lie down for at least 30 minutes after., Disp: 20 tablet, Rfl: 0    dupilumab (Dupixent) 300 mg/2 mL pen injector, Inject 2 mL (300 mg) under the skin every 14 (fourteen) days., Disp: 12 mL, Rfl: 3    escitalopram (Lexapro) 5 mg tablet, Take 1 tablet (5 mg) by mouth once daily., Disp: 90 tablet, Rfl: 1    fluticasone (Flonase) 50 mcg/actuation nasal spray, Administer 1 spray into each nostril once daily. Shake gently. Before first use, prime pump. After use, clean tip and replace cap., Disp: , Rfl:     fluticasone propion-salmeteroL (Advair Diskus) 500-50 mcg/dose diskus inhaler, Inhale 1 puff 2 times a day. Rinse mouth with water after use to reduce aftertaste and incidence of candidiasis. Do not swallow., Disp: 60 each, Rfl: 11    ipratropium-albuteroL (Duo-Neb) 0.5-2.5 mg/3 mL nebulizer solution, Take 3 mL by nebulization 4 times a day as needed for wheezing., Disp: 720 mL, Rfl: 3    magnesium oxide 500 mg magnesium tablet, Take 1 tablet (500 mg) by mouth once daily., Disp: , Rfl:     montelukast (Singulair) 10 mg tablet, Take 1 tablet (10 mg) by mouth once daily., Disp: 90 tablet, Rfl: 3    ondansetron (Zofran) 8 mg tablet, Take 1 tablet (8 mg) by mouth every 8 hours if needed for nausea (headache)., Disp: 30 tablet, Rfl: 1    pantoprazole (ProtoNix) 40 mg EC tablet, Take 1 tablet by mouth twice daily, Disp: 60 tablet, Rfl: 3    predniSONE (Deltasone) 10 mg tablet, Take 4 tabs (40mg) daily for 3 days, then 3 tabs (30mg) daily for 3 days,  2 tabs (20mg) daily for 3 days,  1 tab (10 mg) daily for 3 days., Disp: 30 tablet, Rfl: 0    rizatriptan (Maxalt) 10 mg tablet, Take 1 tablet (10 mg) by mouth 1 time if needed for migraine. May repeat in 2 hours if unresolved. Do not exceed 30 mg in 24 hours., Disp: 30 tablet, Rfl: 3    Spiriva Respimat 2.5 mcg/actuation inhaler, Inhale 2 puffs once daily., Disp: 1 each, Rfl: 11    topiramate (Topamax) 25 mg tablet, 2 po  qam and 1 po qhs, Disp: 270 tablet, Rfl: 3    linaCLOtide (Linzess) 145 mcg capsule, Take 1 capsule (145 mcg) by mouth once daily in the morning. Take before meals. Do not crush or chew. (Patient not taking: Reported on 2025), Disp: 30 capsule, Rfl: 1  Social History     Socioeconomic History    Marital status:      Spouse name: Not on file    Number of children: Not on file    Years of education: Not on file    Highest education level: Not on file   Occupational History    Not on file   Tobacco Use    Smoking status: Never    Smokeless tobacco: Never   Vaping Use    Vaping status: Never Used   Substance and Sexual Activity    Alcohol use: Yes     Comment: very rarely    Drug use: Never    Sexual activity: Not on file   Other Topics Concern    Not on file   Social History Narrative    Not on file     Social Drivers of Health     Financial Resource Strain: Low Risk  (2024)    Overall Financial Resource Strain (CARDIA)     Difficulty of Paying Living Expenses: Not hard at all   Food Insecurity: Not on file   Transportation Needs: No Transportation Needs (2024)    PRAPARE - Transportation     Lack of Transportation (Medical): No     Lack of Transportation (Non-Medical): No   Physical Activity: Not on file   Stress: Not on file   Social Connections: Not on file   Intimate Partner Violence: Not on file   Housing Stability: Low Risk  (2024)    Housing Stability Vital Sign     Unable to Pay for Housing in the Last Year: No     Number of Times Moved in the Last Year: 1     Homeless in the Last Year: No     Family History   Problem Relation Name Age of Onset    HOLLY disease Maternal Grandfather      Newman's esophagus Maternal Grandfather      Asthma Other Aunt      OB History          2    Para   2    Term   2            AB        Living             SAB        IAB        Ectopic        Multiple        Live Births                        ROS: reviewed and negative    PE:/79   Wt  88.5 kg (195 lb)   LMP  (LMP Unknown)   BMI 38.08 kg/m²   Gen: NAD  Psych: AAOx3  Skin: no lesions  Pulm: nonlabored breathing, normal respiratory effort  Cards: normal peripheral perfusion  Lymph: no LAD in axilla or groin  GI: soft, non-tender, non-distended, no rebound/guarding, no masses  : deferred      A/P: Rema Silver is a 49 y.o. with anovulatory bleeding likely related to perimenopause and an incidental ovarian cyst    Anovulatory bleeding  -TVUS reviewed. No structural causes  - given bleeding pattern of skipping cycles with light bleeding this is likely related to perimenopause. She has had no bleeding for the past 3 months  - discussed that the only way to definitively evaluate the endometrium would be to perform and EMB. Patient has previously ahd one in 2020 which was benign and was exteremly painful for her. Discussed the risk of missing a hyperplasia or malignancy. Pt verbalized understanding and declines.   - she will return with any new or heavy bleeding and is willing to undergo EMB at that time.    Ovarian cyst  Discussed with patient the natural course of functional ovarian cysts. Reviewed that there is often a normal physiologic development of cysts in young, premenopausal women. The majority of these cysts will resolve over time and do not require surgical intervention unless there are concerning features, persistent pain or concern for rupture/torsion. Small cysts 3-5 cm, typically do not require follow up, while cysts>5 cm are managed with surveillance, and surgical intervention based on size alone is not required until the cyst is >10 cm.  -TVUS with simple cyst decreasing in size. No follow up needed unless symptomatic give small size    Bita Pugh MD  Division of Minimally Invasive Gynecologic Surgery  [facility]

## 2025-01-08 ENCOUNTER — APPOINTMENT (OUTPATIENT)
Dept: PULMONOLOGY | Facility: CLINIC | Age: 50
End: 2025-01-08
Payer: COMMERCIAL

## 2025-01-08 ENCOUNTER — OFFICE VISIT (OUTPATIENT)
Dept: OBSTETRICS AND GYNECOLOGY | Facility: CLINIC | Age: 50
End: 2025-01-08
Payer: COMMERCIAL

## 2025-01-08 VITALS — BODY MASS INDEX: 38.08 KG/M2 | SYSTOLIC BLOOD PRESSURE: 118 MMHG | WEIGHT: 195 LBS | DIASTOLIC BLOOD PRESSURE: 79 MMHG

## 2025-01-08 VITALS
HEART RATE: 81 BPM | DIASTOLIC BLOOD PRESSURE: 91 MMHG | SYSTOLIC BLOOD PRESSURE: 115 MMHG | WEIGHT: 194 LBS | TEMPERATURE: 97.5 F | HEIGHT: 60 IN | OXYGEN SATURATION: 97 % | RESPIRATION RATE: 16 BRPM | BODY MASS INDEX: 38.09 KG/M2

## 2025-01-08 DIAGNOSIS — J45.50 SEVERE PERSISTENT ASTHMA WITHOUT COMPLICATION (MULTI): ICD-10-CM

## 2025-01-08 DIAGNOSIS — N83.209 CYST OF OVARY, UNSPECIFIED LATERALITY: ICD-10-CM

## 2025-01-08 DIAGNOSIS — N93.9 ABNORMAL UTERINE BLEEDING UNRELATED TO MENSTRUAL CYCLE: Primary | ICD-10-CM

## 2025-01-08 DIAGNOSIS — J01.90 ACUTE SINUSITIS, RECURRENCE NOT SPECIFIED, UNSPECIFIED LOCATION: Primary | ICD-10-CM

## 2025-01-08 PROCEDURE — 1036F TOBACCO NON-USER: CPT | Performed by: STUDENT IN AN ORGANIZED HEALTH CARE EDUCATION/TRAINING PROGRAM

## 2025-01-08 PROCEDURE — 3008F BODY MASS INDEX DOCD: CPT | Performed by: INTERNAL MEDICINE

## 2025-01-08 PROCEDURE — 99214 OFFICE O/P EST MOD 30 MIN: CPT | Performed by: STUDENT IN AN ORGANIZED HEALTH CARE EDUCATION/TRAINING PROGRAM

## 2025-01-08 PROCEDURE — 99214 OFFICE O/P EST MOD 30 MIN: CPT | Performed by: INTERNAL MEDICINE

## 2025-01-08 RX ORDER — FLUTICASONE PROPIONATE 50 MCG
1 SPRAY, SUSPENSION (ML) NASAL DAILY
COMMUNITY

## 2025-01-08 RX ORDER — PREDNISONE 10 MG/1
TABLET ORAL
Qty: 30 TABLET | Refills: 0 | Status: SHIPPED | OUTPATIENT
Start: 2025-01-08 | End: 2025-02-07

## 2025-01-08 RX ORDER — DOXYCYCLINE HYCLATE 100 MG
100 TABLET ORAL 2 TIMES DAILY
Qty: 20 TABLET | Refills: 0 | Status: SHIPPED | OUTPATIENT
Start: 2025-01-08 | End: 2025-01-18

## 2025-01-14 ENCOUNTER — ANESTHESIA EVENT (OUTPATIENT)
Dept: GASTROENTEROLOGY | Facility: HOSPITAL | Age: 50
End: 2025-01-14
Payer: COMMERCIAL

## 2025-01-14 ENCOUNTER — HOSPITAL ENCOUNTER (OUTPATIENT)
Dept: GASTROENTEROLOGY | Facility: HOSPITAL | Age: 50
Discharge: HOME | End: 2025-01-14
Payer: COMMERCIAL

## 2025-01-14 ENCOUNTER — ANESTHESIA (OUTPATIENT)
Dept: GASTROENTEROLOGY | Facility: HOSPITAL | Age: 50
End: 2025-01-14
Payer: COMMERCIAL

## 2025-01-14 VITALS
WEIGHT: 195 LBS | SYSTOLIC BLOOD PRESSURE: 125 MMHG | HEIGHT: 60 IN | TEMPERATURE: 96.8 F | HEART RATE: 81 BPM | OXYGEN SATURATION: 95 % | BODY MASS INDEX: 38.28 KG/M2 | DIASTOLIC BLOOD PRESSURE: 65 MMHG | RESPIRATION RATE: 16 BRPM

## 2025-01-14 DIAGNOSIS — K21.9 GASTROESOPHAGEAL REFLUX DISEASE, UNSPECIFIED WHETHER ESOPHAGITIS PRESENT: Primary | ICD-10-CM

## 2025-01-14 LAB — HCG UR QL IA.RAPID: NEGATIVE

## 2025-01-14 PROCEDURE — 7100000010 HC PHASE TWO TIME - EACH INCREMENTAL 1 MINUTE

## 2025-01-14 PROCEDURE — 43239 EGD BIOPSY SINGLE/MULTIPLE: CPT | Performed by: INTERNAL MEDICINE

## 2025-01-14 PROCEDURE — 81025 URINE PREGNANCY TEST: CPT | Performed by: INTERNAL MEDICINE

## 2025-01-14 PROCEDURE — 3700000001 HC GENERAL ANESTHESIA TIME - INITIAL BASE CHARGE

## 2025-01-14 PROCEDURE — A43239 PR EDG TRANSORAL BIOPSY SINGLE/MULTIPLE: Performed by: ANESTHESIOLOGY

## 2025-01-14 PROCEDURE — 3700000002 HC GENERAL ANESTHESIA TIME - EACH INCREMENTAL 1 MINUTE

## 2025-01-14 PROCEDURE — 7100000009 HC PHASE TWO TIME - INITIAL BASE CHARGE

## 2025-01-14 PROCEDURE — 2500000004 HC RX 250 GENERAL PHARMACY W/ HCPCS (ALT 636 FOR OP/ED): Performed by: ANESTHESIOLOGIST ASSISTANT

## 2025-01-14 PROCEDURE — A43239 PR EDG TRANSORAL BIOPSY SINGLE/MULTIPLE: Performed by: ANESTHESIOLOGIST ASSISTANT

## 2025-01-14 PROCEDURE — 2500000005 HC RX 250 GENERAL PHARMACY W/O HCPCS: Performed by: ANESTHESIOLOGIST ASSISTANT

## 2025-01-14 RX ORDER — PROPOFOL 10 MG/ML
INJECTION, EMULSION INTRAVENOUS AS NEEDED
Status: DISCONTINUED | OUTPATIENT
Start: 2025-01-14 | End: 2025-01-14

## 2025-01-14 RX ORDER — TOPICAL ANESTHETIC 200 MG/ML
SPRAY DENTAL; PERIODONTAL AS NEEDED
Status: DISCONTINUED | OUTPATIENT
Start: 2025-01-14 | End: 2025-01-14

## 2025-01-14 RX ORDER — LIDOCAINE HYDROCHLORIDE 20 MG/ML
INJECTION, SOLUTION EPIDURAL; INFILTRATION; INTRACAUDAL; PERINEURAL AS NEEDED
Status: DISCONTINUED | OUTPATIENT
Start: 2025-01-14 | End: 2025-01-14

## 2025-01-14 RX ADMIN — PROPOFOL 20 MG: 10 INJECTION, EMULSION INTRAVENOUS at 09:51

## 2025-01-14 RX ADMIN — TOPICAL ANESTHETIC 1 SPRAY: 200 SPRAY DENTAL; PERIODONTAL at 09:46

## 2025-01-14 RX ADMIN — PROPOFOL 80 MG: 10 INJECTION, EMULSION INTRAVENOUS at 09:49

## 2025-01-14 RX ADMIN — PROPOFOL 200 MCG/KG/MIN: 10 INJECTION, EMULSION INTRAVENOUS at 09:50

## 2025-01-14 RX ADMIN — LIDOCAINE HYDROCHLORIDE 80 MG: 20 INJECTION, SOLUTION EPIDURAL; INFILTRATION; INTRACAUDAL; PERINEURAL at 09:46

## 2025-01-14 SDOH — HEALTH STABILITY: MENTAL HEALTH: CURRENT SMOKER: 0

## 2025-01-14 ASSESSMENT — PAIN - FUNCTIONAL ASSESSMENT
PAIN_FUNCTIONAL_ASSESSMENT: 0-10
PAIN_FUNCTIONAL_ASSESSMENT: 0-10

## 2025-01-14 ASSESSMENT — COLUMBIA-SUICIDE SEVERITY RATING SCALE - C-SSRS
1. IN THE PAST MONTH, HAVE YOU WISHED YOU WERE DEAD OR WISHED YOU COULD GO TO SLEEP AND NOT WAKE UP?: NO
6. HAVE YOU EVER DONE ANYTHING, STARTED TO DO ANYTHING, OR PREPARED TO DO ANYTHING TO END YOUR LIFE?: NO
2. HAVE YOU ACTUALLY HAD ANY THOUGHTS OF KILLING YOURSELF?: NO

## 2025-01-14 ASSESSMENT — PAIN SCALES - GENERAL
PAIN_LEVEL: 0
PAINLEVEL_OUTOF10: 0 - NO PAIN
PAINLEVEL_OUTOF10: 3

## 2025-01-14 NOTE — ANESTHESIA POSTPROCEDURE EVALUATION
Patient: Rema SHAFER Spinoso    Procedure Summary       Date: 01/14/25 Room / Location: [facility] [place]    Anesthesia Start: 0943 Anesthesia Stop: 1008    Procedures:       EGD      BRAVO Diagnosis: Gastroesophageal reflux disease, unspecified whether esophagitis present    Scheduled Providers: Solomon Strong MD Responsible Provider: Lee Arias MD    Anesthesia Type: MAC ASA Status: 3            Anesthesia Type: MAC    Vitals Value Taken Time   /60 01/14/25 1008   Temp 36 °C (96.8 °F) 01/14/25 1008   Pulse 90 01/14/25 1008   Resp 16 01/14/25 1008   SpO2 95 % 01/14/25 1008       Anesthesia Post Evaluation    Patient location during evaluation: PACU  Patient participation: complete - patient participated  Level of consciousness: sleepy but conscious  Pain score: 0  Pain management: adequate  Airway patency: patent  Cardiovascular status: acceptable  Respiratory status: acceptable  Hydration status: acceptable  Postoperative Nausea and Vomiting: none    No notable events documented.

## 2025-01-14 NOTE — DISCHARGE INSTRUCTIONS

## 2025-01-14 NOTE — ANESTHESIA PREPROCEDURE EVALUATION
Patient: Rema SHAFER Spinoso    Procedure Information       Date/Time: 01/14/25 0930    Scheduled providers: Solomon Strong MD    Procedures:       EGD      BRAVO    [place] [facility] [place]            Relevant Problems   Cardiac   (+) Atypical chest pain   (+) Hyperlipidemia      Pulmonary   (+) Asthma   (+) Severe persistent asthma without complication (Multi)      GI   (+) GERD (gastroesophageal reflux disease)   (+) Hiatal hernia      Musculoskeletal   (+) Fibromyalgia   (+) Localized, primary osteoarthritis      GYN   (+) Abnormal uterine bleeding unrelated to menstrual cycle       Clinical information reviewed:   Tobacco  Allergies  Meds   Med Hx  Surg Hx  OB Status  Fam Hx  Soc   Hx        NPO Detail:  NPO/Void Status  Carbohydrate Drink Given Prior to Surgery? : N  Date of Last Liquid: 01/14/25  Time of Last Liquid: 0000  Date of Last Solid: 01/13/25  Time of Last Solid: 2000  Last Intake Type: Clear fluids  Time of Last Void: 0820         Physical Exam    Airway  Mallampati: II  TM distance: >3 FB  Neck ROM: full     Cardiovascular   Rhythm: regular  Rate: normal     Dental - normal exam     Pulmonary   Breath sounds clear to auscultation     Abdominal - normal exam             Anesthesia Plan    History of general anesthesia?: yes  History of complications of general anesthesia?: no    ASA 3     MAC     The patient is not a current smoker.    intravenous induction   Anesthetic plan and risks discussed with patient.    Plan discussed with CAA.

## 2025-01-17 LAB
LABORATORY COMMENT REPORT: NORMAL
PATH REPORT.FINAL DX SPEC: NORMAL
PATH REPORT.GROSS SPEC: NORMAL
PATH REPORT.RELEVANT HX SPEC: NORMAL
PATH REPORT.TOTAL CANCER: NORMAL

## 2025-01-21 DIAGNOSIS — J45.50 SEVERE PERSISTENT ASTHMA WITHOUT COMPLICATION (MULTI): ICD-10-CM

## 2025-01-21 RX ORDER — TIOTROPIUM BROMIDE INHALATION SPRAY 3.12 UG/1
SPRAY, METERED RESPIRATORY (INHALATION)
Qty: 4 G | Refills: 6 | Status: SHIPPED | OUTPATIENT
Start: 2025-01-21

## 2025-01-22 NOTE — RESULT ENCOUNTER NOTE
During recent upper endoscopy biopsies were taken from the stomach and the food pipe.  Pathology results are normal which means no evidence of infection or inflammation was seen.  I will reach out to you once Bravo pH study test results are available.  Sincerely,

## 2025-01-23 DIAGNOSIS — J30.9 ALLERGIC RHINITIS, UNSPECIFIED SEASONALITY, UNSPECIFIED TRIGGER: Primary | ICD-10-CM

## 2025-01-27 RX ORDER — FLUTICASONE PROPIONATE 50 MCG
1 SPRAY, SUSPENSION (ML) NASAL DAILY
Qty: 16 G | Refills: 11 | Status: SHIPPED | OUTPATIENT
Start: 2025-01-27

## 2025-02-11 NOTE — PROGRESS NOTES
Department of Medicine  Division of Pulmonary, Critical Care, and Sleep Medicine  Follow-Up Visit  [facility] - [address], [address]    Physician HPI:  Mrs Silver is a 48-year-old female with past medical history significant for severe persistent asthma who presented to the office today for follow up.  Rema reports severe persistent symptoms over the past few weeks despite recent course of prolonged corticosteroid taper.  She was tested positive for COVID about 3 weeks ago.  Denies high-grade fevers or hypoxemia.  She has frequent cough and severe chest congestion with audible wheezing at times.  Symptoms are not quite improving with nebulized albuterol treatment.  She has been using albuterol more than 4-5 times per day.  She is currently on prednisone 10 mg daily.  She continues to use ICS/LABA/LAMA inhaler and montelukast.    Rema has history of severe persistent asthma for which she was started on Tezspire injections about 1 year ago.  This is her first severe asthma exacerbation since she was started on Tezspire.  She was noted to have mild vocal cord dysfunction on ENT exam several years ago.  Has not seen ENT in the recent past.    ROS: No fevers or chills.  No acute chest pain.  Has nasal and sinus congestion.  Cough with inhalation.  No acute GI symptoms.  No acute skin rash or inflammatory arthritis now.  No orthopnea or lower extremity edema.    Follow up 1/23/2024:  No significant change in respiratory status since last visit last week. Feels tired and fatigued from persistent asthma symptoms and being on high dose of corticosteroids for several weeks now. No recent hx of high grade fevers. Oxygenation is stable on RA.    Follow up 03/14/2024:  Rema presented today for follow-up after recent hospital admission for acute asthma exacerbation.  She is feeling better though she is not back to her baseline yet.  She is on a taper course of prednisone.  She was seen by ENT recently and exam did not  reveal significant VCD.    Follow up 05/21/2024:  Rema reports to feel slightly better after a prolonged persistent asthma that required frequent courses of systemic corticosteroids. ++ fatigue. Cough is intermittent but could still occur in spells at times. On ICS/LABA/LAMA.     Follow up 01/08/2025:  Respiratory status is stable now. + fatigue. No persistent cough or wheezing. No nocturnal symptoms.   We discussed recent hx of severe asthma exacerbation despite anti TSLP biologics.       Immunization History   Administered Date(s) Administered    Flu vaccine (IIV4), preservative free *Check age/dose* 09/21/2018, 12/05/2022    Flu vaccine, quadrivalent, recombinant, preservative free, adult (FLUBLOK) 11/01/2024    Influenza, Unspecified 12/04/2009    Influenza, injectable, quadrivalent 10/14/2019, 11/06/2020, 10/25/2021    Influenza, seasonal, injectable 12/04/2009, 10/21/2020, 11/03/2021, 12/05/2022    Moderna SARS-CoV-2 Booster 11/01/2024    Moderna SARS-CoV-2 Vaccination 04/27/2021, 05/27/2021, 12/15/2021    Pneumococcal polysaccharide vaccine, 23-valent, age 2 years and older (PNEUMOVAX 23) 10/14/2019    Tdap vaccine, age 7 year and older (BOOSTRIX, ADACEL) 09/21/2018, 09/26/2019       Current Outpatient Medications   Medication Instructions    cetirizine (ZyrTEC) 10 mg tablet TAKE 1 TABLET BY MOUTH ONCE DAILY AS NEEDED FOR ALLERGIES    cyanocobalamin (Vitamin B-12) 1,000 mcg tablet 1 tablet, Daily    diphenhydrAMINE (BENADRYL) 25 mg, oral, As needed    dupilumab (DUPIXENT) 300 mg, subcutaneous, Every 14 days    escitalopram (LEXAPRO) 5 mg, oral, Daily    fluticasone (Flonase) 50 mcg/actuation nasal spray 1 spray, Each Nostril, Daily, Shake gently. Before first use, prime pump. After use, clean tip and replace cap.    fluticasone propion-salmeteroL (Advair Diskus) 500-50 mcg/dose diskus inhaler 1 puff, inhalation, 2 times daily RT, Rinse mouth with water after use to reduce aftertaste and incidence of  candidiasis. Do not swallow.    ipratropium-albuteroL (Duo-Neb) 0.5-2.5 mg/3 mL nebulizer solution 3 mL, nebulization, 4 times daily PRN    linaCLOtide (LINZESS) 145 mcg, oral, Daily before breakfast, Do not crush or chew.    magnesium oxide 500 mg magnesium tablet 1 tablet, Daily    montelukast (SINGULAIR) 10 mg, oral, Daily    ondansetron (ZOFRAN) 8 mg, oral, Every 8 hours PRN    pantoprazole (PROTONIX) 40 mg, oral, 2 times daily    rizatriptan (MAXALT) 10 mg, oral, Once as needed, May repeat in 2 hours if unresolved. Do not exceed 30 mg in 24 hours.    Spiriva Respimat 2.5 mcg/actuation inhaler INHALE 2 SPRAY(S) BY MOUTH ONCE DAILY    topiramate (Topamax) 25 mg tablet 2 po qam and 1 po qhs        Allergies:  Allergies   Allergen Reactions    Fentanyl Respiratory depression     Happened alone, unsure if this was the reaction    Opioids - Morphine Analogues Nausea/vomiting          Visit Vitals  BP (!) 115/91   Pulse 81   Temp 36.4 °C (97.5 °F) (Tympanic)   Resp 16   Ht 1.524 m (5')   Wt 88 kg (194 lb)   SpO2 97%   BMI 37.89 kg/m²   OB Status Having periods   Smoking Status Never   BSA 1.93 m²        Physical Exam  Vitals and nursing note reviewed.   Constitutional:       General: She is not in acute distress.     Appearance: Normal appearance.   HENT:      Head: Normocephalic and atraumatic.   Eyes:      Conjunctiva/sclera: Conjunctivae normal.   Cardiovascular:      Rate and Rhythm: Normal rate and regular rhythm.   Pulmonary:      Effort: Pulmonary effort is normal. No respiratory distress.      Breath sounds: No stridor. No wheezing or rhonchi.   Musculoskeletal:         General: Normal range of motion.      Cervical back: Normal range of motion and neck supple.   Skin:     General: Skin is warm and dry.      Coloration: Skin is not jaundiced.   Neurological:      General: No focal deficit present.      Mental Status: She is alert and oriented to person, place, and time. Mental status is at baseline.    Psychiatric:         Mood and Affect: Mood normal.         Behavior: Behavior normal.         Judgment: Judgment normal.            Chest Radiograph     XR chest 1 view 08/13/2023    Narrative  Interpreted By:  KIRSTIN BIRMINGHAM MD  MRN: [id number]  Patient Name: SUJIT PEREA    STUDY:  CHEST 1 VIEW;  8/13/2023 3:58 pm    INDICATION:  Chest Pain .    COMPARISON:  March 25, 2023 chest CT and chest radiograph    ACCESSION NUMBER(S):  [id number]    ORDERING CLINICIAN:  HUGH FRANCISCO    FINDINGS:  AP radiograph of the chest was provided.        CARDIOMEDIASTINAL SILHOUETTE:  Cardiomediastinal silhouette is normal in size and configuration.    LUNGS:  Lungs are clear.    ABDOMEN:  No remarkable upper abdominal findings.    BONES:  No acute osseous changes.    Impression  1.  No evidence of acute cardiopulmonary process.        MACRO:  None      XR chest 1 view     Narrative  Interpreted By:  NO MARTINEZ MD  MRN: [id number]  Patient Name: SUJIT PEREA    STUDY:  CHEST 1 VIEW;  3/25/2023 11:08 am    INDICATION:  Chest Pain .    COMPARISON:  12/18/2022    ACCESSION NUMBER(S):  [id number]    ORDERING CLINICIAN:  MARY GROSSMAN    FINDINGS:  The heart is not enlarged. No infiltrate, pleural effusion or  pneumothorax is seen.    Impression  No active cardiopulmonary disease.      Echocardiogram     No results found for this or any previous visit from the past 365 days.       Chest CT Scan     CT angio chest for pulmonary embolism 08/13/2023    Narrative  Interpreted By:  EDEN CATALAN MD  MRN: [id number]  Patient Name: SUJIT PEREA    STUDY:  CT ANGIO CHEST FOR PE;  8/13/2023 5:36 pm    INDICATION:  chest pain, SOB, lower extremity swelling .    COMPARISON:  None.    ACCESSION NUMBER(S):  [id number]    ORDERING CLINICIAN:  JAMEY PIERCE    TECHNIQUE:  Contiguous axial images of the chest were obtained after the  intravenous administration of  60 mL Omnipaque 350. Coronal and  sagittal reformatted images were obtained  from the axial images. MIPS  of the chest were also performed and reviewed and from the findings  of the examination.    FINDINGS:  No axillary, mediastinal, or hilar lymphadenopathy.    The heart is normal in size. No significant pericardial effusion. No  evidence of acute central, main, lobar or proximal segmental  pulmonary embolism.    Mild bilateral subsegmental atelectasis. No significant pleural  effusion. No pneumothorax.    Limited evaluation of the upper abdomen.  Hepatic steatosis.    Multilevel degenerative change of the thoracic spine.    Impression  No evidence of acute pulmonary embolism.    No evidence of pneumonia.      CT angio chest for pulmonary embolism     Narrative  Interpreted By:  KIRSTIN BIRMINGHAM MD  MRN: [id number]  Patient Name: SUJIT PEREA    STUDY:  CT ANGIO CHEST FOR PE;  3/25/2023 1:29 pm    INDICATION:  chest pain .    COMPARISON:    Nancy 3, 2018 chest CT, January 10, 2021, March 15, 2021 and August 24, 2022 CT pulmonary angiograms.    ACCESSION NUMBER(S):  [id number]    ORDERING CLINICIAN:  MARY GROSSMAN    TECHNIQUE:  Helical data acquisition of the chest was obtained after intravenous  administration of 60 milliliterOMNIPAQUE 350, as per PE protocol.  Images were reformatted in coronal and sagittal planes. Axial and  coronal maximum intensity projection (MIP) images were created and  reviewed.    FINDINGS:  POTENTIAL LIMITATIONS OF THE STUDY: Likely technically adequate  although image degradation related to motion, and streak artifact  contributing to image heterogeneity    HEART AND VESSELS:  Pulmonary arterial heterogeneity including bandlike hypoenhancement  left lower lobe branches centrally series 402, image 109 most  compatible with. No evident definite pulmonary embolism identified.  Main pulmonary artery and its branches are normal in caliber.    The thoracic aorta normal in course and caliber.Although, the study  is not tailored for evaluation of aorta, there is no  definite  evidence of acute aortic pathology.  No coronary artery calcifications are seen. Please note, the study is  not optimized for evaluation of coronary arteries.    The cardiac chambers are not enlarged.There are no findings to  suggest right heart strain.    There is no pericardial effusion seen.    MEDIASTINUM AND JOAN, LOWER NECK AND AXILLA:  The visualized thyroid gland is within normal limits.  No evidence of thoracic lymphadenopathy by CT criteria.  Esophagus appears within normal limits as seen.    LUNGS AND AIRWAYS:  The trachea and central airways are patent. No endobronchial lesion  is seen.    Minor increased lung heterogeneity with curvilinear ground-glass  changes at the extreme bases compatible with atelectasis. No evident  consolidative pneumonia, edema, or effusion.      UPPER ABDOMEN:  The visualized subdiaphragmatic structures demonstrate no remarkable  findings.        CHEST WALL AND OSSEOUS STRUCTURES:  Chest wall is within normal limits.  No acute osseous pathology.There are no suspicious osseous  lesions.Scattered degenerative changes involving the spine appears  similar.    Impression  1. No evidence of acute pulmonary embolism.  2. Minor increased subsegmental atelectasis.       Laboratory Studies     Lab Results   Component Value Date    WBC 9.0 09/23/2024    HGB 13.2 09/23/2024    HCT 39.3 09/23/2024    MCV 92 09/23/2024     09/23/2024      Lab Results   Component Value Date    GLUCOSE 114 (H) 09/23/2024    CALCIUM 9.4 09/23/2024     09/23/2024    K 4.4 09/23/2024    CO2 25 09/23/2024     (H) 09/23/2024    BUN 12 09/23/2024    CREATININE 0.81 09/23/2024      Lab Results   Component Value Date    ALT 24 09/23/2024    AST 18 09/23/2024    ALKPHOS 60 09/23/2024    BILITOT 0.3 09/23/2024        Protime   Date/Time Value Ref Range Status   07/23/2024 09:57 AM 11.0 9.8 - 12.8 seconds Final     INR   Date/Time Value Ref Range Status   07/23/2024 09:57 AM 1.0 0.9 - 1.1  Final       Immunocap IgE   Date/Time Value Ref Range Status   03/19/2022 12:51 PM 18.3 0.0 - 214.0 KU/L Final     Comment:      Note:  Omalizumab (Xolair, [employer]; humanized    IgG1 antihuman IgE Fc) treatment does not    significantly interfere with the accuracy of    total IgE on the ImmunoCAP (ipadio) platform.    J Allergy Clin Immunol 2006;117:759-66).   Allergens, parasitic diseases, smoking, and   alcohol consumption have been reported to   increase levels of total IgE in serum.       Aspergillus Fumigatus IgE   Date/Time Value Ref Range Status   03/19/2022 12:51 PM <0.10 <0.35 KU/L Final     Comment:       SEE IMMUNOCAP INTERP.IGE      Total IgG   Date/Time Value Ref Range Status   01/03/2023 10:32  700 - 1,600 mg/dL Final     Comment:     MONOCLONAL PROTEINS MAY CAUSE FALSELY LOW  RESULTS IN THIS ASSAY. SERUM PROTEIN  ELECTROPHORESIS SHOULD BE DONE AS THE  FIRST TEST TO EVALUATE MONOCLONAL GAMMOPATHY.       IgA   Date/Time Value Ref Range Status   01/03/2023 10:32  70 - 400 mg/dL Final     Comment:     MONOCLONAL PROTEINS MAY CAUSE FALSELY LOW  RESULTS IN THIS ASSAY. SERUM PROTEIN  ELECTROPHORESIS SHOULD BE DONE AS THE  FIRST TEST TO EVALUATE MONOCLONAL GAMMOPATHY.       IgM   Date/Time Value Ref Range Status   01/03/2023 10:32 AM 67 40 - 230 mg/dL Final     Comment:     MONOCLONAL PROTEINS MAY CAUSE FALSELY LOW  RESULTS IN THIS ASSAY. SERUM PROTEIN  ELECTROPHORESIS SHOULD BE DONE AS THE  FIRST TEST TO EVALUATE MONOCLONAL GAMMOPATHY.             Assessment and Plan / Recommendations     Severe persistent asthma  GERD  History of vocal cord dysfunction    CT chest 1/27/2024: no evidence of parenchymal disease or bronchiectasis.  ENT exam 2/27 is reviewed today.     Plan:  Continue on ICS/LABA/LAMA + Montelukast.  Continue on PPI for GERD  Can use albuterol nebs every 4 hours as needed  Start Dupixent every 2 weeks.   Follow up in 3 months.     Mary Hanna MD

## 2025-02-19 ENCOUNTER — APPOINTMENT (OUTPATIENT)
Dept: NEUROLOGY | Facility: CLINIC | Age: 50
End: 2025-02-19
Payer: COMMERCIAL

## 2025-02-19 VITALS
HEART RATE: 105 BPM | SYSTOLIC BLOOD PRESSURE: 126 MMHG | BODY MASS INDEX: 38.54 KG/M2 | DIASTOLIC BLOOD PRESSURE: 80 MMHG | WEIGHT: 196.3 LBS | HEIGHT: 60 IN | TEMPERATURE: 97.1 F

## 2025-02-19 DIAGNOSIS — R41.89 COGNITIVE CHANGES: ICD-10-CM

## 2025-02-19 DIAGNOSIS — G43.809 OTHER MIGRAINE WITHOUT STATUS MIGRAINOSUS, NOT INTRACTABLE: ICD-10-CM

## 2025-02-19 DIAGNOSIS — R55 SYNCOPE, UNSPECIFIED SYNCOPE TYPE: Primary | ICD-10-CM

## 2025-02-19 PROCEDURE — 1036F TOBACCO NON-USER: CPT | Performed by: PSYCHIATRY & NEUROLOGY

## 2025-02-19 PROCEDURE — 3008F BODY MASS INDEX DOCD: CPT | Performed by: PSYCHIATRY & NEUROLOGY

## 2025-02-19 PROCEDURE — 99214 OFFICE O/P EST MOD 30 MIN: CPT | Performed by: PSYCHIATRY & NEUROLOGY

## 2025-02-19 ASSESSMENT — LIFESTYLE VARIABLES
AUDIT-C TOTAL SCORE: 1
SKIP TO QUESTIONS 9-10: 1
HOW OFTEN DO YOU HAVE A DRINK CONTAINING ALCOHOL: MONTHLY OR LESS
HOW OFTEN DO YOU HAVE SIX OR MORE DRINKS ON ONE OCCASION: NEVER
HOW MANY STANDARD DRINKS CONTAINING ALCOHOL DO YOU HAVE ON A TYPICAL DAY: 1 OR 2

## 2025-02-19 ASSESSMENT — PATIENT HEALTH QUESTIONNAIRE - PHQ9
SUM OF ALL RESPONSES TO PHQ9 QUESTIONS 1 & 2: 0
2. FEELING DOWN, DEPRESSED OR HOPELESS: NOT AT ALL
1. LITTLE INTEREST OR PLEASURE IN DOING THINGS: NOT AT ALL

## 2025-02-19 NOTE — PROGRESS NOTES
"Consulting Physician: None    Chief Complaint: Hospitalization Follow Up    History Of Present Illness  Rema Silver is a 49 y.o. female presenting with above.    On July, the patient was in the passenger seat getting back from a trip.  She states that she was 'slap happy.'  She was laughing so hard that she lost consciousness.  She was out for about 10 seconds.  There was no convulsions, tongue biting, urinary incontinence.  When she woke up, she was disoriented.  She had some tunneled vision.  She was much more withdrawn.  Her boyfriend drove for 3 hours following this event.  For about 2.5 hours, she was 'out of it.' By the time she got home, she had felt okay.  The following day, she woke up and had difficulty talking.  She had difficulty processing - she was constantly asking herself, \"what am I supposed to do today?\"  She also had a bad headache.  She describes a bfirontal/bitemporal stabbing pain with associated photophobia, phonophobia, and nausea.  Her daughter noticed the confusion and slower speech so the patient was taken to the ED.  She was admitted to [facility].  MRI Brain was unremarkable.  She was told that she likely had migraines.  She does have a history of infrequent headaches (mainly when the weather changes).  She does have a strong family history of migraines (sister, mother).  Following the hospitalization, she was put on Topamax for migraine prevention.  She does note word finding difficulties.  She notes that she is often having to write things down.  She actually stopped Topamax.  She has noted an improvement in her cognition.  She does not feel like she is quite back to her normal self.  She continues to note some slowness in thought and occasional word finding difficulty.  She still finds it difficult to multi task.  She notes difficulty waking up in the morning.  She denies any difficulty with sleeping.        Past Medical History  Past Medical History:   Diagnosis Date    Acute non-ST " elevation myocardial infarction (NSTEMI) (Multi) 2018    Asthma     Contact with and (suspected) exposure to covid-19 2023    Cough, unspecified 2022    Disease due to severe acute respiratory syndrome coronavirus 2 (SARS-CoV-2) 2024    Diverticulitis     Edema 2024    Edema of both lower extremities 2024    Essential (primary) hypertension 09/10/2022    Migraines     Personal history of COVID-19 2022    Personal history of other diseases of the respiratory system     History of acute bronchitis    Plantar fasciitis, left 2018    PONV (postoperative nausea and vomiting)     Sprain of left foot 04/10/2018    Sprain of medial collateral ligament of knee 2020       Surgical History  Past Surgical History:   Procedure Laterality Date    BREAST SURGERY  2014    Breast Surgery Reduction Procedure     SECTION, CLASSIC  2014     Section    ESOPHAGOGASTRODUODENOSCOPY  2014    Diagnostic Esophagogastroduodenoscopy    LAPAROSCOPY DIAGNOSTIC / BIOPSY / ASPIRATION / LYSIS  2014    Exploratory Laparoscopy    OTHER SURGICAL HISTORY  2020    Complete colonoscopy    UMBILICAL HERNIA REPAIR  2014    Umbilical Hernia Repair       Family History  Family History   Problem Relation Name Age of Onset    HOLLY disease Maternal Grandfather      Newman's esophagus Maternal Grandfather      Asthma Other Aunt         Social History   reports that she has never smoked. She has never used smokeless tobacco. She reports current alcohol use. She reports that she does not use drugs.     Allergies  Fentanyl and Opioids - morphine analogues    Medications    Current Outpatient Medications:     cetirizine (ZyrTEC) 10 mg tablet, TAKE 1 TABLET BY MOUTH ONCE DAILY AS NEEDED FOR ALLERGIES, Disp: 30 tablet, Rfl: 11    cyanocobalamin (Vitamin B-12) 1,000 mcg tablet, Take 1 tablet (1,000 mcg) by mouth once daily., Disp: , Rfl:     diphenhydrAMINE  (BENADryl) 25 mg capsule, Take 1 capsule (25 mg) by mouth if needed for other (migraine) for up to 5 days., Disp: 5 capsule, Rfl: 0    escitalopram (Lexapro) 5 mg tablet, Take 1 tablet (5 mg) by mouth once daily., Disp: 90 tablet, Rfl: 1    fluticasone (Flonase) 50 mcg/actuation nasal spray, Administer 1 spray into each nostril once daily. Shake gently. Before first use, prime pump. After use, clean tip and replace cap., Disp: 16 g, Rfl: 11    fluticasone propion-salmeteroL (Advair Diskus) 500-50 mcg/dose diskus inhaler, Inhale 1 puff 2 times a day. Rinse mouth with water after use to reduce aftertaste and incidence of candidiasis. Do not swallow., Disp: 60 each, Rfl: 11    ipratropium-albuteroL (Duo-Neb) 0.5-2.5 mg/3 mL nebulizer solution, Take 3 mL by nebulization 4 times a day as needed for wheezing., Disp: 720 mL, Rfl: 3    magnesium oxide 500 mg magnesium tablet, Take 1 tablet (500 mg) by mouth once daily., Disp: , Rfl:     montelukast (Singulair) 10 mg tablet, Take 1 tablet (10 mg) by mouth once daily., Disp: 90 tablet, Rfl: 3    pantoprazole (ProtoNix) 40 mg EC tablet, Take 1 tablet by mouth twice daily, Disp: 60 tablet, Rfl: 3    Spiriva Respimat 2.5 mcg/actuation inhaler, INHALE 2 SPRAY(S) BY MOUTH ONCE DAILY, Disp: 4 g, Rfl: 6    dupilumab (Dupixent) 300 mg/2 mL pen injector, Inject 2 mL (300 mg) under the skin every 14 (fourteen) days. (Patient not taking: Reported on 2/19/2025), Disp: 12 mL, Rfl: 3    linaCLOtide (Linzess) 145 mcg capsule, Take 1 capsule (145 mcg) by mouth once daily in the morning. Take before meals. Do not crush or chew. (Patient not taking: Reported on 2/19/2025), Disp: 30 capsule, Rfl: 1    ondansetron (Zofran) 8 mg tablet, Take 1 tablet (8 mg) by mouth every 8 hours if needed for nausea (headache). (Patient not taking: Reported on 1/14/2025), Disp: 30 tablet, Rfl: 1    rizatriptan (Maxalt) 10 mg tablet, Take 1 tablet (10 mg) by mouth 1 time if needed for migraine. May repeat in 2  hours if unresolved. Do not exceed 30 mg in 24 hours. (Patient not taking: Reported on 1/14/2025), Disp: 30 tablet, Rfl: 3    topiramate (Topamax) 25 mg tablet, 2 po qam and 1 po qhs (Patient not taking: Reported on 2/19/2025), Disp: 270 tablet, Rfl: 3      Last Recorded Vitals   There were no vitals taken for this visit.    Objective:  /80 (BP Location: Left arm, Patient Position: Sitting, BP Cuff Size: Adult)   Pulse 105   Temp 36.2 °C (97.1 °F) (Temporal)   Ht 1.524 m (5')   Wt 89 kg (196 lb 4.8 oz)   BMI 38.34 kg/m²     Gen: NAD  Neuro:  --HIF:   Mini-Mental Status Exam:  Orientation to Time (Year, Season, Month, Date, Day of Week): 5  Orientation to Place (State, County, City, Name of Place, Floor):  5  Registration (Apple, Table, [place]): 3  Attention (Spell 'World' backwards): 5  Delayed Verbal Recall: 2  Naming (Watch, Pen): 2  Repetition (No Ifs, Ands, or Buts): 1  Follows command with crossover: 3  Read a sentence: 1  Write a sentence: 1  Copy a figure: 1  Total Score: 29    --CN:  PERRLA, EOMI, VFF, no visible facial asymmetry, facial sensation intact, no tongue or palatal deviation, SCM intact  --Motor: Moves all 4 extremities equally; no focal deficits  --Sensory: Intact to light touch, intact to pinprick  --Reflex: 2+ symmetric, toes down  --Cerebellum: FTN and HTS intact  --Gait: Normal, narrow based.  Toe and Heal Walking Intact.  Tandem Intact    Relevant Results  Lab Results   Component Value Date    WBC 9.0 09/23/2024    HGB 13.2 09/23/2024    HCT 39.3 09/23/2024    MCV 92 09/23/2024     09/23/2024       Lab Results   Component Value Date    GLUCOSE 114 (H) 09/23/2024    CALCIUM 9.4 09/23/2024     09/23/2024    K 4.4 09/23/2024    CO2 25 09/23/2024     (H) 09/23/2024    BUN 12 09/23/2024    CREATININE 0.81 09/23/2024       Lab Results   Component Value Date    HGBA1C 5.4 09/23/2024       Lab Results   Component Value Date    CHOL 221 (H) 09/23/2024     Lab Results  "  Component Value Date    HDL 52.0 09/23/2024     Lab Results   Component Value Date    LDLCALC 126 (H) 09/23/2024     Lab Results   Component Value Date    TRIG 213 (H) 09/23/2024     No components found for: \"CHOLHDL\"    MRI Brain (I personally reviewed the images/tracings with the following interpretation)  No evidence of acute stroke     Assessment:   Syncope  - on July 2024, she was in the passenger seat of a car; she was laughing very hard and then passed out  - ddx includes vasovagal syncope; doubt seizure but will proceed with further workup  - recommend EEG    2.  Cognitive Difficulty  - she notes word finding difficulty, trouble with multi-tasking, and short-term memory difficulty  - symptoms have improved somewhat since stopping Topamax  - on exam, her MMSE was 29/30  - recommend the following labs: B12, TSH  - recommend neuropsychological testing    3.  Migraines  - current migraine frequency is up to once per month  - okay to hold off on migraine preventative medication  - keep migraine diary      Jayme Mccoy MD  [facility]  Department of Neurology      A copy of this note was sent to the referring provider.    "

## 2025-02-20 LAB
TSH SERPL-ACNC: 1.13 MIU/L
VIT B12 SERPL-MCNC: 789 PG/ML (ref 200–1100)

## 2025-02-21 DIAGNOSIS — J45.51 SEVERE PERSISTENT ASTHMA WITH ACUTE EXACERBATION (MULTI): ICD-10-CM

## 2025-02-21 RX ORDER — FLUTICASONE PROPIONATE AND SALMETEROL 500; 50 UG/1; UG/1
1 POWDER RESPIRATORY (INHALATION)
Qty: 60 EACH | Refills: 11 | Status: SHIPPED | OUTPATIENT
Start: 2025-02-21 | End: 2026-02-21

## 2025-03-03 ENCOUNTER — CLINICAL SUPPORT (OUTPATIENT)
Dept: GASTROENTEROLOGY | Facility: CLINIC | Age: 50
End: 2025-03-03
Payer: COMMERCIAL

## 2025-03-03 DIAGNOSIS — K76.0 FATTY LIVER: ICD-10-CM

## 2025-03-03 PROCEDURE — 91200 LIVER ELASTOGRAPHY: CPT | Performed by: INTERNAL MEDICINE

## 2025-03-03 NOTE — LETTER
March 5, 2025     Solomon Strong MD  [address]  [facility] [address]    Patient: Rema Silver   YOB: 1975   Date of Visit: 3/3/2025       Dear Dr. Solomon Strong MD:    Thank you for referring Rema Silver for evaluation with FibroScan. Below are my notes for this consultation.  If you have questions, please do not hesitate to call me.      Sincerely,     [facility] [id number] FIBROSCAN      CC: No Recipients  ______________________________________________________________________________________      Results:  E (median liver stiffness measurement):   3.9  kPa  CAP (controlled attenuation parameter):  268  dB/m    Interpretation:  This was a technically adequate study. 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 The CAP score is consistent with 0 - 4% hepatocyte steatosis (steatosis grade 0 of 3) .    Luis M Benavidez MD  Hepatology

## 2025-03-05 NOTE — PROGRESS NOTES
Results:  E (median liver stiffness measurement):   3.9  kPa  CAP (controlled attenuation parameter):  268  dB/m    Interpretation:  This was a technically adequate study. 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 The CAP score is consistent with 0 - 4% hepatocyte steatosis (steatosis grade 0 of 3) .    Luis M Benavidez MD  Hepatology

## 2025-03-06 NOTE — RESULT ENCOUNTER NOTE
Good news.  FibroScan results do not show significant fibrosis or fatty infiltration.  Continue with Low-fat diet.  Sincerely,   Solomon Strong MD

## 2025-03-08 DIAGNOSIS — J45.50 SEVERE PERSISTENT ASTHMA WITHOUT COMPLICATION (MULTI): ICD-10-CM

## 2025-03-10 RX ORDER — MONTELUKAST SODIUM 10 MG/1
10 TABLET ORAL DAILY
Qty: 90 TABLET | Refills: 3 | Status: SHIPPED | OUTPATIENT
Start: 2025-03-10

## 2025-03-14 NOTE — PROGRESS NOTES
Subjective   Patient ID: Rema Silver is a 49 y.o. female who presents for GERD (Review BRAVO results).  HPI  Follow up visit :   GERD :   EGD with BRAVO :   Overall this is study is not accurate as patient continued her PPI during the study but her AET was 6.4% which could be considered abnormal given she was on PPI  No symptoms reported  Clinical correlation recommended     She is trying to loose wt.   She is stable with regards to her symptoms.     Fibroscan: Normal.     On pnatoprazole 40mg oral twice daily. Taking 80m goral daily and it has worked for her.       She has h/o prednisone >>     Current Outpatient Medications on File Prior to Visit   Medication Sig Dispense Refill    cetirizine (ZyrTEC) 10 mg tablet TAKE 1 TABLET BY MOUTH ONCE DAILY AS NEEDED FOR ALLERGIES 30 tablet 11    cyanocobalamin (Vitamin B-12) 1,000 mcg tablet Take 1 tablet (1,000 mcg) by mouth once daily.      diphenhydrAMINE (BENADryl) 25 mg capsule Take 1 capsule (25 mg) by mouth if needed for other (migraine) for up to 5 days. 5 capsule 0    escitalopram (Lexapro) 5 mg tablet Take 1 tablet (5 mg) by mouth once daily. 90 tablet 1    fluticasone (Flonase) 50 mcg/actuation nasal spray Administer 1 spray into each nostril once daily. Shake gently. Before first use, prime pump. After use, clean tip and replace cap. 16 g 11    fluticasone propion-salmeteroL (Advair Diskus) 500-50 mcg/dose diskus inhaler Inhale 1 puff 2 times a day. Rinse mouth with water after use to reduce aftertaste and incidence of candidiasis. Do not swallow. 60 each 11    ipratropium-albuteroL (Duo-Neb) 0.5-2.5 mg/3 mL nebulizer solution Take 3 mL by nebulization 4 times a day as needed for wheezing. 720 mL 3    magnesium oxide 500 mg magnesium tablet Take 1 tablet (500 mg) by mouth once daily.      montelukast (Singulair) 10 mg tablet Take 1 tablet by mouth once daily 90 tablet 3    pantoprazole (ProtoNix) 40 mg EC tablet Take 1 tablet by mouth twice daily 60  "tablet 3    Spiriva Respimat 2.5 mcg/actuation inhaler INHALE 2 SPRAY(S) BY MOUTH ONCE DAILY 4 g 6    venlafaxine XR (Effexor XR) 37.5 mg 24 hr capsule Take 1 capsule (37.5 mg) by mouth once daily. Do not crush or chew. 7 capsule 0    venlafaxine XR (Effexor XR) 75 mg 24 hr capsule Take 1 capsule (75 mg) by mouth once daily. Do not crush or chew. 30 capsule 11    dupilumab (Dupixent) 300 mg/2 mL pen injector Inject 2 mL (300 mg) under the skin every 14 (fourteen) days. (Patient not taking: Reported on 3/20/2025) 12 mL 3     No current facility-administered medications on file prior to visit.        Review of Systems   Constitutional: Negative.    Respiratory: Negative.     Cardiovascular: Negative.    Endocrine: Negative.    Genitourinary: Negative.    Skin: Negative.    Neurological: Negative.        Objective   Physical Exam  Constitutional:       Appearance: Normal appearance.   HENT:      Head: Normocephalic and atraumatic.   Cardiovascular:      Rate and Rhythm: Normal rate and regular rhythm.      Pulses: Normal pulses.      Heart sounds: Normal heart sounds.   Pulmonary:      Effort: Pulmonary effort is normal.      Breath sounds: Normal breath sounds.   Abdominal:      General: Abdomen is flat. Bowel sounds are normal.      Palpations: Abdomen is soft.      Tenderness: There is no abdominal tenderness.   Musculoskeletal:         General: Normal range of motion.      Cervical back: Normal range of motion.   Skin:     General: Skin is warm.   Neurological:      Mental Status: She is alert.       /82 (BP Location: Left arm, Patient Position: Sitting, BP Cuff Size: Large adult)   Pulse 88   Resp 18   Ht 1.524 m (5')   Wt 88.4 kg (194 lb 14.4 oz)   LMP  (LMP Unknown)   SpO2 98%   BMI 38.06 kg/m²      Lab Results   Component Value Date    WBC 9.0 09/23/2024    HGB 13.2 09/23/2024    HCT 39.3 09/23/2024    MCV 92 09/23/2024     09/23/2024           No lab exists for component: \"LABALBU\"    No " "results found for: \"AFP\"  Lab Results   Component Value Date    TSH 1.13 02/19/2025     Liver Elastography (Fibroscan)  Order# [id number]  Reading physician: Luis M Benavidez MD Ordering provider: Solomon Strong MD Study date: 3/3/25     Result Information    Status: Final result (Exam End: 3/5/2025 07:16) Provider Status: Reviewed     Result Narrative    Impression  Overall Impression:    Interpretation:  This was a technically adequate study. The Fibrosis score is consistent  with Metavir F0 . The CAP score is consistent with 0 - 4% hepatocyte  steatosis (steatosis grade 0 of 3) .    Luis M Benavidez MD  Hepatology    Recommendation  Follow up with the referring Gastroenterologist, Dr. Solomon Strong.    Indication  Fatty liver    Preprocedure  A history and physical has been performed by the referring clinician.    Details of the Procedure  Refer to the scanned FibroScan report.    Findings  Results:  E (median liver stiffness measurement):   3.9  kPa  CAP (controlled attenuation parameter):  268  dB/m    Specimens  None    Assessment/Plan   Diagnoses and all orders for this visit:  Gastroesophageal reflux disease, unspecified whether esophagitis present  Continue with Pantoprazole at current dose. 80mg oral daily.   Lifestyle modifications to reduce acid reflux.   Follow up in 1 year.   Or earlier as needed.            "

## 2025-03-18 NOTE — PROGRESS NOTES
Division of Minimally Invasive Gynecologic Surgery  [facility]    03/19/25 Gynecology Visit    CC:   Chief Complaint   Patient presents with    Consult     Pt is here for consult. No chaperone required.  Renate Crawford MA         Rema Silver is a 49 y.o. who presents for evaluation of pelvic pain and hot flashes    States ~ 6 weeks ago she developed severe hot flashes/night sweats. Worse at night. Happens ~ every 30 minutes and keeps her from sleeping. Causes her immune system to drop which causes her asthma/sinus issues to have severe exacerbation.   States 1 week ago she developed severe central pelvic pain that lasted 4-5 days but has since resolved and having no pain. States she had episodes of diverticulitis which is typically more left sided and is unsure if it could be that. Questioning if it could have been the cyst. Denies any vaginal bleeding. She has not had any bleeding since October 2024.    Since her last visit she has had no vaginal bleeding or pain and feels well.       Imaging:   TVUS 2024: The uterus measures 10.4 x 4.2 x 5.4 cm. The endometrium measures 0.7 cm. Left Simple cyst measuring 2.3 x 2.3 x 2.5 cm.   2021 TVUS-possible adenomyosis  Labs: H/H 13.2/39.3   TSH wnl  2020: Emb benign    GYN Hx  Gynecologic history:  Contraception/menstrual regulation: none  Desires Childbearing: denies  Last pap: NILM neg HPV 2020  Her last mammogram was BI-RADS 2 in 2024.   Colonoscopy: completed 2021  Denies family history of breast, ovarian, uterine, colon or endometrial cancer.       OBHx: c/s x2  Pertinent PMH: asthma, H/o NSTEMI listed-pt adamantly denies, Diverticulitis, h/o PE following knee surgery  Pertient PSH:   - endometriosis: lpsc surgery x 3  - c/sx2  - umbilical hernia repair with mesh  - breast reduction    PMHx, PSHx, SHx, Allergies, and Medications updated in Epic.  Past Medical History:   Diagnosis Date    Acute non-ST elevation myocardial  infarction (NSTEMI) (Multi) 2018    Asthma     Contact with and (suspected) exposure to covid-19 2023    Cough, unspecified 2022    Disease due to severe acute respiratory syndrome coronavirus 2 (SARS-CoV-2) 2024    Diverticulitis     Edema 2024    Edema of both lower extremities 2024    Essential (primary) hypertension 09/10/2022    Migraines     Personal history of COVID-19 2022    Personal history of other diseases of the respiratory system     History of acute bronchitis    Plantar fasciitis, left 2018    PONV (postoperative nausea and vomiting)     Sprain of left foot 04/10/2018    Sprain of medial collateral ligament of knee 2020     Past Surgical History:   Procedure Laterality Date    BREAST SURGERY  2014    Breast Surgery Reduction Procedure     SECTION, CLASSIC  2014     Section    ESOPHAGOGASTRODUODENOSCOPY  2014    Diagnostic Esophagogastroduodenoscopy    LAPAROSCOPY DIAGNOSTIC / BIOPSY / ASPIRATION / LYSIS  2014    Exploratory Laparoscopy    OTHER SURGICAL HISTORY  2020    Complete colonoscopy    UMBILICAL HERNIA REPAIR  2014    Umbilical Hernia Repair     Allergies   Allergen Reactions    Fentanyl Respiratory depression     Happened alone, unsure if this was the reaction    Opioids - Morphine Analogues Nausea/vomiting       Current Outpatient Medications:     cetirizine (ZyrTEC) 10 mg tablet, TAKE 1 TABLET BY MOUTH ONCE DAILY AS NEEDED FOR ALLERGIES, Disp: 30 tablet, Rfl: 11    cyanocobalamin (Vitamin B-12) 1,000 mcg tablet, Take 1 tablet (1,000 mcg) by mouth once daily., Disp: , Rfl:     diphenhydrAMINE (BENADryl) 25 mg capsule, Take 1 capsule (25 mg) by mouth if needed for other (migraine) for up to 5 days., Disp: 5 capsule, Rfl: 0    escitalopram (Lexapro) 5 mg tablet, Take 1 tablet (5 mg) by mouth once daily., Disp: 90 tablet, Rfl: 1    fluticasone (Flonase) 50 mcg/actuation nasal spray,  Administer 1 spray into each nostril once daily. Shake gently. Before first use, prime pump. After use, clean tip and replace cap., Disp: 16 g, Rfl: 11    fluticasone propion-salmeteroL (Advair Diskus) 500-50 mcg/dose diskus inhaler, Inhale 1 puff 2 times a day. Rinse mouth with water after use to reduce aftertaste and incidence of candidiasis. Do not swallow., Disp: 60 each, Rfl: 11    ipratropium-albuteroL (Duo-Neb) 0.5-2.5 mg/3 mL nebulizer solution, Take 3 mL by nebulization 4 times a day as needed for wheezing., Disp: 720 mL, Rfl: 3    magnesium oxide 500 mg magnesium tablet, Take 1 tablet (500 mg) by mouth once daily., Disp: , Rfl:     montelukast (Singulair) 10 mg tablet, Take 1 tablet by mouth once daily, Disp: 90 tablet, Rfl: 3    pantoprazole (ProtoNix) 40 mg EC tablet, Take 1 tablet by mouth twice daily, Disp: 60 tablet, Rfl: 3    Spiriva Respimat 2.5 mcg/actuation inhaler, INHALE 2 SPRAY(S) BY MOUTH ONCE DAILY, Disp: 4 g, Rfl: 6    dupilumab (Dupixent) 300 mg/2 mL pen injector, Inject 2 mL (300 mg) under the skin every 14 (fourteen) days. (Patient not taking: Reported on 2/19/2025), Disp: 12 mL, Rfl: 3    venlafaxine XR (Effexor XR) 37.5 mg 24 hr capsule, Take 1 capsule (37.5 mg) by mouth once daily. Do not crush or chew., Disp: 7 capsule, Rfl: 0    venlafaxine XR (Effexor XR) 75 mg 24 hr capsule, Take 1 capsule (75 mg) by mouth once daily. Do not crush or chew., Disp: 30 capsule, Rfl: 11  Social History     Socioeconomic History    Marital status:      Spouse name: Not on file    Number of children: Not on file    Years of education: Not on file    Highest education level: Not on file   Occupational History    Not on file   Tobacco Use    Smoking status: Never    Smokeless tobacco: Never   Vaping Use    Vaping status: Never Used   Substance and Sexual Activity    Alcohol use: Yes     Comment: very rarely    Drug use: Never    Sexual activity: Not on file   Other Topics Concern    Not on file    Social History Narrative    Not on file     Social Drivers of Health     Financial Resource Strain: Low Risk  (2024)    Overall Financial Resource Strain (CARDIA)     Difficulty of Paying Living Expenses: Not hard at all   Food Insecurity: Not on file   Transportation Needs: No Transportation Needs (2024)    PRAPARE - Transportation     Lack of Transportation (Medical): No     Lack of Transportation (Non-Medical): No   Physical Activity: Not on file   Stress: Not on file   Social Connections: Not on file   Intimate Partner Violence: Not on file   Housing Stability: Low Risk  (2024)    Housing Stability Vital Sign     Unable to Pay for Housing in the Last Year: No     Number of Times Moved in the Last Year: 1     Homeless in the Last Year: No     Family History   Problem Relation Name Age of Onset    HOLLY disease Maternal Grandfather      Newman's esophagus Maternal Grandfather      Asthma Other Aunt      OB History          2    Para   2    Term   2            AB        Living             SAB        IAB        Ectopic        Multiple        Live Births                        ROS: reviewed and negative    PE:/81   Wt 89.8 kg (198 lb)   LMP  (LMP Unknown)   BMI 38.67 kg/m²   Gen: NAD  Psych: AAOx3  Skin: no lesions  Pulm: nonlabored breathing, normal respiratory effort  Cards: normal peripheral perfusion  Lymph: no LAD in axilla or groin  GI: soft, non-tender, non-distended, no rebound/guarding, no masses  : deferred      A/P: Rema Silver is a 49 y.o. with vasomotor symptoms and an incidental ovarian cyst    Vasomotor symptoms  - cannot have HRT due to h/o PE  - discussed alternative options including gabapentin, SNRI/SSRI, veozah including risk/benefits.   - pt desires to try effexor. Currently on lexapro. Will taper off lexapro and then start effexor  - Rx 37.5 mg x 1 week followed by 75 mg daily    Pelvic pain/ovarian cyst  - simple cyst decreasing in size  - had ~4-5  days of abrupt onset of pain followed by resolution. No pain currently  - possibly due to ruptured cyst  - will repeat Us if recurrence of pin        Bita Pugh MD  Division of Minimally Invasive Gynecologic Surgery  [facility]

## 2025-03-19 ENCOUNTER — APPOINTMENT (OUTPATIENT)
Dept: OBSTETRICS AND GYNECOLOGY | Facility: CLINIC | Age: 50
End: 2025-03-19
Payer: COMMERCIAL

## 2025-03-19 VITALS — BODY MASS INDEX: 38.67 KG/M2 | DIASTOLIC BLOOD PRESSURE: 81 MMHG | SYSTOLIC BLOOD PRESSURE: 122 MMHG | WEIGHT: 198 LBS

## 2025-03-19 DIAGNOSIS — N95.1 VASOMOTOR SYMPTOMS DUE TO MENOPAUSE: Primary | ICD-10-CM

## 2025-03-19 DIAGNOSIS — R10.2 FEMALE PELVIC PAIN: ICD-10-CM

## 2025-03-19 PROCEDURE — 99213 OFFICE O/P EST LOW 20 MIN: CPT | Performed by: STUDENT IN AN ORGANIZED HEALTH CARE EDUCATION/TRAINING PROGRAM

## 2025-03-19 PROCEDURE — 1036F TOBACCO NON-USER: CPT | Performed by: STUDENT IN AN ORGANIZED HEALTH CARE EDUCATION/TRAINING PROGRAM

## 2025-03-19 RX ORDER — VENLAFAXINE HYDROCHLORIDE 75 MG/1
75 CAPSULE, EXTENDED RELEASE ORAL DAILY
Qty: 30 CAPSULE | Refills: 11 | Status: SHIPPED | OUTPATIENT
Start: 2025-03-19 | End: 2026-03-19

## 2025-03-19 RX ORDER — VENLAFAXINE HYDROCHLORIDE 37.5 MG/1
37.5 CAPSULE, EXTENDED RELEASE ORAL DAILY
Qty: 7 CAPSULE | Refills: 0 | Status: SHIPPED | OUTPATIENT
Start: 2025-03-19 | End: 2026-03-19

## 2025-03-19 ASSESSMENT — PAIN SCALES - GENERAL: PAINLEVEL_OUTOF10: 0-NO PAIN

## 2025-03-20 ENCOUNTER — APPOINTMENT (OUTPATIENT)
Dept: GASTROENTEROLOGY | Facility: CLINIC | Age: 50
End: 2025-03-20
Payer: COMMERCIAL

## 2025-03-20 VITALS
HEART RATE: 88 BPM | BODY MASS INDEX: 38.27 KG/M2 | OXYGEN SATURATION: 98 % | RESPIRATION RATE: 18 BRPM | WEIGHT: 194.9 LBS | HEIGHT: 60 IN | SYSTOLIC BLOOD PRESSURE: 130 MMHG | DIASTOLIC BLOOD PRESSURE: 82 MMHG

## 2025-03-20 DIAGNOSIS — K21.9 GASTROESOPHAGEAL REFLUX DISEASE, UNSPECIFIED WHETHER ESOPHAGITIS PRESENT: Primary | ICD-10-CM

## 2025-03-20 PROCEDURE — 3008F BODY MASS INDEX DOCD: CPT | Performed by: INTERNAL MEDICINE

## 2025-03-20 PROCEDURE — 99214 OFFICE O/P EST MOD 30 MIN: CPT | Performed by: INTERNAL MEDICINE

## 2025-03-20 NOTE — PATIENT INSTRUCTIONS
Gastroesophageal reflux disease, unspecified whether esophagitis present  Continue with Pantoprazole at current dose.   Lifestyle modifications to reduce acid reflux.   Follow up in 1 year.

## 2025-03-30 ASSESSMENT — ENCOUNTER SYMPTOMS
CONSTITUTIONAL NEGATIVE: 1
CARDIOVASCULAR NEGATIVE: 1
RESPIRATORY NEGATIVE: 1
ENDOCRINE NEGATIVE: 1
NEUROLOGICAL NEGATIVE: 1

## 2025-04-06 DIAGNOSIS — K21.9 GASTROESOPHAGEAL REFLUX DISEASE WITHOUT ESOPHAGITIS: ICD-10-CM

## 2025-04-07 RX ORDER — PANTOPRAZOLE SODIUM 40 MG/1
40 TABLET, DELAYED RELEASE ORAL 2 TIMES DAILY
Qty: 60 TABLET | Refills: 3 | Status: SHIPPED | OUTPATIENT
Start: 2025-04-07

## 2025-04-08 ENCOUNTER — APPOINTMENT (OUTPATIENT)
Facility: CLINIC | Age: 50
End: 2025-04-08
Payer: COMMERCIAL

## 2025-04-08 VITALS
SYSTOLIC BLOOD PRESSURE: 120 MMHG | DIASTOLIC BLOOD PRESSURE: 74 MMHG | HEIGHT: 60 IN | WEIGHT: 246.4 LBS | RESPIRATION RATE: 18 BRPM | BODY MASS INDEX: 48.38 KG/M2 | OXYGEN SATURATION: 96 % | HEART RATE: 86 BPM

## 2025-04-08 DIAGNOSIS — J45.50 SEVERE PERSISTENT ASTHMA WITHOUT COMPLICATION (MULTI): Primary | ICD-10-CM

## 2025-04-08 PROCEDURE — G8433 SCR FOR DEP NOT CPT DOC RSN: HCPCS | Performed by: INTERNAL MEDICINE

## 2025-04-08 PROCEDURE — 3008F BODY MASS INDEX DOCD: CPT | Performed by: INTERNAL MEDICINE

## 2025-04-08 PROCEDURE — 99213 OFFICE O/P EST LOW 20 MIN: CPT | Performed by: INTERNAL MEDICINE

## 2025-04-08 ASSESSMENT — PATIENT HEALTH QUESTIONNAIRE - PHQ9
SUM OF ALL RESPONSES TO PHQ9 QUESTIONS 1 AND 2: 0
2. FEELING DOWN, DEPRESSED OR HOPELESS: NOT AT ALL
1. LITTLE INTEREST OR PLEASURE IN DOING THINGS: NOT AT ALL

## 2025-04-08 ASSESSMENT — COLUMBIA-SUICIDE SEVERITY RATING SCALE - C-SSRS
1. IN THE PAST MONTH, HAVE YOU WISHED YOU WERE DEAD OR WISHED YOU COULD GO TO SLEEP AND NOT WAKE UP?: NO
2. HAVE YOU ACTUALLY HAD ANY THOUGHTS OF KILLING YOURSELF?: NO
6. HAVE YOU EVER DONE ANYTHING, STARTED TO DO ANYTHING, OR PREPARED TO DO ANYTHING TO END YOUR LIFE?: NO

## 2025-04-08 ASSESSMENT — ENCOUNTER SYMPTOMS
OCCASIONAL FEELINGS OF UNSTEADINESS: 0
LOSS OF SENSATION IN FEET: 0
DEPRESSION: 0

## 2025-04-08 ASSESSMENT — ASTHMA QUESTIONNAIRES
QUESTION_1 LAST FOUR WEEKS HOW MUCH OF THE TIME DID YOUR ASTHMA KEEP YOU FROM GETTING AS MUCH DONE AT WORK, SCHOOL OR AT HOME: NONE OF THE TIME
ACT_TOTALSCORE: 25
QUESTION_2 LAST FOUR WEEKS HOW OFTEN HAVE YOU HAD SHORTNESS OF BREATH: NOT AT ALL
QUESTION_3 LAST FOUR WEEKS HOW OFTEN DID YOUR ASTHMA SYMPTOMS (WHEEZING, COUGHING, SHORTNESS OF BREATH, CHEST TIGHTNESS OR PAIN) WAKE YOU UP AT NIGHT OR EARLIER THAN USUAL IN THE MORNING: NOT AT ALL
QUESTION_4 LAST FOUR WEEKS HOW OFTEN HAVE YOU USED YOUR RESCUE INHALER OR NEBULIZER MEDICATION (SUCH AS ALBUTEROL): NOT AT ALL
QUESTION_5 LAST FOUR WEEKS HOW WOULD YOU RATE YOUR ASTHMA CONTROL: COMPLETELY CONTROLLED

## 2025-04-09 NOTE — PROGRESS NOTES
Department of Medicine  Division of Pulmonary, Critical Care, and Sleep Medicine  Follow-Up Visit  [facility] - [address], [address]    Physician HPI:  Mrs Silver is a 48-year-old female with past medical history significant for severe persistent asthma who presented to the office today for follow up.  Rema reports severe persistent symptoms over the past few weeks despite recent course of prolonged corticosteroid taper.  She was tested positive for COVID about 3 weeks ago.  Denies high-grade fevers or hypoxemia.  She has frequent cough and severe chest congestion with audible wheezing at times.  Symptoms are not quite improving with nebulized albuterol treatment.  She has been using albuterol more than 4-5 times per day.  She is currently on prednisone 10 mg daily.  She continues to use ICS/LABA/LAMA inhaler and montelukast.    Rema has history of severe persistent asthma for which she was started on Tezspire injections about 1 year ago.  This is her first severe asthma exacerbation since she was started on Tezspire.  She was noted to have mild vocal cord dysfunction on ENT exam several years ago.  Has not seen ENT in the recent past.    ROS: No fevers or chills.  No acute chest pain.  Has nasal and sinus congestion.  Cough with inhalation.  No acute GI symptoms.  No acute skin rash or inflammatory arthritis now.  No orthopnea or lower extremity edema.    Follow up 1/23/2024:  No significant change in respiratory status since last visit last week. Feels tired and fatigued from persistent asthma symptoms and being on high dose of corticosteroids for several weeks now. No recent hx of high grade fevers. Oxygenation is stable on RA.    Follow up 03/14/2024:  Rema presented today for follow-up after recent hospital admission for acute asthma exacerbation.  She is feeling better though she is not back to her baseline yet.  She is on a taper course of prednisone.  She was seen by ENT recently and exam did not  reveal significant VCD.    Follow up 05/21/2024:  Rema reports to feel slightly better after a prolonged persistent asthma that required frequent courses of systemic corticosteroids. ++ fatigue. Cough is intermittent but could still occur in spells at times. On ICS/LABA/LAMA.     Follow up 01/08/2025:  Respiratory status is stable now. + fatigue. No persistent cough or wheezing. No nocturnal symptoms.   We discussed recent hx of severe asthma exacerbation despite anti TSLP biologics.     Follow up 04/08/2025:  Rema reports stable respiratory status since last visit.  She did not need to use short acting bronchodilators.  No interval history of acute asthma exacerbation or acute respiratory infections.  She is currently off Biologics.      Immunization History   Administered Date(s) Administered    Flu vaccine (IIV4), preservative free *Check age/dose* 09/21/2018, 12/05/2022    Flu vaccine, quadrivalent, recombinant, preservative free, adult (FLUBLOK) 11/01/2024    Influenza, Unspecified 12/04/2009    Influenza, injectable, quadrivalent 10/14/2019, 11/06/2020, 10/25/2021    Influenza, seasonal, injectable 12/04/2009, 10/21/2020, 11/03/2021, 12/05/2022    Moderna SARS-CoV-2 Booster 11/01/2024    Moderna SARS-CoV-2 Vaccination 04/27/2021, 05/27/2021, 12/15/2021    Pneumococcal polysaccharide vaccine, 23-valent, age 2 years and older (PNEUMOVAX 23) 10/14/2019    Tdap vaccine, age 7 year and older (BOOSTRIX, ADACEL) 09/21/2018, 09/26/2019       Current Outpatient Medications   Medication Instructions    cetirizine (ZyrTEC) 10 mg tablet TAKE 1 TABLET BY MOUTH ONCE DAILY AS NEEDED FOR ALLERGIES    cyanocobalamin (Vitamin B-12) 1,000 mcg tablet 1 tablet, Daily    diphenhydrAMINE (BENADRYL) 25 mg, oral, As needed    dupilumab (DUPIXENT) 300 mg, subcutaneous, Every 14 days    escitalopram (LEXAPRO) 5 mg, oral, Daily    fluticasone (Flonase) 50 mcg/actuation nasal spray 1 spray, Each Nostril, Daily, Shake gently. Before  first use, prime pump. After use, clean tip and replace cap.    fluticasone propion-salmeteroL (Advair Diskus) 500-50 mcg/dose diskus inhaler 1 puff, inhalation, 2 times daily RT, Rinse mouth with water after use to reduce aftertaste and incidence of candidiasis. Do not swallow.    ipratropium-albuteroL (Duo-Neb) 0.5-2.5 mg/3 mL nebulizer solution 3 mL, nebulization, 4 times daily PRN    magnesium oxide 500 mg magnesium tablet 1 tablet, Daily    montelukast (SINGULAIR) 10 mg, oral, Daily    pantoprazole (PROTONIX) 40 mg, oral, 2 times daily    Spiriva Respimat 2.5 mcg/actuation inhaler INHALE 2 SPRAY(S) BY MOUTH ONCE DAILY    venlafaxine XR (EFFEXOR XR) 37.5 mg, oral, Daily, Do not crush or chew.    venlafaxine XR (EFFEXOR XR) 75 mg, oral, Daily, Do not crush or chew.        Allergies:  Allergies   Allergen Reactions    Fentanyl Respiratory depression     Happened alone, unsure if this was the reaction    Opioids - Morphine Analogues Nausea/vomiting          Visit Vitals  /74   Pulse 86   Resp 18   Ht 1.524 m (5')   Wt 112 kg (246 lb 6.4 oz)   LMP  (LMP Unknown)   SpO2 96%   BMI 48.12 kg/m²   OB Status Having periods   Smoking Status Never   BSA 2.18 m²        Physical Exam  Vitals and nursing note reviewed.   Constitutional:       General: She is not in acute distress.     Appearance: Normal appearance.   HENT:      Head: Normocephalic and atraumatic.   Eyes:      Conjunctiva/sclera: Conjunctivae normal.   Pulmonary:      Effort: Pulmonary effort is normal. No respiratory distress.   Skin:     Coloration: Skin is not jaundiced.   Neurological:      General: No focal deficit present.      Mental Status: She is alert and oriented to person, place, and time. Mental status is at baseline.   Psychiatric:         Mood and Affect: Mood normal.         Behavior: Behavior normal.         Judgment: Judgment normal.            Chest Radiograph     XR chest 1 view 08/13/2023    Narrative  Interpreted By:  VINNIE  MD KIRSTIN  MRN: [id number]  Patient Name: SUJIT PEREA    STUDY:  CHEST 1 VIEW;  8/13/2023 3:58 pm    INDICATION:  Chest Pain .    COMPARISON:  March 25, 2023 chest CT and chest radiograph    ACCESSION NUMBER(S):  [id number]    ORDERING CLINICIAN:  HUGH FRANCISCO    FINDINGS:  AP radiograph of the chest was provided.        CARDIOMEDIASTINAL SILHOUETTE:  Cardiomediastinal silhouette is normal in size and configuration.    LUNGS:  Lungs are clear.    ABDOMEN:  No remarkable upper abdominal findings.    BONES:  No acute osseous changes.    Impression  1.  No evidence of acute cardiopulmonary process.        MACRO:  None      XR chest 1 view     Narrative  Interpreted By:  NO MARTINEZ MD  MRN: [id number]  Patient Name: SUJIT PEREA    STUDY:  CHEST 1 VIEW;  3/25/2023 11:08 am    INDICATION:  Chest Pain .    COMPARISON:  12/18/2022    ACCESSION NUMBER(S):  [id number]    ORDERING CLINICIAN:  MARY GROSSMAN    FINDINGS:  The heart is not enlarged. No infiltrate, pleural effusion or  pneumothorax is seen.    Impression  No active cardiopulmonary disease.      Echocardiogram     No results found for this or any previous visit from the past 365 days.       Chest CT Scan     CT angio chest for pulmonary embolism 08/13/2023    Narrative  Interpreted By:  EDEN CATALAN MD  MRN: [id number]  Patient Name: SUJIT PEREA    STUDY:  CT ANGIO CHEST FOR PE;  8/13/2023 5:36 pm    INDICATION:  chest pain, SOB, lower extremity swelling .    COMPARISON:  None.    ACCESSION NUMBER(S):  [id number]    ORDERING CLINICIAN:  JAMEY PIERCE    TECHNIQUE:  Contiguous axial images of the chest were obtained after the  intravenous administration of  60 mL Omnipaque 350. Coronal and  sagittal reformatted images were obtained from the axial images. MIPS  of the chest were also performed and reviewed and from the findings  of the examination.    FINDINGS:  No axillary, mediastinal, or hilar lymphadenopathy.    The heart is normal in size. No  significant pericardial effusion. No  evidence of acute central, main, lobar or proximal segmental  pulmonary embolism.    Mild bilateral subsegmental atelectasis. No significant pleural  effusion. No pneumothorax.    Limited evaluation of the upper abdomen.  Hepatic steatosis.    Multilevel degenerative change of the thoracic spine.    Impression  No evidence of acute pulmonary embolism.    No evidence of pneumonia.      CT angio chest for pulmonary embolism     Narrative  Interpreted By:  KIRSTIN BIRMINGHAM MD  MRN: [id number]  Patient Name: SUJIT PEREA    STUDY:  CT ANGIO CHEST FOR PE;  3/25/2023 1:29 pm    INDICATION:  chest pain .    COMPARISON:    Nancy 3, 2018 chest CT, January 10, 2021, March 15, 2021 and August 24, 2022 CT pulmonary angiograms.    ACCESSION NUMBER(S):  [id number]    ORDERING CLINICIAN:  MARY GROSSMAN    TECHNIQUE:  Helical data acquisition of the chest was obtained after intravenous  administration of 60 milliliterOMNIPAQUE 350, as per PE protocol.  Images were reformatted in coronal and sagittal planes. Axial and  coronal maximum intensity projection (MIP) images were created and  reviewed.    FINDINGS:  POTENTIAL LIMITATIONS OF THE STUDY: Likely technically adequate  although image degradation related to motion, and streak artifact  contributing to image heterogeneity    HEART AND VESSELS:  Pulmonary arterial heterogeneity including bandlike hypoenhancement  left lower lobe branches centrally series 402, image 109 most  compatible with. No evident definite pulmonary embolism identified.  Main pulmonary artery and its branches are normal in caliber.    The thoracic aorta normal in course and caliber.Although, the study  is not tailored for evaluation of aorta, there is no definite  evidence of acute aortic pathology.  No coronary artery calcifications are seen. Please note, the study is  not optimized for evaluation of coronary arteries.    The cardiac chambers are not enlarged.There are  no findings to  suggest right heart strain.    There is no pericardial effusion seen.    MEDIASTINUM AND JOAN, LOWER NECK AND AXILLA:  The visualized thyroid gland is within normal limits.  No evidence of thoracic lymphadenopathy by CT criteria.  Esophagus appears within normal limits as seen.    LUNGS AND AIRWAYS:  The trachea and central airways are patent. No endobronchial lesion  is seen.    Minor increased lung heterogeneity with curvilinear ground-glass  changes at the extreme bases compatible with atelectasis. No evident  consolidative pneumonia, edema, or effusion.      UPPER ABDOMEN:  The visualized subdiaphragmatic structures demonstrate no remarkable  findings.        CHEST WALL AND OSSEOUS STRUCTURES:  Chest wall is within normal limits.  No acute osseous pathology.There are no suspicious osseous  lesions.Scattered degenerative changes involving the spine appears  similar.    Impression  1. No evidence of acute pulmonary embolism.  2. Minor increased subsegmental atelectasis.       Laboratory Studies     Lab Results   Component Value Date    WBC 9.0 09/23/2024    HGB 13.2 09/23/2024    HCT 39.3 09/23/2024    MCV 92 09/23/2024     09/23/2024      Lab Results   Component Value Date    GLUCOSE 114 (H) 09/23/2024    CALCIUM 9.4 09/23/2024     09/23/2024    K 4.4 09/23/2024    CO2 25 09/23/2024     (H) 09/23/2024    BUN 12 09/23/2024    CREATININE 0.81 09/23/2024      Lab Results   Component Value Date    ALT 24 09/23/2024    AST 18 09/23/2024    ALKPHOS 60 09/23/2024    BILITOT 0.3 09/23/2024        Protime   Date/Time Value Ref Range Status   07/23/2024 09:57 AM 11.0 9.8 - 12.8 seconds Final     INR   Date/Time Value Ref Range Status   07/23/2024 09:57 AM 1.0 0.9 - 1.1 Final       Immunocap IgE   Date/Time Value Ref Range Status   03/19/2022 12:51 PM 18.3 0.0 - 214.0 KU/L Final     Comment:      Note:  Omalizumab (Xolair, GenentOberScharrer; humanized    IgG1 antihuman IgE Fc) treatment does not     significantly interfere with the accuracy of    total IgE on the ImmunoCAP (Edvisor.[email or web address]) platform.    J Allergy Clin Immunol 2006;117:759-66).   Allergens, parasitic diseases, smoking, and   alcohol consumption have been reported to   increase levels of total IgE in serum.       Aspergillus Fumigatus IgE   Date/Time Value Ref Range Status   03/19/2022 12:51 PM <0.10 <0.35 KU/L Final     Comment:       SEE IMMUNOCAP INTERP.IGE      Total IgG   Date/Time Value Ref Range Status   01/03/2023 10:32  700 - 1,600 mg/dL Final     Comment:     MONOCLONAL PROTEINS MAY CAUSE FALSELY LOW  RESULTS IN THIS ASSAY. SERUM PROTEIN  ELECTROPHORESIS SHOULD BE DONE AS THE  FIRST TEST TO EVALUATE MONOCLONAL GAMMOPATHY.       IgA   Date/Time Value Ref Range Status   01/03/2023 10:32  70 - 400 mg/dL Final     Comment:     MONOCLONAL PROTEINS MAY CAUSE FALSELY LOW  RESULTS IN THIS ASSAY. SERUM PROTEIN  ELECTROPHORESIS SHOULD BE DONE AS THE  FIRST TEST TO EVALUATE MONOCLONAL GAMMOPATHY.       IgM   Date/Time Value Ref Range Status   01/03/2023 10:32 AM 67 40 - 230 mg/dL Final     Comment:     MONOCLONAL PROTEINS MAY CAUSE FALSELY LOW  RESULTS IN THIS ASSAY. SERUM PROTEIN  ELECTROPHORESIS SHOULD BE DONE AS THE  FIRST TEST TO EVALUATE MONOCLONAL GAMMOPATHY.             Assessment and Plan / Recommendations     Severe persistent asthma  GERD  History of vocal cord dysfunction    CT chest 1/27/2024: no evidence of parenchymal disease or bronchiectasis.    Plan:  Continue on ICS/LABA/LAMA + Montelukast.  Continue on PPI for GERD  Can use albuterol nebs every 4 hours as needed  Dupixent was not started as symptoms are controlled now.   Follow up in 6 months.     Mary Hanna MD

## 2025-04-25 NOTE — PROGRESS NOTES
"Chief Complaint   Patient presents with    Follow-up    Annual Exam     Pt here for AWV and 4 mo FUV       49 y.o. for AWV  Last visit 12/2024  Lymphedema legs and arms dx [facility]  So far doing ok this year. Had  rough year last year.     Problem List[1]      Blood pressure 118/72, pulse 74, temperature 36.3 °C (97.3 °F), height 1.549 m (5' 1\"), weight 91.6 kg (202 lb), SpO2 96%.  Body mass index is 38.17 kg/m².    Physical Examination  General: NAD. Alert.   HEENT: Normocephalic atraumatic.    Eyes: no scleral icterus. Extraocular movements intact.  Pupils equal round and reactive to light.  Ears: TM intact.  No cerumen. Hearing grossly intact.   Throat: No exudate  Neck:  Supple. No thyroid goiter.  Lymph nodes: No cervical or clavicular adenopathy  Cardiovascular: Regular rate rhythm S1-S2 normal no murmur. No carotid bruit.   Lungs: Clear to Auscultation without wheezing, rales, or rhonchi, Breath sounds symmetric. No use of accessory muscles  Abdomen:  Normoactive bowel sounds, soft, non-tender. No mass.  No organomegaly  Extremities: nonpitting edema. Cuffing arms.   Neuro: no facial asymmetry. Strength upper and lower extremities 5/5. Sensation intact to light touch. No tremor. Babinski negative  Skin: no rash noted  Vascular: DP pulses intact.   Declined chaperone. Breast: Both are symmetrical no nipple discharge.  No skin changes.  No mass palpated.  No axillary adenopathy.      Lab Results   Component Value Date    HGBA1C 5.4 09/23/2024     Lab Results   Component Value Date    GLUCOSE 114 (H) 09/23/2024    CALCIUM 9.4 09/23/2024     09/23/2024    K 4.4 09/23/2024    CO2 25 09/23/2024     (H) 09/23/2024    BUN 12 09/23/2024    CREATININE 0.81 09/23/2024     Lab Results   Component Value Date    ALT 24 09/23/2024    AST 18 09/23/2024    ALKPHOS 60 09/23/2024    BILITOT 0.3 09/23/2024     Lab Results   Component Value Date    WBC 9.0 09/23/2024    HGB 13.2 09/23/2024    HCT 39.3 " "09/23/2024    MCV 92 09/23/2024     09/23/2024     Lab Results   Component Value Date    CHOL 221 (H) 09/23/2024     Lab Results   Component Value Date    HDL 52.0 09/23/2024     Lab Results   Component Value Date    LDLCALC 126 (H) 09/23/2024     Lab Results   Component Value Date    TRIG 213 (H) 09/23/2024     No components found for: \"CHOLHDL\"      ASSESSMENT/PLAN  AWV  Living Will: has a living will.   Cognition/Memory if 65 or above n/a  Hepatitis C screen (18-79) done 09/05/2020 [facility]  HIV screen(18-64, once) done 09/05/2020 [facility]  Mammogram 09/2024-future order  Pap 2020 Dr Keaton Chun 12/2024 normal.   Colon cancer screening 45 and older: scope 09/2021 due 2028  Immunization: recommend prevnar 20  CT calcium score: she would like to wait.     If Smoker/Former smoker:   Age 50-75, 20 pack year history, quit within 15 years or currently smoking  (medicare 55-77, 30 pack year) n/a    1. Vitamin D deficiency (Primary)  - Vitamin D 25-Hydroxy,Total (for eval of Vitamin D levels); Future    2. Hyperglycemia  - Comprehensive Metabolic Panel; Future  - CBC; Future  - Hemoglobin A1C; Future    3. Routine general medical examination at a health care facility    4. Encounter for screening mammogram for breast cancer  - BI mammo bilateral screening tomosynthesis; Future    5. Hyperlipidemia, unspecified hyperlipidemia type  - Lipid Panel; Future    Other-given contact information for lymphedema-this was diagnosed [facility].   Dr Christian JUSTICE recommended by Dr Vyas    Medications Ordered Prior to Encounter[2]         [1]   Patient Active Problem List  Diagnosis    Abdominal pain, epigastric    Abnormality, skin    Arthritis    Tendonitis    Allergic rhinitis    Ankle sprain    Asthma    Sleep apnea    Chronic constipation    Diarrhea    Change in bowel habits    Calcaneal spur of left foot    Calcaneal spur    Dyspepsia    Diverticulitis of colon    Chronic fatigue syndrome    Insomnia    Hypoglycemia    " Hyperlipidemia    Hyperglycemia    Hiatal hernia    GERD (gastroesophageal reflux disease)    Fibromyalgia    Muscle tension dysphonia    Vocal cord disease    Lesion of neck    Irritable larynx    Severe persistent asthma without complication (Multi)    Pain of left heel    Vasovagal symptom    Venous insufficiency (chronic) (peripheral)    Vitamin D deficiency    Vocal cord dysfunction    Atypical chest pain    Lower extremity edema    Feeling weak    Heel spur, right    Shortness of breath    Peripheral eosinophilia    Diverticulitis    Localized, primary osteoarthritis    Abnormal uterine bleeding unrelated to menstrual cycle    Ovarian retention cyst    Vasomotor symptoms due to menopause    Female pelvic pain   [2]   Current Outpatient Medications on File Prior to Visit   Medication Sig Dispense Refill    cetirizine (ZyrTEC) 10 mg tablet TAKE 1 TABLET BY MOUTH ONCE DAILY AS NEEDED FOR ALLERGIES 30 tablet 11    cyanocobalamin (Vitamin B-12) 1,000 mcg tablet Take 1 tablet (1,000 mcg) by mouth once daily.      diphenhydrAMINE (BENADryl) 25 mg capsule Take 1 capsule (25 mg) by mouth if needed for other (migraine) for up to 5 days. 5 capsule 0    fluticasone (Flonase) 50 mcg/actuation nasal spray Administer 1 spray into each nostril once daily. Shake gently. Before first use, prime pump. After use, clean tip and replace cap. 16 g 11    fluticasone propion-salmeteroL (Advair Diskus) 500-50 mcg/dose diskus inhaler Inhale 1 puff 2 times a day. Rinse mouth with water after use to reduce aftertaste and incidence of candidiasis. Do not swallow. 60 each 11    ipratropium-albuteroL (Duo-Neb) 0.5-2.5 mg/3 mL nebulizer solution Take 3 mL by nebulization 4 times a day as needed for wheezing. 720 mL 3    magnesium oxide 500 mg magnesium tablet Take 1 tablet (500 mg) by mouth once daily.      montelukast (Singulair) 10 mg tablet Take 1 tablet by mouth once daily 90 tablet 3    pantoprazole (ProtoNix) 40 mg EC tablet Take 1  tablet by mouth twice daily 60 tablet 3    Spiriva Respimat 2.5 mcg/actuation inhaler INHALE 2 SPRAY(S) BY MOUTH ONCE DAILY 4 g 6    venlafaxine XR (Effexor XR) 75 mg 24 hr capsule Take 1 capsule (75 mg) by mouth once daily. Do not crush or chew. 30 capsule 11    [DISCONTINUED] dupilumab (Dupixent) 300 mg/2 mL pen injector Inject 2 mL (300 mg) under the skin every 14 (fourteen) days. (Patient not taking: Reported on 4/28/2025) 12 mL 3    [DISCONTINUED] escitalopram (Lexapro) 5 mg tablet Take 1 tablet (5 mg) by mouth once daily. (Patient not taking: Reported on 4/28/2025) 90 tablet 1    [DISCONTINUED] venlafaxine XR (Effexor XR) 37.5 mg 24 hr capsule Take 1 capsule (37.5 mg) by mouth once daily. Do not crush or chew. (Patient not taking: Reported on 4/28/2025) 7 capsule 0     No current facility-administered medications on file prior to visit.

## 2025-04-28 ENCOUNTER — APPOINTMENT (OUTPATIENT)
Dept: PRIMARY CARE | Facility: CLINIC | Age: 50
End: 2025-04-28
Payer: COMMERCIAL

## 2025-04-28 VITALS
DIASTOLIC BLOOD PRESSURE: 72 MMHG | BODY MASS INDEX: 38.14 KG/M2 | WEIGHT: 202 LBS | HEIGHT: 61 IN | TEMPERATURE: 97.3 F | HEART RATE: 74 BPM | OXYGEN SATURATION: 96 % | SYSTOLIC BLOOD PRESSURE: 118 MMHG

## 2025-04-28 DIAGNOSIS — Z00.00 ROUTINE GENERAL MEDICAL EXAMINATION AT A HEALTH CARE FACILITY: ICD-10-CM

## 2025-04-28 DIAGNOSIS — R73.9 HYPERGLYCEMIA: ICD-10-CM

## 2025-04-28 DIAGNOSIS — E78.5 HYPERLIPIDEMIA, UNSPECIFIED HYPERLIPIDEMIA TYPE: ICD-10-CM

## 2025-04-28 DIAGNOSIS — Z12.31 ENCOUNTER FOR SCREENING MAMMOGRAM FOR BREAST CANCER: ICD-10-CM

## 2025-04-28 DIAGNOSIS — E55.9 VITAMIN D DEFICIENCY: Primary | ICD-10-CM

## 2025-04-28 PROCEDURE — 1036F TOBACCO NON-USER: CPT | Performed by: INTERNAL MEDICINE

## 2025-04-28 PROCEDURE — 3008F BODY MASS INDEX DOCD: CPT | Performed by: INTERNAL MEDICINE

## 2025-04-28 PROCEDURE — 99396 PREV VISIT EST AGE 40-64: CPT | Performed by: INTERNAL MEDICINE

## 2025-04-28 PROCEDURE — 99213 OFFICE O/P EST LOW 20 MIN: CPT | Performed by: INTERNAL MEDICINE

## 2025-04-28 ASSESSMENT — PATIENT HEALTH QUESTIONNAIRE - PHQ9
2. FEELING DOWN, DEPRESSED OR HOPELESS: NOT AT ALL
SUM OF ALL RESPONSES TO PHQ9 QUESTIONS 1 & 2: 0
1. LITTLE INTEREST OR PLEASURE IN DOING THINGS: NOT AT ALL

## 2025-04-28 ASSESSMENT — LIFESTYLE VARIABLES
AUDIT-C TOTAL SCORE: 1
HOW OFTEN DO YOU HAVE A DRINK CONTAINING ALCOHOL: MONTHLY OR LESS
HOW OFTEN DO YOU HAVE SIX OR MORE DRINKS ON ONE OCCASION: NEVER
SKIP TO QUESTIONS 9-10: 1
HOW MANY STANDARD DRINKS CONTAINING ALCOHOL DO YOU HAVE ON A TYPICAL DAY: 1 OR 2

## 2025-04-28 ASSESSMENT — PAIN SCALES - GENERAL: PAINLEVEL_OUTOF10: 0-NO PAIN

## 2025-05-29 DIAGNOSIS — F32.A DEPRESSION, UNSPECIFIED DEPRESSION TYPE: Primary | ICD-10-CM

## 2025-05-29 RX ORDER — BUPROPION HYDROCHLORIDE 150 MG/1
150 TABLET ORAL EVERY MORNING
Qty: 30 TABLET | Refills: 1 | Status: SHIPPED | OUTPATIENT
Start: 2025-05-29 | End: 2025-07-28

## 2025-06-04 ENCOUNTER — APPOINTMENT (OUTPATIENT)
Dept: CARDIOLOGY | Facility: HOSPITAL | Age: 50
End: 2025-06-04
Payer: COMMERCIAL

## 2025-06-04 ENCOUNTER — APPOINTMENT (OUTPATIENT)
Dept: RADIOLOGY | Facility: HOSPITAL | Age: 50
End: 2025-06-04
Payer: COMMERCIAL

## 2025-06-04 ENCOUNTER — HOSPITAL ENCOUNTER (OUTPATIENT)
Facility: HOSPITAL | Age: 50
Setting detail: OBSERVATION
Discharge: HOME | End: 2025-06-05
Attending: STUDENT IN AN ORGANIZED HEALTH CARE EDUCATION/TRAINING PROGRAM | Admitting: STUDENT IN AN ORGANIZED HEALTH CARE EDUCATION/TRAINING PROGRAM
Payer: COMMERCIAL

## 2025-06-04 DIAGNOSIS — N95.1 VASOMOTOR SYMPTOMS DUE TO MENOPAUSE: ICD-10-CM

## 2025-06-04 DIAGNOSIS — K21.9 GASTROESOPHAGEAL REFLUX DISEASE WITHOUT ESOPHAGITIS: ICD-10-CM

## 2025-06-04 DIAGNOSIS — R06.02 SOB (SHORTNESS OF BREATH): ICD-10-CM

## 2025-06-04 DIAGNOSIS — R06.02 SHORTNESS OF BREATH: Primary | ICD-10-CM

## 2025-06-04 DIAGNOSIS — R11.2 NAUSEA AND VOMITING, UNSPECIFIED VOMITING TYPE: ICD-10-CM

## 2025-06-04 LAB
ALBUMIN SERPL BCP-MCNC: 4.9 G/DL (ref 3.4–5)
ALP SERPL-CCNC: 60 U/L (ref 33–110)
ALT SERPL W P-5'-P-CCNC: 31 U/L (ref 7–45)
ANION GAP BLDV CALCULATED.4IONS-SCNC: 11 MMOL/L (ref 10–25)
ANION GAP SERPL CALC-SCNC: 20 MMOL/L (ref 10–20)
APAP SERPL-MCNC: <10 UG/ML (ref ?–30)
AST SERPL W P-5'-P-CCNC: 24 U/L (ref 9–39)
BASE EXCESS BLDV CALC-SCNC: 3.8 MMOL/L (ref -2–3)
BASOPHILS # BLD AUTO: 0.04 X10*3/UL (ref 0–0.1)
BASOPHILS NFR BLD AUTO: 0.4 %
BILIRUB SERPL-MCNC: 0.5 MG/DL (ref 0–1.2)
BODY TEMPERATURE: ABNORMAL
BUN SERPL-MCNC: 22 MG/DL (ref 6–23)
CA-I BLDV-SCNC: 1.16 MMOL/L (ref 1.1–1.33)
CALCIUM SERPL-MCNC: 10 MG/DL (ref 8.6–10.3)
CARDIAC TROPONIN I PNL SERPL HS: <3 NG/L (ref 0–13)
CARDIAC TROPONIN I PNL SERPL HS: <3 NG/L (ref 0–13)
CHLORIDE BLDV-SCNC: 103 MMOL/L (ref 98–107)
CHLORIDE SERPL-SCNC: 100 MMOL/L (ref 98–107)
CO2 SERPL-SCNC: 20 MMOL/L (ref 21–32)
CREAT SERPL-MCNC: 0.75 MG/DL (ref 0.5–1.05)
CRITICAL CALL TIME: 1159
CRITICAL CALLED BY: ABNORMAL
CRITICAL CALLED TO: ABNORMAL
CRITICAL READ BACK: ABNORMAL
EGFRCR SERPLBLD CKD-EPI 2021: >90 ML/MIN/1.73M*2
EOSINOPHIL # BLD AUTO: 0.14 X10*3/UL (ref 0–0.7)
EOSINOPHIL NFR BLD AUTO: 1.4 %
ERYTHROCYTE [DISTWIDTH] IN BLOOD BY AUTOMATED COUNT: 13.4 % (ref 11.5–14.5)
ETHANOL SERPL-MCNC: <10 MG/DL
FLUAV RNA RESP QL NAA+PROBE: NOT DETECTED
FLUBV RNA RESP QL NAA+PROBE: NOT DETECTED
GLUCOSE BLD MANUAL STRIP-MCNC: 135 MG/DL (ref 74–99)
GLUCOSE BLDV-MCNC: 190 MG/DL (ref 74–99)
GLUCOSE SERPL-MCNC: 177 MG/DL (ref 74–99)
HCO3 BLDV-SCNC: 23.1 MMOL/L (ref 22–26)
HCT VFR BLD AUTO: 40.6 % (ref 36–46)
HCT VFR BLD EST: 43 % (ref 36–46)
HGB BLD-MCNC: 14 G/DL (ref 12–16)
HGB BLDV-MCNC: 14.2 G/DL (ref 12–16)
IMM GRANULOCYTES # BLD AUTO: 0.02 X10*3/UL (ref 0–0.7)
IMM GRANULOCYTES NFR BLD AUTO: 0.2 % (ref 0–0.9)
INHALED O2 CONCENTRATION: 21 %
LACTATE BLDV-SCNC: 3.8 MMOL/L (ref 0.4–2)
LACTATE SERPL-SCNC: 3.6 MMOL/L (ref 0.4–2)
LACTATE SERPL-SCNC: 4.2 MMOL/L (ref 0.4–2)
LACTATE SERPL-SCNC: 4.7 MMOL/L (ref 0.4–2)
LYMPHOCYTES # BLD AUTO: 3.96 X10*3/UL (ref 1.2–4.8)
LYMPHOCYTES NFR BLD AUTO: 39.4 %
MAGNESIUM SERPL-MCNC: 1.79 MG/DL (ref 1.6–2.4)
MCH RBC QN AUTO: 30.4 PG (ref 26–34)
MCHC RBC AUTO-ENTMCNC: 34.5 G/DL (ref 32–36)
MCV RBC AUTO: 88 FL (ref 80–100)
MONOCYTES # BLD AUTO: 0.51 X10*3/UL (ref 0.1–1)
MONOCYTES NFR BLD AUTO: 5.1 %
NEUTROPHILS # BLD AUTO: 5.37 X10*3/UL (ref 1.2–7.7)
NEUTROPHILS NFR BLD AUTO: 53.5 %
NRBC BLD-RTO: 0 /100 WBCS (ref 0–0)
OXYHGB MFR BLDV: 72.7 % (ref 45–75)
PCO2 BLDV: 22 MM HG (ref 41–51)
PH BLDV: 7.63 PH (ref 7.33–7.43)
PHOSPHATE SERPL-MCNC: 1.3 MG/DL (ref 2.5–4.9)
PLATELET # BLD AUTO: 293 X10*3/UL (ref 150–450)
PO2 BLDV: 38 MM HG (ref 35–45)
POTASSIUM BLDV-SCNC: 3.5 MMOL/L (ref 3.5–5.3)
POTASSIUM SERPL-SCNC: 3.5 MMOL/L (ref 3.5–5.3)
PROT SERPL-MCNC: 7.3 G/DL (ref 6.4–8.2)
RBC # BLD AUTO: 4.61 X10*6/UL (ref 4–5.2)
SALICYLATES SERPL-MCNC: <3 MG/DL (ref ?–20)
SAO2 % BLDV: 74 % (ref 45–75)
SARS-COV-2 RNA RESP QL NAA+PROBE: NOT DETECTED
SODIUM BLDV-SCNC: 134 MMOL/L (ref 136–145)
SODIUM SERPL-SCNC: 136 MMOL/L (ref 136–145)
T4 FREE SERPL-MCNC: 0.81 NG/DL (ref 0.61–1.12)
TSH SERPL-ACNC: 0.88 MIU/L (ref 0.44–3.98)
WBC # BLD AUTO: 10 X10*3/UL (ref 4.4–11.3)

## 2025-06-04 PROCEDURE — 82947 ASSAY GLUCOSE BLOOD QUANT: CPT

## 2025-06-04 PROCEDURE — 2500000005 HC RX 250 GENERAL PHARMACY W/O HCPCS

## 2025-06-04 PROCEDURE — 84132 ASSAY OF SERUM POTASSIUM: CPT

## 2025-06-04 PROCEDURE — 2500000004 HC RX 250 GENERAL PHARMACY W/ HCPCS (ALT 636 FOR OP/ED)

## 2025-06-04 PROCEDURE — 94640 AIRWAY INHALATION TREATMENT: CPT

## 2025-06-04 PROCEDURE — 2500000002 HC RX 250 W HCPCS SELF ADMINISTERED DRUGS (ALT 637 FOR MEDICARE OP, ALT 636 FOR OP/ED)

## 2025-06-04 PROCEDURE — 36415 COLL VENOUS BLD VENIPUNCTURE: CPT | Performed by: STUDENT IN AN ORGANIZED HEALTH CARE EDUCATION/TRAINING PROGRAM

## 2025-06-04 PROCEDURE — 84132 ASSAY OF SERUM POTASSIUM: CPT | Performed by: STUDENT IN AN ORGANIZED HEALTH CARE EDUCATION/TRAINING PROGRAM

## 2025-06-04 PROCEDURE — 84100 ASSAY OF PHOSPHORUS: CPT | Performed by: STUDENT IN AN ORGANIZED HEALTH CARE EDUCATION/TRAINING PROGRAM

## 2025-06-04 PROCEDURE — 96376 TX/PRO/DX INJ SAME DRUG ADON: CPT | Mod: 59

## 2025-06-04 PROCEDURE — 80320 DRUG SCREEN QUANTALCOHOLS: CPT

## 2025-06-04 PROCEDURE — G0378 HOSPITAL OBSERVATION PER HR: HCPCS

## 2025-06-04 PROCEDURE — 84484 ASSAY OF TROPONIN QUANT: CPT

## 2025-06-04 PROCEDURE — 83605 ASSAY OF LACTIC ACID: CPT

## 2025-06-04 PROCEDURE — 83880 ASSAY OF NATRIURETIC PEPTIDE: CPT | Performed by: SPECIALIST

## 2025-06-04 PROCEDURE — 83605 ASSAY OF LACTIC ACID: CPT | Performed by: STUDENT IN AN ORGANIZED HEALTH CARE EDUCATION/TRAINING PROGRAM

## 2025-06-04 PROCEDURE — 83036 HEMOGLOBIN GLYCOSYLATED A1C: CPT | Mod: STJLAB | Performed by: STUDENT IN AN ORGANIZED HEALTH CARE EDUCATION/TRAINING PROGRAM

## 2025-06-04 PROCEDURE — 85025 COMPLETE CBC W/AUTO DIFF WBC: CPT

## 2025-06-04 PROCEDURE — 93005 ELECTROCARDIOGRAM TRACING: CPT

## 2025-06-04 PROCEDURE — 87636 SARSCOV2 & INF A&B AMP PRB: CPT

## 2025-06-04 PROCEDURE — 71275 CT ANGIOGRAPHY CHEST: CPT

## 2025-06-04 PROCEDURE — 71275 CT ANGIOGRAPHY CHEST: CPT | Performed by: RADIOLOGY

## 2025-06-04 PROCEDURE — 2550000001 HC RX 255 CONTRASTS: Performed by: STUDENT IN AN ORGANIZED HEALTH CARE EDUCATION/TRAINING PROGRAM

## 2025-06-04 PROCEDURE — 80143 DRUG ASSAY ACETAMINOPHEN: CPT

## 2025-06-04 PROCEDURE — 71045 X-RAY EXAM CHEST 1 VIEW: CPT

## 2025-06-04 PROCEDURE — 96361 HYDRATE IV INFUSION ADD-ON: CPT

## 2025-06-04 PROCEDURE — 2500000001 HC RX 250 WO HCPCS SELF ADMINISTERED DRUGS (ALT 637 FOR MEDICARE OP)

## 2025-06-04 PROCEDURE — 84443 ASSAY THYROID STIM HORMONE: CPT

## 2025-06-04 PROCEDURE — 96366 THER/PROPH/DIAG IV INF ADDON: CPT

## 2025-06-04 PROCEDURE — 83735 ASSAY OF MAGNESIUM: CPT | Performed by: STUDENT IN AN ORGANIZED HEALTH CARE EDUCATION/TRAINING PROGRAM

## 2025-06-04 PROCEDURE — 96367 TX/PROPH/DG ADDL SEQ IV INF: CPT

## 2025-06-04 PROCEDURE — 84439 ASSAY OF FREE THYROXINE: CPT

## 2025-06-04 PROCEDURE — 99285 EMERGENCY DEPT VISIT HI MDM: CPT | Mod: 25 | Performed by: STUDENT IN AN ORGANIZED HEALTH CARE EDUCATION/TRAINING PROGRAM

## 2025-06-04 PROCEDURE — 82435 ASSAY OF BLOOD CHLORIDE: CPT | Performed by: STUDENT IN AN ORGANIZED HEALTH CARE EDUCATION/TRAINING PROGRAM

## 2025-06-04 PROCEDURE — 96375 TX/PRO/DX INJ NEW DRUG ADDON: CPT | Mod: 59

## 2025-06-04 PROCEDURE — 71045 X-RAY EXAM CHEST 1 VIEW: CPT | Mod: FOREIGN READ | Performed by: STUDENT IN AN ORGANIZED HEALTH CARE EDUCATION/TRAINING PROGRAM

## 2025-06-04 PROCEDURE — 96365 THER/PROPH/DIAG IV INF INIT: CPT | Mod: 59

## 2025-06-04 PROCEDURE — 80061 LIPID PANEL: CPT | Performed by: STUDENT IN AN ORGANIZED HEALTH CARE EDUCATION/TRAINING PROGRAM

## 2025-06-04 RX ORDER — IPRATROPIUM BROMIDE AND ALBUTEROL SULFATE 2.5; .5 MG/3ML; MG/3ML
3 SOLUTION RESPIRATORY (INHALATION) EVERY 20 MIN
Status: COMPLETED | OUTPATIENT
Start: 2025-06-04 | End: 2025-06-04

## 2025-06-04 RX ORDER — BUDESONIDE 0.5 MG/2ML
0.5 INHALANT ORAL
Status: DISCONTINUED | OUTPATIENT
Start: 2025-06-04 | End: 2025-06-05 | Stop reason: HOSPADM

## 2025-06-04 RX ORDER — IPRATROPIUM BROMIDE 0.5 MG/2.5ML
0.5 SOLUTION RESPIRATORY (INHALATION)
Status: DISCONTINUED | OUTPATIENT
Start: 2025-06-04 | End: 2025-06-04

## 2025-06-04 RX ORDER — LANOLIN ALCOHOL/MO/W.PET/CERES
400 CREAM (GRAM) TOPICAL DAILY
Status: DISCONTINUED | OUTPATIENT
Start: 2025-06-05 | End: 2025-06-05 | Stop reason: HOSPADM

## 2025-06-04 RX ORDER — ONDANSETRON HYDROCHLORIDE 2 MG/ML
4 INJECTION, SOLUTION INTRAVENOUS ONCE
Status: COMPLETED | OUTPATIENT
Start: 2025-06-04 | End: 2025-06-04

## 2025-06-04 RX ORDER — LANOLIN ALCOHOL/MO/W.PET/CERES
1000 CREAM (GRAM) TOPICAL DAILY
Status: DISCONTINUED | OUTPATIENT
Start: 2025-06-05 | End: 2025-06-05 | Stop reason: HOSPADM

## 2025-06-04 RX ORDER — CETIRIZINE HYDROCHLORIDE 10 MG/1
10 TABLET ORAL DAILY PRN
Status: DISCONTINUED | OUTPATIENT
Start: 2025-06-04 | End: 2025-06-05 | Stop reason: HOSPADM

## 2025-06-04 RX ORDER — POLYETHYLENE GLYCOL 3350 17 G/17G
17 POWDER, FOR SOLUTION ORAL DAILY
Status: DISCONTINUED | OUTPATIENT
Start: 2025-06-04 | End: 2025-06-05 | Stop reason: HOSPADM

## 2025-06-04 RX ORDER — VENLAFAXINE HYDROCHLORIDE 75 MG/1
75 CAPSULE, EXTENDED RELEASE ORAL DAILY
Status: DISCONTINUED | OUTPATIENT
Start: 2025-06-05 | End: 2025-06-05 | Stop reason: HOSPADM

## 2025-06-04 RX ORDER — FLUTICASONE PROPIONATE 50 MCG
1 SPRAY, SUSPENSION (ML) NASAL DAILY
Status: DISCONTINUED | OUTPATIENT
Start: 2025-06-05 | End: 2025-06-05 | Stop reason: HOSPADM

## 2025-06-04 RX ORDER — FORMOTEROL FUMARATE 20 UG/2ML
20 SOLUTION RESPIRATORY (INHALATION)
Status: DISCONTINUED | OUTPATIENT
Start: 2025-06-04 | End: 2025-06-05 | Stop reason: HOSPADM

## 2025-06-04 RX ORDER — PANTOPRAZOLE SODIUM 40 MG/1
80 TABLET, DELAYED RELEASE ORAL
Status: DISCONTINUED | OUTPATIENT
Start: 2025-06-05 | End: 2025-06-05 | Stop reason: HOSPADM

## 2025-06-04 RX ORDER — IBUPROFEN 600 MG/1
600 TABLET, FILM COATED ORAL ONCE
Status: COMPLETED | OUTPATIENT
Start: 2025-06-04 | End: 2025-06-04

## 2025-06-04 RX ORDER — ENOXAPARIN SODIUM 100 MG/ML
40 INJECTION SUBCUTANEOUS EVERY 24 HOURS
Status: DISCONTINUED | OUTPATIENT
Start: 2025-06-04 | End: 2025-06-05 | Stop reason: HOSPADM

## 2025-06-04 RX ORDER — IPRATROPIUM BROMIDE AND ALBUTEROL SULFATE 2.5; .5 MG/3ML; MG/3ML
3 SOLUTION RESPIRATORY (INHALATION) 4 TIMES DAILY PRN
Status: DISCONTINUED | OUTPATIENT
Start: 2025-06-04 | End: 2025-06-04

## 2025-06-04 RX ORDER — MONTELUKAST SODIUM 10 MG/1
10 TABLET ORAL DAILY
Status: DISCONTINUED | OUTPATIENT
Start: 2025-06-05 | End: 2025-06-05 | Stop reason: HOSPADM

## 2025-06-04 RX ORDER — IPRATROPIUM BROMIDE AND ALBUTEROL SULFATE 2.5; .5 MG/3ML; MG/3ML
3 SOLUTION RESPIRATORY (INHALATION) 3 TIMES DAILY
Status: DISCONTINUED | OUTPATIENT
Start: 2025-06-04 | End: 2025-06-05 | Stop reason: HOSPADM

## 2025-06-04 RX ORDER — MAGNESIUM SULFATE HEPTAHYDRATE 40 MG/ML
2 INJECTION, SOLUTION INTRAVENOUS ONCE
Status: COMPLETED | OUTPATIENT
Start: 2025-06-04 | End: 2025-06-04

## 2025-06-04 RX ADMIN — IOHEXOL 60 ML: 350 INJECTION, SOLUTION INTRAVENOUS at 14:35

## 2025-06-04 RX ADMIN — POTASSIUM PHOSPHATE, MONOBASIC AND POTASSIUM PHOSPHATE, DIBASIC 21 MMOL: 224; 236 INJECTION, SOLUTION, CONCENTRATE INTRAVENOUS at 15:45

## 2025-06-04 RX ADMIN — SODIUM CHLORIDE, POTASSIUM CHLORIDE, SODIUM LACTATE AND CALCIUM CHLORIDE 500 ML: 600; 310; 30; 20 INJECTION, SOLUTION INTRAVENOUS at 17:59

## 2025-06-04 RX ADMIN — IBUPROFEN 600 MG: 600 TABLET, FILM COATED ORAL at 20:26

## 2025-06-04 RX ADMIN — IPRATROPIUM BROMIDE AND ALBUTEROL SULFATE 3 ML: 2.5; .5 SOLUTION RESPIRATORY (INHALATION) at 11:32

## 2025-06-04 RX ADMIN — IPRATROPIUM BROMIDE AND ALBUTEROL SULFATE 3 ML: 2.5; .5 SOLUTION RESPIRATORY (INHALATION) at 11:34

## 2025-06-04 RX ADMIN — ONDANSETRON 4 MG: 2 INJECTION INTRAMUSCULAR; INTRAVENOUS at 12:02

## 2025-06-04 RX ADMIN — METHYLPREDNISOLONE SODIUM SUCCINATE 125 MG: 125 INJECTION, POWDER, FOR SOLUTION INTRAMUSCULAR; INTRAVENOUS at 12:03

## 2025-06-04 RX ADMIN — ONDANSETRON 4 MG: 2 INJECTION INTRAMUSCULAR; INTRAVENOUS at 21:51

## 2025-06-04 RX ADMIN — IPRATROPIUM BROMIDE AND ALBUTEROL SULFATE 3 ML: 2.5; .5 SOLUTION RESPIRATORY (INHALATION) at 11:36

## 2025-06-04 RX ADMIN — MAGNESIUM SULFATE HEPTAHYDRATE 2 G: 40 INJECTION, SOLUTION INTRAVENOUS at 12:03

## 2025-06-04 RX ADMIN — SODIUM CHLORIDE, POTASSIUM CHLORIDE, SODIUM LACTATE AND CALCIUM CHLORIDE 1000 ML: 600; 310; 30; 20 INJECTION, SOLUTION INTRAVENOUS at 12:03

## 2025-06-04 SDOH — SOCIAL STABILITY: SOCIAL INSECURITY: ARE THERE ANY APPARENT SIGNS OF INJURIES/BEHAVIORS THAT COULD BE RELATED TO ABUSE/NEGLECT?: NO

## 2025-06-04 SDOH — ECONOMIC STABILITY: HOUSING INSECURITY: IN THE LAST 12 MONTHS, WAS THERE A TIME WHEN YOU WERE NOT ABLE TO PAY THE MORTGAGE OR RENT ON TIME?: NO

## 2025-06-04 SDOH — SOCIAL STABILITY: SOCIAL INSECURITY: DO YOU FEEL ANYONE HAS EXPLOITED OR TAKEN ADVANTAGE OF YOU FINANCIALLY OR OF YOUR PERSONAL PROPERTY?: NO

## 2025-06-04 SDOH — ECONOMIC STABILITY: HOUSING INSECURITY: IN THE PAST 12 MONTHS, HOW MANY TIMES HAVE YOU MOVED WHERE YOU WERE LIVING?: 0

## 2025-06-04 SDOH — SOCIAL STABILITY: SOCIAL INSECURITY
WITHIN THE LAST YEAR, HAVE YOU BEEN HUMILIATED OR EMOTIONALLY ABUSED IN OTHER WAYS BY YOUR PARTNER OR EX-PARTNER?: PATIENT DECLINED

## 2025-06-04 SDOH — ECONOMIC STABILITY: HOUSING INSECURITY: AT ANY TIME IN THE PAST 12 MONTHS, WERE YOU HOMELESS OR LIVING IN A SHELTER (INCLUDING NOW)?: NO

## 2025-06-04 SDOH — SOCIAL STABILITY: SOCIAL INSECURITY: WITHIN THE LAST YEAR, HAVE YOU BEEN AFRAID OF YOUR PARTNER OR EX-PARTNER?: PATIENT DECLINED

## 2025-06-04 SDOH — ECONOMIC STABILITY: FOOD INSECURITY: WITHIN THE PAST 12 MONTHS, THE FOOD YOU BOUGHT JUST DIDN'T LAST AND YOU DIDN'T HAVE MONEY TO GET MORE.: PATIENT DECLINED

## 2025-06-04 SDOH — ECONOMIC STABILITY: HOUSING INSECURITY: AT ANY TIME IN THE PAST 12 MONTHS, WERE YOU HOMELESS OR LIVING IN A SHELTER (INCLUDING NOW)?: PATIENT DECLINED

## 2025-06-04 SDOH — SOCIAL STABILITY: SOCIAL INSECURITY
WITHIN THE LAST YEAR, HAVE YOU BEEN KICKED, HIT, SLAPPED, OR OTHERWISE PHYSICALLY HURT BY YOUR PARTNER OR EX-PARTNER?: PATIENT DECLINED

## 2025-06-04 SDOH — ECONOMIC STABILITY: HOUSING INSECURITY: IN THE LAST 12 MONTHS, WAS THERE A TIME WHEN YOU WERE NOT ABLE TO PAY THE MORTGAGE OR RENT ON TIME?: PATIENT DECLINED

## 2025-06-04 SDOH — ECONOMIC STABILITY: FOOD INSECURITY
WITHIN THE PAST 12 MONTHS, YOU WORRIED THAT YOUR FOOD WOULD RUN OUT BEFORE YOU GOT THE MONEY TO BUY MORE.: PATIENT DECLINED

## 2025-06-04 SDOH — SOCIAL STABILITY: SOCIAL INSECURITY
WITHIN THE LAST YEAR, HAVE YOU BEEN RAPED OR FORCED TO HAVE ANY KIND OF SEXUAL ACTIVITY BY YOUR PARTNER OR EX-PARTNER?: PATIENT DECLINED

## 2025-06-04 SDOH — ECONOMIC STABILITY: INCOME INSECURITY
IN THE PAST 12 MONTHS HAS THE ELECTRIC, GAS, OIL, OR WATER COMPANY THREATENED TO SHUT OFF SERVICES IN YOUR HOME?: PATIENT DECLINED

## 2025-06-04 SDOH — SOCIAL STABILITY: SOCIAL INSECURITY: ARE YOU OR HAVE YOU BEEN THREATENED OR ABUSED PHYSICALLY, EMOTIONALLY, OR SEXUALLY BY ANYONE?: NO

## 2025-06-04 SDOH — ECONOMIC STABILITY: TRANSPORTATION INSECURITY
IN THE PAST 12 MONTHS, HAS LACK OF TRANSPORTATION KEPT YOU FROM MEDICAL APPOINTMENTS OR FROM GETTING MEDICATIONS?: PATIENT DECLINED

## 2025-06-04 SDOH — SOCIAL STABILITY: SOCIAL INSECURITY: HAS ANYONE EVER THREATENED TO HURT YOUR FAMILY OR YOUR PETS?: NO

## 2025-06-04 SDOH — SOCIAL STABILITY: SOCIAL INSECURITY: DOES ANYONE TRY TO KEEP YOU FROM HAVING/CONTACTING OTHER FRIENDS OR DOING THINGS OUTSIDE YOUR HOME?: NO

## 2025-06-04 SDOH — SOCIAL STABILITY: SOCIAL INSECURITY: WERE YOU ABLE TO COMPLETE ALL THE BEHAVIORAL HEALTH SCREENINGS?: YES

## 2025-06-04 SDOH — ECONOMIC STABILITY: TRANSPORTATION INSECURITY: IN THE PAST 12 MONTHS, HAS LACK OF TRANSPORTATION KEPT YOU FROM MEDICAL APPOINTMENTS OR FROM GETTING MEDICATIONS?: NO

## 2025-06-04 SDOH — SOCIAL STABILITY: SOCIAL INSECURITY: DO YOU FEEL UNSAFE GOING BACK TO THE PLACE WHERE YOU ARE LIVING?: NO

## 2025-06-04 SDOH — SOCIAL STABILITY: SOCIAL INSECURITY: ABUSE: ADULT

## 2025-06-04 SDOH — SOCIAL STABILITY: SOCIAL INSECURITY: HAVE YOU HAD THOUGHTS OF HARMING ANYONE ELSE?: NO

## 2025-06-04 ASSESSMENT — COGNITIVE AND FUNCTIONAL STATUS - GENERAL
PATIENT BASELINE BEDBOUND: NO
MOBILITY SCORE: 24
MOBILITY SCORE: 23
DAILY ACTIVITIY SCORE: 24
DAILY ACTIVITIY SCORE: 24
CLIMB 3 TO 5 STEPS WITH RAILING: A LITTLE

## 2025-06-04 ASSESSMENT — ENCOUNTER SYMPTOMS
HEADACHES: 0
CHEST TIGHTNESS: 0
HEMATURIA: 0
SINUS PAIN: 0
RHINORRHEA: 0
LIGHT-HEADEDNESS: 1
VOMITING: 1
COUGH: 0
NUMBNESS: 0
DIAPHORESIS: 0
NAUSEA: 1
FATIGUE: 1
ABDOMINAL PAIN: 0
ANAL BLEEDING: 0
FACIAL ASYMMETRY: 0
SEIZURES: 0
DIZZINESS: 1
APPETITE CHANGE: 0
FLANK PAIN: 0
CHILLS: 0
DYSURIA: 0
FEVER: 0
DIFFICULTY URINATING: 0
SINUS PRESSURE: 0
WEAKNESS: 1
CONFUSION: 0
SPEECH DIFFICULTY: 0
BLOOD IN STOOL: 0
ABDOMINAL DISTENTION: 0
WHEEZING: 0

## 2025-06-04 ASSESSMENT — ACTIVITIES OF DAILY LIVING (ADL)
HEARING - LEFT EAR: FUNCTIONAL
WALKS IN HOME: INDEPENDENT
HEARING - RIGHT EAR: FUNCTIONAL
FEEDING YOURSELF: INDEPENDENT
ADEQUATE_TO_COMPLETE_ADL: YES
LACK_OF_TRANSPORTATION: NO
PATIENT'S MEMORY ADEQUATE TO SAFELY COMPLETE DAILY ACTIVITIES?: YES
BATHING: INDEPENDENT
GROOMING: INDEPENDENT
DRESSING YOURSELF: INDEPENDENT
TOILETING: INDEPENDENT
LACK_OF_TRANSPORTATION: PATIENT DECLINED
LACK_OF_TRANSPORTATION: PATIENT DECLINED
JUDGMENT_ADEQUATE_SAFELY_COMPLETE_DAILY_ACTIVITIES: YES

## 2025-06-04 ASSESSMENT — PAIN SCALES - GENERAL
PAINLEVEL_OUTOF10: 0 - NO PAIN
PAINLEVEL_OUTOF10: 6

## 2025-06-04 ASSESSMENT — LIFESTYLE VARIABLES
SKIP TO QUESTIONS 9-10: 1
AUDIT-C TOTAL SCORE: 2
AUDIT-C TOTAL SCORE: 2
HOW OFTEN DO YOU HAVE 6 OR MORE DRINKS ON ONE OCCASION: NEVER
HOW OFTEN DO YOU HAVE A DRINK CONTAINING ALCOHOL: 2-4 TIMES A MONTH
HOW MANY STANDARD DRINKS CONTAINING ALCOHOL DO YOU HAVE ON A TYPICAL DAY: 1 OR 2

## 2025-06-04 ASSESSMENT — PAIN - FUNCTIONAL ASSESSMENT
PAIN_FUNCTIONAL_ASSESSMENT: 0-10
PAIN_FUNCTIONAL_ASSESSMENT: 0-10

## 2025-06-04 ASSESSMENT — PAIN DESCRIPTION - LOCATION: LOCATION: HEAD

## 2025-06-04 NOTE — H&P
Internal Medicine    Admission H&P      Patient Rema Silver PCP Claire Duron MD    MRN [id number]  Admission Date 6/4/2025      Chief Complaint/Reason for Admission:  Rema is a 49 y.o. female who presented today with near syncope ACS rule out    Subjective    Subjective   History of Presenting Illness  Ms. Rema Silver is a 49 y.o. female with a primary complaint of diaphoresis, lightheadedness, nausea, vomiting. PMHx severe persistent asthma on tezspire, provoked right knee DVT/PE (2020) not on anticoagulation, mild vocal cord dysfunction, GERD, sleep apnea, chronic lymphedema, vasomotor symptoms due to menopause, migraines, and depression.  Patient states she was at work this morning began to feel diaphoretic and lightheaded while speaking with a client.  Patient felt like she was going to faint has to take leave from work and attempted to drive herself to the emergency department.  Patient felt like she was unable to make it pulled over called family and had her family take her to the emergency department.  During this the patient denied wheezing, chest pain, jaw pain, headache, posterior cervical pain.  Patient states they are eating and drinking fine yesterday/this morning no changes in diet, no sick contacts, no recent travel, no recent addition of pets.  Patient denies being out for an extended period of time this morning exercising.  Patient denies drinking any alcohol yesterday.  Speaking to the patient in the emergency department the patient feels they do not have any wheezing, no recent increase in shortness of breath, sputum production, cough.  Patient states that they have been using their inhalers as prescribed daily and follows with pulmonology consistently.  Patient denied any family history of thyroid disease, pancreatic disease, cancers, heart disease.    ED course:  V/S: 36.3 °C, , RR 20, /78, SpO2 99% on room air  Labs: CBC, CMP, troponin x 2, RVP, VBG, glucose, mag,  Phos, lactate, TSH with reflex  EKG: Sinus tachycardia, QTc 480 ms Bazett, 433 ms Rosalva  Imaging: CXR, CT angio chest for PE  Intervention: DuoNebs, methylprednisolone, IV magnesium, IV ondansetron, 1 L LR, IV potassium phosphate, cardiology consult, admission to hospital for observation    Past Medical History  Active Ambulatory Problems     Diagnosis Date Noted    Abdominal pain, epigastric 10/16/2023    Abnormality, skin 10/16/2023    Arthritis 10/16/2023    Tendonitis 10/16/2023    Allergic rhinitis 10/16/2023    Ankle sprain 10/16/2023    Asthma 10/16/2023    Sleep apnea 10/16/2023    Chronic constipation 10/16/2023    Diarrhea 10/16/2023    Change in bowel habits 10/16/2023    Calcaneal spur of left foot 10/16/2023    Calcaneal spur 10/16/2023    Dyspepsia 10/16/2023    Diverticulitis of colon 10/16/2023    Chronic fatigue syndrome 10/16/2023    Insomnia 10/16/2023    Hypoglycemia 10/16/2023    Hyperlipidemia 10/16/2023    Hyperglycemia 10/16/2023    Hiatal hernia 10/16/2023    GERD (gastroesophageal reflux disease) 10/16/2023    Fibromyalgia 10/16/2023    Muscle tension dysphonia 10/16/2023    Vocal cord disease 10/16/2023    Lesion of neck 10/16/2023    Irritable larynx 10/16/2023    Severe persistent asthma without complication (Multi) 10/16/2023    Pain of left heel 10/16/2023    Vasovagal symptom 10/16/2023    Venous insufficiency (chronic) (peripheral) 10/16/2023    Vitamin D deficiency 10/16/2023    Vocal cord dysfunction 10/16/2023    Atypical chest pain 10/16/2023    Lower extremity edema 10/16/2023    Feeling weak 10/16/2023    Heel spur, right 10/16/2023    Shortness of breath 12/06/2023    Peripheral eosinophilia 12/06/2023    Diverticulitis 09/07/2024    Localized, primary osteoarthritis 04/10/2020    Abnormal uterine bleeding unrelated to menstrual cycle 11/05/2024    Ovarian retention cyst 11/05/2024    Vasomotor symptoms due to menopause 03/19/2025    Female pelvic pain 03/19/2025      Resolved Ambulatory Problems     Diagnosis Date Noted    Bacterial vaginitis 10/16/2023    Gastroesophageal reflux disease with esophagitis 10/16/2023    Infection, upper respiratory 10/16/2023    Overweight 10/16/2023    Otitis media of left ear 10/16/2023    Sigmoid diverticulosis 10/16/2023    BMI 37.0-37.9, adult 10/16/2023    Severe persistent asthma with exacerbation (Multi) 12/06/2023    Acute recurrent frontal sinusitis 12/18/2023    Syncope and collapse 07/23/2024    Acute non-ST elevation myocardial infarction (NSTEMI) (Multi) 06/04/2018    Contact with and (suspected) exposure to covid-19 08/13/2023    Cough, unspecified 08/24/2022    Derangement of posterior horn of medial meniscus 04/10/2020    Disease due to severe acute respiratory syndrome coronavirus 2 (SARS-CoV-2) 09/07/2024    Edema 09/07/2024    Edema of both lower extremities 09/07/2024    Essential (primary) hypertension 09/10/2022    Personal history of COVID-19 12/21/2022    Plantar fasciitis, left 03/06/2018    Sprain of left foot 04/10/2018    Sprain of medial collateral ligament of knee 04/01/2020     Past Medical History:   Diagnosis Date    Migraines     Personal history of other diseases of the respiratory system     PONV (postoperative nausea and vomiting)        Past Surgical History   Surgical History[1]    Social History  Social History[2]    Home Medication:  Prior to Admission medications    Medication Sig Start Date End Date Taking? Authorizing Provider   buPROPion XL (Wellbutrin XL) 150 mg 24 hr tablet Take 1 tablet (150 mg) by mouth once daily in the morning. Do not crush, chew, or split. 5/29/25 7/28/25  Claire Nicole MD   cetirizine (ZyrTEC) 10 mg tablet TAKE 1 TABLET BY MOUTH ONCE DAILY AS NEEDED FOR ALLERGIES 11/1/24  Yes Mary Hanna MD   cyanocobalamin (Vitamin B-12) 1,000 mcg tablet Take 1 tablet (1,000 mcg) by mouth once daily.   Yes Historical Provider, MD   diphenhydrAMINE (BENADryl) 25 mg capsule Take  1 capsule (25 mg) by mouth if needed for other (migraine) for up to 5 days. 7/24/24  Yes Yaa Martinez,    fluticasone (Flonase) 50 mcg/actuation nasal spray Administer 1 spray into each nostril once daily. Shake gently. Before first use, prime pump. After use, clean tip and replace cap. 1/27/25  Yes Mary Hanna MD   fluticasone propion-salmeteroL (Advair Diskus) 500-50 mcg/dose diskus inhaler Inhale 1 puff 2 times a day. Rinse mouth with water after use to reduce aftertaste and incidence of candidiasis. Do not swallow. 2/21/25 2/21/26 Yes Mary Hanna MD   ipratropium-albuteroL (Duo-Neb) 0.5-2.5 mg/3 mL nebulizer solution Take 3 mL by nebulization 4 times a day as needed for wheezing. 9/18/24  Yes Mary Hanna MD   magnesium oxide 500 mg magnesium tablet Take 1 tablet (500 mg) by mouth once daily.   Yes Historical Provider, MD   montelukast (Singulair) 10 mg tablet Take 1 tablet by mouth once daily 3/10/25  Yes Mary Hanna MD   pantoprazole (ProtoNix) 40 mg EC tablet Take 1 tablet by mouth twice daily  Patient taking differently: Take 2 tablets (80 mg) by mouth once daily in the morning. Take before meals. 4/7/25  Yes Mary Hanna MD   Spiriva Respimat 2.5 mcg/actuation inhaler INHALE 2 SPRAY(S) BY MOUTH ONCE DAILY  Patient taking differently: Inhale 2 puffs once daily. 1/21/25  Yes Mary Hanna MD   venlafaxine XR (Effexor XR) 75 mg 24 hr capsule Take 1 capsule (75 mg) by mouth once daily. Do not crush or chew. 3/19/25 3/19/26 Yes Bita Pugh MD        Family History  Noncontributory to current complaint.    Allergies:  RX Allergies[3]     Review of System:  Review of Systems   Constitutional:  Positive for fatigue. Negative for appetite change, chills, diaphoresis and fever.   HENT:  Negative for rhinorrhea, sinus pressure, sinus pain and sneezing.    Respiratory:  Negative for cough, chest tightness and wheezing.    Cardiovascular:  Negative for chest  "pain and leg swelling.   Gastrointestinal:  Positive for nausea and vomiting. Negative for abdominal distention, abdominal pain, anal bleeding and blood in stool.   Genitourinary:  Negative for decreased urine volume, difficulty urinating, dysuria, flank pain, hematuria and pelvic pain.   Neurological:  Positive for dizziness, weakness and light-headedness. Negative for seizures, syncope, facial asymmetry, speech difficulty, numbness and headaches.   Psychiatric/Behavioral:  Negative for confusion.        Objective    Objective   Vital Signs:  Visit Vitals  /86 (BP Location: Right arm, Patient Position: Sitting)   Pulse 98   Temp 36.3 °C (97.3 °F) (Temporal)   Resp 18   Ht 1.549 m (5' 1\")   Wt 91.6 kg (202 lb)   SpO2 99%   BMI 38.17 kg/m²   OB Status Perimenopausal   Smoking Status Never   BSA 1.99 m²        Physical Examination:    General: Not in acute distress, obesity class II, A&O x3, alert, coopertive, well-developed  HEENT: Normocephalic, atraumatic, EOMI, moist mucous membranes  Neck: Neck supple, trachea midline, no evidence of trauma  Cardiovascular: RRR, S1 and S2 appreciated, no murmurs rubs gallops appreciated, distal pulses 2+ bilaterally (radial and dorsalis pedis)  Respiratory: Vesicular breath sounds appreciated bilaterally, no adventitious sounds appreciated, no increased work of breathing  GI: Abdomen soft, nondistended, nontender to palpation, bowel sounds present  Extremities: Chronic lymphedema appreciated in upper/lower extremities bilaterally, no cyanosis  Neuro: A&O x3, no focal deficits, strength and sensation intact bilaterally  Skin: Warm and dry, without lesions or rashes  Psychiatric: Judgment intact.  Appropriate mood, affect and behavior.      Laboratory Data:  Results for orders placed or performed during the hospital encounter of 06/04/25 (from the past 24 hours)   ECG 12 lead   Result Value Ref Range    Ventricular Rate 112 BPM    Atrial Rate 112 BPM    AZ Interval 130 ms    " QRS Duration 66 ms    QT Interval 352 ms    QTC Calculation(Bazett) 480 ms    P Axis 49 degrees    R Axis 77 degrees    T Axis 22 degrees    QRS Count 18 beats    Q Onset 229 ms    P Onset 164 ms    P Offset 195 ms    T Offset 405 ms    QTC Fredericia 433 ms   CBC and Auto Differential   Result Value Ref Range    WBC 10.0 4.4 - 11.3 x10*3/uL    nRBC 0.0 0.0 - 0.0 /100 WBCs    RBC 4.61 4.00 - 5.20 x10*6/uL    Hemoglobin 14.0 12.0 - 16.0 g/dL    Hematocrit 40.6 36.0 - 46.0 %    MCV 88 80 - 100 fL    MCH 30.4 26.0 - 34.0 pg    MCHC 34.5 32.0 - 36.0 g/dL    RDW 13.4 11.5 - 14.5 %    Platelets 293 150 - 450 x10*3/uL    Neutrophils % 53.5 40.0 - 80.0 %    Immature Granulocytes %, Automated 0.2 0.0 - 0.9 %    Lymphocytes % 39.4 13.0 - 44.0 %    Monocytes % 5.1 2.0 - 10.0 %    Eosinophils % 1.4 0.0 - 6.0 %    Basophils % 0.4 0.0 - 2.0 %    Neutrophils Absolute 5.37 1.20 - 7.70 x10*3/uL    Immature Granulocytes Absolute, Automated 0.02 0.00 - 0.70 x10*3/uL    Lymphocytes Absolute 3.96 1.20 - 4.80 x10*3/uL    Monocytes Absolute 0.51 0.10 - 1.00 x10*3/uL    Eosinophils Absolute 0.14 0.00 - 0.70 x10*3/uL    Basophils Absolute 0.04 0.00 - 0.10 x10*3/uL   Comprehensive metabolic panel   Result Value Ref Range    Glucose 177 (H) 74 - 99 mg/dL    Sodium 136 136 - 145 mmol/L    Potassium 3.5 3.5 - 5.3 mmol/L    Chloride 100 98 - 107 mmol/L    Bicarbonate 20 (L) 21 - 32 mmol/L    Anion Gap 20 10 - 20 mmol/L    Urea Nitrogen 22 6 - 23 mg/dL    Creatinine 0.75 0.50 - 1.05 mg/dL    eGFR >90 >60 mL/min/1.73m*2    Calcium 10.0 8.6 - 10.3 mg/dL    Albumin 4.9 3.4 - 5.0 g/dL    Alkaline Phosphatase 60 33 - 110 U/L    Total Protein 7.3 6.4 - 8.2 g/dL    AST 24 9 - 39 U/L    Bilirubin, Total 0.5 0.0 - 1.2 mg/dL    ALT 31 7 - 45 U/L   Troponin I, High Sensitivity, Initial   Result Value Ref Range    Troponin I, High Sensitivity <3 0 - 13 ng/L   BLOOD GAS VENOUS FULL PANEL   Result Value Ref Range    POCT pH, Venous 7.63 (HH) 7.33 - 7.43 pH     POCT pCO2, Venous 22 (L) 41 - 51 mm Hg    POCT pO2, Venous 38 35 - 45 mm Hg    POCT SO2, Venous 74 45 - 75 %    POCT Oxy Hemoglobin, Venous 72.7 45.0 - 75.0 %    POCT Hematocrit Calculated, Venous 43.0 36.0 - 46.0 %    POCT Sodium, Venous 134 (L) 136 - 145 mmol/L    POCT Potassium, Venous 3.5 3.5 - 5.3 mmol/L    POCT Chloride, Venous 103 98 - 107 mmol/L    POCT Ionized Calicum, Venous 1.16 1.10 - 1.33 mmol/L    POCT Glucose, Venous 190 (H) 74 - 99 mg/dL    POCT Lactate, Venous 3.8 (H) 0.4 - 2.0 mmol/L    POCT Base Excess, Venous 3.8 (H) -2.0 - 3.0 mmol/L    POCT HCO3 Calculated, Venous 23.1 22.0 - 26.0 mmol/L    POCT Hemoglobin, Venous 14.2 12.0 - 16.0 g/dL    POCT Anion Gap, Venous 11.0 10.0 - 25.0 mmol/L    Patient Temperature      FiO2 21 %    Critical Called By MT RT     Critical Called To EDUARDO RN     Critical Call Time 1159     Critical Read Back Y    Sars-CoV-2 and Influenza A/B PCR   Result Value Ref Range    Flu A Result Not Detected Not Detected    Flu B Result Not Detected Not Detected    Coronavirus 2019, PCR Not Detected Not Detected   POCT GLUCOSE   Result Value Ref Range    POCT Glucose 135 (H) 74 - 99 mg/dL   Magnesium   Result Value Ref Range    Magnesium 1.79 1.60 - 2.40 mg/dL   Phosphorus   Result Value Ref Range    Phosphorus 1.3 (L) 2.5 - 4.9 mg/dL   Troponin, High Sensitivity, 1 Hour   Result Value Ref Range    Troponin I, High Sensitivity <3 0 - 13 ng/L   TSH with reflex to Free T4 if abnormal   Result Value Ref Range    Thyroid Stimulating Hormone 0.88 0.44 - 3.98 mIU/L       Imaging:  Imaging  CT angio chest for pulmonary embolism  Result Date: 6/4/2025  1.  No evidence of pulmonary embolism. 2. Distal esophageal mild wall thickening versus small hiatal hernia similar to prior. 3. Fatty liver.       MACRO: None.   Signed by: Yo Castellanos 6/4/2025 3:06 PM Dictation workstation:   [id number]    XR chest 1 view  Result Date: 6/4/2025  No radiographic evidence of acute cardiopulmonary  disease. Signed by Luis Lopez      Cardiology, Vascular, and Other Imaging  ECG 12 lead  Result Date: 6/4/2025  Sinus tachycardia Otherwise normal ECG When compared with ECG of 23-JUL-2024 11:14, Nonspecific T wave abnormality now evident in Anterior leads         Medications:  Scheduled medications  Scheduled Medications[4]  Continuous medications  Continuous Medications[5]  PRN medications  PRN Medications[6]    Assessment    Assessment & Plan   Ms. Rema Silver is a 49 y.o. female who presents with near syncopal-like event, associated nausea, vomiting.  Labyrinthitis/vestibular neuritis in the setting of postviral syndrome or URI as leading diagnosis currently. Differentials include but are not limited to ACS which is less likely with negative troponins, no ischemic changes on EKG, no complaints of chest pain, jaw, shoulder, or epigastric pain.  Cerebellar stroke is less likely with negative hints exam, patient denied occipital headache, dysmetria, facial hemiparesis, dysarthria, and nystagmus. Patient able to ambulate without difficulty.  Multiple endocrine neoplasia with pheochromocytoma less likely with no personal/family history of thyroid/thyroid or pancreatic dysfunction. Vasovagal syncope, heat exhaustion, vasomotor symptoms of menopause, anxiety/panic attack, migraine, are all differentials currently being ruled out.  Cardiology, Dr. Villanueva, was consulted by the emergency department and would like patient evaluated with TTE and stress test.    Assessment & Plan  SOB (shortness of breath)    #Diaphoresis  #Nausea/vomiting  #Near syncopal event  - Respiratory viral panel  - Continue PPI  - Consider Cardiology consult   -Consider TTE   -Telemetry    #Severe asthma  - Continue home ICS/LABA/LAMA plus montelukast  - DuoNebs as needed every 4 hours  - Methylprednisolone, given in emergency department    #Elevated lactate  Initial lactate on VBG was 3.8.  Second lactate 4.2.  This most likely elevated in  the setting of recent DuoNeb treatment in the emergency department.  Less likely sepsis, shock, hypovolemia, hypotension, postictal state, strenuous/prolonged exercise, medication induced.  - Trend lactate  - Daily labs    CHRONIC PROBLEMS:  # Vocal cord dysfunction  # GERD  # Anxiety/depression  # Diverticulitis  - Continue home medications as appropriate    Global Plan of Care  IVF: As needed  Diet: Regular  DVT prophylaxis: Lovenox  Dispo: 24-48-hour pending cardiology workup  Consults: Cardiology  Code Status: Full      Patient seen and discussed with the attending.  To be staffed with attending.  Signature: Kenrick Lee DO  Internal Medicine PGY-1 Resident  Date: 2025      Agree with the above documentation above by co-resident,  Michele Washington DO PGY2       [1]   Past Surgical History:  Procedure Laterality Date    BREAST SURGERY  2014    Breast Surgery Reduction Procedure     SECTION, CLASSIC  2014     Section    ESOPHAGOGASTRODUODENOSCOPY  2014    Diagnostic Esophagogastroduodenoscopy    LAPAROSCOPY DIAGNOSTIC / BIOPSY / ASPIRATION / LYSIS  2014    Exploratory Laparoscopy    OTHER SURGICAL HISTORY  2020    Complete colonoscopy    UMBILICAL HERNIA REPAIR  2014    Umbilical Hernia Repair   [2]   Social History  Socioeconomic History    Marital status:    Tobacco Use    Smoking status: Never    Smokeless tobacco: Never   Vaping Use    Vaping status: Never Used   Substance and Sexual Activity    Alcohol use: Yes     Comment: very rarely    Drug use: Never     Social Drivers of Health     Financial Resource Strain: Low Risk  (2024)    Overall Financial Resource Strain (CARDIA)     Difficulty of Paying Living Expenses: Not hard at all   Transportation Needs: No Transportation Needs (2024)    PRAPARE - Transportation     Lack of Transportation (Medical): No     Lack of Transportation (Non-Medical): No   Housing Stability: Low Risk   (9/7/2024)    Housing Stability Vital Sign     Unable to Pay for Housing in the Last Year: No     Number of Times Moved in the Last Year: 1     Homeless in the Last Year: No   [3]   Allergies  Allergen Reactions    Fentanyl Respiratory depression     Happened alone, unsure if this was the reaction    Opioids - Morphine Analogues Nausea/vomiting   [4] potassium phosphate, 21 mmol, intravenous, Once  [5]    [6]

## 2025-06-04 NOTE — ED PROVIDER NOTES
Emergency Department Provider Note        History of Present Illness     History provided by: Patient and Family Member  Limitations to History: Distracting Injury/Severe Pain  External Records Reviewed with Brief Summary: None    HPI:  Rema Silver is a 49 y.o. female presenting to the [facility] emergency department with a chief complaint of shortness of breath.  At around 10 AM the patient suddenly had an onset of short of breath, lightheadedness, palpitations, nausea, vomiting, and feeling lightheaded while she was at work.  There were no triggering factors that immediately happened before this.  The patient does have a history of asthma where she has been hospitalized before.  Patient has a history of pulmonary embolism, hyperlipidemia, diverticulitis, DOMINGO, and GERD as well.  The patient is experiencing pain anywhere with this event, she is just feeling nauseous and has been vomiting.    Physical Exam   Triage vitals:  T 36.3 °C (97.3 °F)  HR (!) 105  /78  RR 20  O2 99 % None (Room air)    General: Awake, alert, in acute distress  Eyes: Gaze conjugate.  No scleral icterus or injection  HENT: Normo-cephalic, atraumatic. No stridor  CV: Tachycardic rate, regular rhythm. Radial pulses 2+ bilaterally  Resp: Breathing non-labored, speaking in full sentences.  Clear to auscultation bilaterally  GI: Soft, non-distended, non-tender. No rebound or guarding.  : Soft, nontender, nondistended  MSK/Extremities: No gross bony deformities. Moving all extremities  Skin: Warm. Appropriate color  Neuro: Alert. Oriented. Face symmetric. Speech is fluent.  Gross strength intact in b/l UE and LEs with weakness, gross sensation patient reports to be feeling numb everywhere  Psych: Appropriate mood and affect    Medical Decision Making & ED Course   Medical Decision Makin y.o. female presenting with shortness of breath and fatigue while vomiting.  The patient was brought in in wheelchair, she was able to  ambulate from wheelchair to exam room bed with assistance.  Respiratory therapy provided DuoNebs and breathing treatment for the patient.  This has provided some relief for the patient.    A shortness of breath workup was performed where the EKG was showing tachycardia with no ST elevations.  The patient having the sudden onset of shortness of breath with diaphoresis was concerning for acute coronary syndrome, troponins will still continue to be measured.    The patient recently visited her pulmonologist for lesser asthma exacerbations caused by viral irritation and had been admitted to the hospital for severe asthma exacerbations over the previous years.    All the patient's labs were returning showing normal values except for her bicarb was reduced to 20, venous pH found to be 7.63 and her lactate levels found on venous blood gas were elevated as well.  The patient's weakness, shortness of breath, dizziness, and overall disposition were concerning for her to be undergoing some form of metabolic acidosis and rapidly breathing it off.    Constipation have been receiving breathing treatments, Solu-Medrol, magnesium sulfate, Zofran, and even sniffing an alcohol swab her vomiting, dizziness, and shortness of breath recovered very well.  Due to the patient's initial presentation and that she had sudden diaphoresis in combination we consulted cardiology to see if there was some cardiac involvement in this presentation for the patient and cardiology agreed to evaluate the patient even though her troponin levels were below 3.    Shared decision making for disposition  Patient and/or patient´s representative was counseled regarding labs, imaging, likely diagnosis. All questions were answered. Recommendation was made for Admission given the need for further escalation of care to inpatient management. Patient agreed and was admitted in stable condition. Admitting team was notified of any pending labs or imaging to ensure  continuity of care.     ----      Differential diagnoses considered include but are not limited to: Severe asthma exacerbation, pneumonia, acute coronary syndrome, aortic dissection ruled out due to clinical evaluation, labs and imaging, pulmonary embolism     Social Determinants of Health which Significantly Impact Care: None identified     EKG Independent Interpretation: EKG for the patient showed sinus tachycardia on arrival with a normal axis.  WY interval normal.  Narrow QRS duration.  QTc of 480.  No ST elevations noted.  No T wave inversions noted.    Independent Result Review and Interpretation: Relevant laboratory and radiographic results were reviewed and independently interpreted by myself.  As necessary, they are commented on in the ED Course.    Chronic conditions affecting the patient's care: As documented above in [facility]    The patient was discussed with the following consultants/services: Dr. Villanueva and Hospitalist/Admitting Provider who accepted the patient for admission    Care Considerations: As documented above in [facility]    ED Course:  ED Course as of 06/05/25 0005 Wed Jun 04, 2025   1238 POCT pH, Venous(!!): 7.63  With a bicarb of 20 [EDUARDO]   1416 Spoke with Dr. Villanueva, cardiology, patient will be put in observation echocardiogram will be ordered and he said that he will see her on consult [EDUARDO]   1428 PHOSPHORUS(!): 1.3 [CR]   1429 PHOSPHORUS(!): 1.3 [CR]   1529 Pt has 5/5 strength in all extremities, no numbness, no focal deficits, GCS 15 and significantly better appearing than on arrival.  She is calm in no pain.  She has no abdominal tenderness or tenderness to palpation.  Unclear etiology if it was a cardiac event or if she had a severe asthma exacerbation that caused some acidosis and subsequent electrolyte abnormalities but do wonder if her low phosphorus level was causing her whole body tingling/numbness.  She is improved significantly but feel warrants further observation of her symptoms,  electrolyte abnormalities and potentially cardiology consultation, will discuss with them disposition in the ER. [CR]      ED Course User Index  [CR] Ke Serra MD  [EDUARDO] Jack Mendiola DO         Diagnoses as of 06/05/25 0005   Shortness of breath   Nausea and vomiting, unspecified vomiting type     Disposition   As a result of their workup, the patient will require admission to the hospital.  The patient was informed of her diagnosis.  The patient was given the opportunity to ask questions and I answered them. The patient agreed to be admitted to the hospital.    Procedures   Procedures    Patient seen and discussed with ED attending physician.    Jack Mendiola DO  Emergency Medicine     Jack Mendiola DO  Resident  06/05/25 0005

## 2025-06-04 NOTE — ED TRIAGE NOTES
"Pt reports she was at work and began having palpitations, nausea, vomiting, and lightheadedness. Pt denies any pain. Pt reports numbness to \"everywhere\" bilaterally.   "

## 2025-06-05 ENCOUNTER — APPOINTMENT (OUTPATIENT)
Dept: CARDIOLOGY | Facility: HOSPITAL | Age: 50
End: 2025-06-05
Payer: COMMERCIAL

## 2025-06-05 VITALS
DIASTOLIC BLOOD PRESSURE: 72 MMHG | HEART RATE: 91 BPM | SYSTOLIC BLOOD PRESSURE: 127 MMHG | BODY MASS INDEX: 38.14 KG/M2 | RESPIRATION RATE: 16 BRPM | OXYGEN SATURATION: 98 % | HEIGHT: 61 IN | TEMPERATURE: 97.3 F | WEIGHT: 202 LBS

## 2025-06-05 LAB
ALBUMIN SERPL BCP-MCNC: 3.9 G/DL (ref 3.4–5)
ANION GAP SERPL CALC-SCNC: 11 MMOL/L (ref 10–20)
ATRIAL RATE: 112 BPM
BNP SERPL-MCNC: 6 PG/ML (ref 0–99)
BUN SERPL-MCNC: 14 MG/DL (ref 6–23)
CALCIUM SERPL-MCNC: 9.1 MG/DL (ref 8.6–10.3)
CHLORIDE SERPL-SCNC: 106 MMOL/L (ref 98–107)
CHOLEST SERPL-MCNC: 211 MG/DL (ref 0–199)
CHOLESTEROL/HDL RATIO: 3.3
CO2 SERPL-SCNC: 24 MMOL/L (ref 21–32)
CREAT SERPL-MCNC: 0.55 MG/DL (ref 0.5–1.05)
EGFRCR SERPLBLD CKD-EPI 2021: >90 ML/MIN/1.73M*2
ERYTHROCYTE [DISTWIDTH] IN BLOOD BY AUTOMATED COUNT: 13.9 % (ref 11.5–14.5)
EST. AVERAGE GLUCOSE BLD GHB EST-MCNC: 117 MG/DL
GLUCOSE SERPL-MCNC: 105 MG/DL (ref 74–99)
HBA1C MFR BLD: 5.7 % (ref ?–5.7)
HCT VFR BLD AUTO: 36.7 % (ref 36–46)
HDLC SERPL-MCNC: 63.3 MG/DL
HGB BLD-MCNC: 12.2 G/DL (ref 12–16)
HOLD SPECIMEN: 293
HOLD SPECIMEN: NORMAL
LACTATE SERPL-SCNC: 1.8 MMOL/L (ref 0.4–2)
LDLC SERPL CALC-MCNC: 123 MG/DL
MAGNESIUM SERPL-MCNC: 2.04 MG/DL (ref 1.6–2.4)
MCH RBC QN AUTO: 30.3 PG (ref 26–34)
MCHC RBC AUTO-ENTMCNC: 33.2 G/DL (ref 32–36)
MCV RBC AUTO: 91 FL (ref 80–100)
NON HDL CHOLESTEROL: 148 MG/DL (ref 0–149)
NRBC BLD-RTO: 0 /100 WBCS (ref 0–0)
P AXIS: 49 DEGREES
P OFFSET: 195 MS
P ONSET: 164 MS
PHOSPHATE SERPL-MCNC: 3.4 MG/DL (ref 2.5–4.9)
PLATELET # BLD AUTO: 282 X10*3/UL (ref 150–450)
POTASSIUM SERPL-SCNC: 4.1 MMOL/L (ref 3.5–5.3)
PR INTERVAL: 130 MS
Q ONSET: 229 MS
QRS COUNT: 18 BEATS
QRS DURATION: 66 MS
QT INTERVAL: 352 MS
QTC CALCULATION(BAZETT): 480 MS
QTC FREDERICIA: 433 MS
R AXIS: 77 DEGREES
RBC # BLD AUTO: 4.02 X10*6/UL (ref 4–5.2)
SODIUM SERPL-SCNC: 137 MMOL/L (ref 136–145)
T AXIS: 22 DEGREES
T OFFSET: 405 MS
TRIGL SERPL-MCNC: 125 MG/DL (ref 0–149)
VENTRICULAR RATE: 112 BPM
VLDL: 25 MG/DL (ref 0–40)
WBC # BLD AUTO: 17.7 X10*3/UL (ref 4.4–11.3)

## 2025-06-05 PROCEDURE — 2500000004 HC RX 250 GENERAL PHARMACY W/ HCPCS (ALT 636 FOR OP/ED)

## 2025-06-05 PROCEDURE — 93005 ELECTROCARDIOGRAM TRACING: CPT

## 2025-06-05 PROCEDURE — 94640 AIRWAY INHALATION TREATMENT: CPT

## 2025-06-05 PROCEDURE — 96375 TX/PRO/DX INJ NEW DRUG ADDON: CPT | Mod: 59

## 2025-06-05 PROCEDURE — 83605 ASSAY OF LACTIC ACID: CPT | Performed by: STUDENT IN AN ORGANIZED HEALTH CARE EDUCATION/TRAINING PROGRAM

## 2025-06-05 PROCEDURE — 99236 HOSP IP/OBS SAME DATE HI 85: CPT | Performed by: STUDENT IN AN ORGANIZED HEALTH CARE EDUCATION/TRAINING PROGRAM

## 2025-06-05 PROCEDURE — 36415 COLL VENOUS BLD VENIPUNCTURE: CPT

## 2025-06-05 PROCEDURE — G0378 HOSPITAL OBSERVATION PER HR: HCPCS

## 2025-06-05 PROCEDURE — 96376 TX/PRO/DX INJ SAME DRUG ADON: CPT | Mod: 59

## 2025-06-05 PROCEDURE — 2500000001 HC RX 250 WO HCPCS SELF ADMINISTERED DRUGS (ALT 637 FOR MEDICARE OP)

## 2025-06-05 PROCEDURE — 85027 COMPLETE CBC AUTOMATED: CPT

## 2025-06-05 PROCEDURE — 2500000002 HC RX 250 W HCPCS SELF ADMINISTERED DRUGS (ALT 637 FOR MEDICARE OP, ALT 636 FOR OP/ED): Performed by: STUDENT IN AN ORGANIZED HEALTH CARE EDUCATION/TRAINING PROGRAM

## 2025-06-05 PROCEDURE — 2500000002 HC RX 250 W HCPCS SELF ADMINISTERED DRUGS (ALT 637 FOR MEDICARE OP, ALT 636 FOR OP/ED)

## 2025-06-05 PROCEDURE — 83735 ASSAY OF MAGNESIUM: CPT

## 2025-06-05 PROCEDURE — 80069 RENAL FUNCTION PANEL: CPT

## 2025-06-05 PROCEDURE — 96361 HYDRATE IV INFUSION ADD-ON: CPT

## 2025-06-05 PROCEDURE — 93306 TTE W/DOPPLER COMPLETE: CPT

## 2025-06-05 RX ORDER — ONDANSETRON HYDROCHLORIDE 2 MG/ML
4 INJECTION, SOLUTION INTRAVENOUS ONCE
Status: COMPLETED | OUTPATIENT
Start: 2025-06-05 | End: 2025-06-05

## 2025-06-05 RX ORDER — ONDANSETRON 8 MG/1
8 TABLET, FILM COATED ORAL EVERY 8 HOURS PRN
Qty: 15 TABLET | Refills: 0 | Status: SHIPPED | OUTPATIENT
Start: 2025-06-05 | End: 2025-06-10

## 2025-06-05 RX ORDER — METOCLOPRAMIDE HYDROCHLORIDE 5 MG/ML
10 INJECTION INTRAMUSCULAR; INTRAVENOUS ONCE
Status: COMPLETED | OUTPATIENT
Start: 2025-06-05 | End: 2025-06-05

## 2025-06-05 RX ORDER — DIPHENHYDRAMINE HCL 25 MG
25 TABLET ORAL EVERY 6 HOURS PRN
Status: DISCONTINUED | OUTPATIENT
Start: 2025-06-05 | End: 2025-06-05 | Stop reason: HOSPADM

## 2025-06-05 RX ORDER — KETOROLAC TROMETHAMINE 30 MG/ML
30 INJECTION, SOLUTION INTRAMUSCULAR; INTRAVENOUS ONCE
Status: COMPLETED | OUTPATIENT
Start: 2025-06-05 | End: 2025-06-05

## 2025-06-05 RX ADMIN — SODIUM CHLORIDE 1000 ML: 0.9 INJECTION, SOLUTION INTRAVENOUS at 12:36

## 2025-06-05 RX ADMIN — PANTOPRAZOLE SODIUM 80 MG: 40 TABLET, DELAYED RELEASE ORAL at 06:12

## 2025-06-05 RX ADMIN — DIPHENHYDRAMINE HYDROCHLORIDE 25 MG: 25 TABLET ORAL at 11:08

## 2025-06-05 RX ADMIN — FLUTICASONE PROPIONATE 1 SPRAY: 50 SPRAY, METERED NASAL at 08:17

## 2025-06-05 RX ADMIN — MONTELUKAST 10 MG: 10 TABLET, FILM COATED ORAL at 08:17

## 2025-06-05 RX ADMIN — FORMOTEROL FUMARATE 20 MCG: 20 SOLUTION RESPIRATORY (INHALATION) at 07:59

## 2025-06-05 RX ADMIN — KETOROLAC TROMETHAMINE 30 MG: 30 INJECTION, SOLUTION INTRAMUSCULAR at 12:35

## 2025-06-05 RX ADMIN — SODIUM CHLORIDE, POTASSIUM CHLORIDE, SODIUM LACTATE AND CALCIUM CHLORIDE 500 ML: 600; 310; 30; 20 INJECTION, SOLUTION INTRAVENOUS at 00:54

## 2025-06-05 RX ADMIN — BUDESONIDE 0.5 MG: 0.5 INHALANT RESPIRATORY (INHALATION) at 07:59

## 2025-06-05 RX ADMIN — ONDANSETRON 4 MG: 2 INJECTION INTRAMUSCULAR; INTRAVENOUS at 08:35

## 2025-06-05 RX ADMIN — ACETAMINOPHEN, CAFFEINE 2 TABLET: 500; 65 TABLET, FILM COATED ORAL at 11:08

## 2025-06-05 RX ADMIN — Medication 1000 MCG: at 08:17

## 2025-06-05 RX ADMIN — Medication 1 TABLET: at 08:17

## 2025-06-05 RX ADMIN — VENLAFAXINE HYDROCHLORIDE 75 MG: 75 CAPSULE, EXTENDED RELEASE ORAL at 08:17

## 2025-06-05 RX ADMIN — IPRATROPIUM BROMIDE AND ALBUTEROL SULFATE 3 ML: 2.5; .5 SOLUTION RESPIRATORY (INHALATION) at 08:02

## 2025-06-05 RX ADMIN — METOCLOPRAMIDE 10 MG: 5 INJECTION, SOLUTION INTRAMUSCULAR; INTRAVENOUS at 12:35

## 2025-06-05 RX ADMIN — IPRATROPIUM BROMIDE AND ALBUTEROL SULFATE 3 ML: 2.5; .5 SOLUTION RESPIRATORY (INHALATION) at 13:38

## 2025-06-05 ASSESSMENT — COGNITIVE AND FUNCTIONAL STATUS - GENERAL
MOBILITY SCORE: 24
DAILY ACTIVITIY SCORE: 24

## 2025-06-05 ASSESSMENT — ACTIVITIES OF DAILY LIVING (ADL): LACK_OF_TRANSPORTATION: NO

## 2025-06-05 ASSESSMENT — PAIN - FUNCTIONAL ASSESSMENT
PAIN_FUNCTIONAL_ASSESSMENT: 0-10
PAIN_FUNCTIONAL_ASSESSMENT: 0-10

## 2025-06-05 ASSESSMENT — PAIN SCALES - GENERAL
PAINLEVEL_OUTOF10: 6
PAINLEVEL_OUTOF10: 6

## 2025-06-05 ASSESSMENT — PAIN DESCRIPTION - DESCRIPTORS
DESCRIPTORS: HEADACHE
DESCRIPTORS: ACHING;HEADACHE

## 2025-06-05 ASSESSMENT — PAIN DESCRIPTION - LOCATION: LOCATION: HEAD

## 2025-06-05 NOTE — HOSPITAL COURSE
Ms. Rema Silver is a 49 y.o. female who presents with near syncopal-like event, associated nausea, vomiting.  Labyrinthitis/vestibular neuritis in the setting of postviral syndrome or URI as leading diagnosis currently. Differentials included but were not limited to ACS which is less likely with negative troponins, no ischemic changes on EKG, no complaints of chest pain, jaw, shoulder, or epigastric pain.  Cerebellar stroke was less likely with negative hints exam, patient denied occipital headache, dysmetria, facial hemiparesis, dysarthria, and nystagmus. Patient able to ambulate without difficulty.  Multiple endocrine neoplasia with pheochromocytoma less likely with no personal/family history of thyroid/thyroid or pancreatic dysfunction. Vasovagal syncope, heat exhaustion, vasomotor symptoms of menopause, anxiety/panic attack, migraine, are all differentials currently being ruled out.  Cardiology was consulted by the emergency department and would like patient evaluated with TTE and stress test.     In the emergency department patient was afebrile, tachycardic (post DuoNeb administration), respiratory rate 20, blood pressure 139/78, SpO2 99% on room air no complaints of wheezing.  Initial VBG showed a respiratory alkalosis with a pH of 7.63, pCO2 22, HCO3 23.1, lactate 3.8.  Troponins x 2 were negative, EKG was negative for ischemic changes, TSH/CBC was unremarkable.  CT PE did not show any acute pulmonary embolisms or pulmonary findings.  Negative tox screen for salicylates, EtOH.  Despite patient not having significant symptoms of asthma exacerbation wheezing, chest tightness, cough she was given steroids, bronchodilators and magnesium Zofran fluids for nausea vomiting and hypomagnesemia of 1.79.  Cardiology was consulted by the emergency department who wanted to admit the patient for stress test and an echocardiogram.    On admission further discussion with cardiology resulted in canceling stress test  going forward with the echocardiogram.  Patient was able to tolerate full diet with no problems.  Patient continued to have a 6 out of 10 headache was subsequently given migraine cocktail (NS 1 L, Toradol 30 mg IV, Reglan 10 mg IV, including patient's home Excedrin and diphenhydramine.  Patient stated that headache did not resolve.  Headache was bilateral frontal with no occipital/cervical pain.  After echocardiogram patient was discharged hemodynamically stable with strict return precautions.  Patient expressed understanding and was agreeable to the plan at this time.  Patient was also given instructions to follow-up with PCP and cardiology within 1 week for further management.

## 2025-06-05 NOTE — DISCHARGE INSTRUCTIONS
You were treated for nausea, vomiting, near syncopal event    You are sent home with the following new prescriptions: Zofran 8 mg by mouth every 8 hours as needed for nausea.    Please continue to take home medications as prescribed.     For medication refills, please contact your primary care doctor.     For referral to cardiology, Dr. Villanueva, has been placed on your behalf.    Please call your primary care doctor in 2-3 days to schedule follow up appointment for this hospitalization and further management of your care.     If you get worse, or develop any concerning or worrisome symptoms call your Primary Care Doctor or return to the Emergency Department.     -[facility] Internal Medicine Teaching Service

## 2025-06-05 NOTE — PROGRESS NOTES
06/05/25 1109   Discharge Planning   Living Arrangements Children   Support Systems Children   Assistance Needed none   Type of Residence Private residence   Home or Post Acute Services None   Expected Discharge Disposition Home   Does the patient need discharge transport arranged? No   Financial Resource Strain   How hard is it for you to pay for the very basics like food, housing, medical care, and heating? Not very   Housing Stability   In the last 12 months, was there a time when you were not able to pay the mortgage or rent on time? N   At any time in the past 12 months, were you homeless or living in a shelter (including now)? N   Transportation Needs   In the past 12 months, has lack of transportation kept you from medical appointments or from getting medications? no   In the past 12 months, has lack of transportation kept you from meetings, work, or from getting things needed for daily living? No   Intensity of Service   Intensity of Service 0-30 min     Spoke to patient at bedside to explain my role in discharge planning. Patient states she lives at home with her adult children. PCP is Claire Nicole. She uses [facility] in [place] for prescriptions. She  feels she effectively manages her health at home and plans to return home when medically ready.

## 2025-06-05 NOTE — CARE PLAN
The patient's goals for the shift include      The clinical goals for the shift include remain free of falls and injury    Over the shift, the patient did not make progress toward the following goals. Barriers to progression include ***. Recommendations to address these barriers include ***.    Problem: Pain - Adult  Goal: Verbalizes/displays adequate comfort level or baseline comfort level  Outcome: Not Progressing     Problem: Safety - Adult  Goal: Free from fall injury  Outcome: Not Progressing     Problem: Discharge Planning  Goal: Discharge to home or other facility with appropriate resources  Outcome: Not Progressing     Problem: Chronic Conditions and Co-morbidities  Goal: Patient's chronic conditions and co-morbidity symptoms are monitored and maintained or improved  Outcome: Not Progressing     Problem: Nutrition  Goal: Nutrient intake appropriate for maintaining nutritional needs  Outcome: Not Progressing     Problem: Fall/Injury  Goal: Not fall by end of shift  Outcome: Not Progressing  Goal: Be free from injury by end of the shift  Outcome: Not Progressing  Goal: Verbalize understanding of personal risk factors for fall in the hospital  Outcome: Not Progressing  Goal: Verbalize understanding of risk factor reduction measures to prevent injury from fall in the home  Outcome: Not Progressing  Goal: Pace activities to prevent fatigue by end of the shift  Outcome: Not Progressing     Problem: Skin  Goal: Participates in plan/prevention/treatment measures  Outcome: Not Progressing  Goal: Prevent/manage excess moisture  Outcome: Not Progressing  Goal: Prevent/minimize sheer/friction injuries  Outcome: Not Progressing  Goal: Promote/optimize nutrition  Outcome: Not Progressing  Goal: Promote skin healing  Outcome: Not Progressing     Problem: ACS/CP/NSTEMI/STEMI  Goal: Chest pain managed (free from pain or at acceptable level)  Outcome: Not Progressing  Goal: Lab values return to normal range  Outcome: Not  Progressing  Goal: Promote self management  Outcome: Not Progressing  Goal: Serial ECG will return to baseline  Outcome: Not Progressing  Goal: Verbalize understanding of procedures/devices  Outcome: Not Progressing

## 2025-06-05 NOTE — CARE PLAN
The patient's goals for the shift include    sleep  The clinical goals for the shift include remain free of falls and injury    Problem: Pain - Adult  Goal: Verbalizes/displays adequate comfort level or baseline comfort level  Outcome: Progressing     Problem: Safety - Adult  Goal: Free from fall injury  Outcome: Progressing     Problem: Discharge Planning  Goal: Discharge to home or other facility with appropriate resources  Outcome: Progressing     Problem: Chronic Conditions and Co-morbidities  Goal: Patient's chronic conditions and co-morbidity symptoms are monitored and maintained or improved  Outcome: Progressing     Problem: Nutrition  Goal: Nutrient intake appropriate for maintaining nutritional needs  Outcome: Progressing

## 2025-06-05 NOTE — CARE PLAN
The patient's goals for the shift include Comfort     The clinical goals for the shift include Pt will remain free from injury

## 2025-06-05 NOTE — CONSULTS
"CARDIOLOGY SERVICE - Initial CONSULT    CVM Dr. Devonte Villanueva  [facility]    PATIENT NAME: Rema Silver  PATIENT MRN: [id number]    SERVICE DATE:  6/5/2025  SERVICE TIME: 6:14 AM    CONSULTANT: Devonte Villanueva MD [facility]  PRIMARY CARE PHYSICIAN: Claire Duron MD  ATTENDING PHYSICIAN: Lisa Lim MD    IMPRESSIONS  Admitted with sudden onset of shortness of breath lightheadedness near syncope emesis  Lactate 4.7, likely noncardiac but will check BMP and echo  Hyperlipidemia  history of asthma DOMINGO PE GERD, fatty    RECOMMENDATIONS  Telemetry  Echo  Asthma management    Thank you for this consultation.  =============================================================    REASON FOR CONSULTATION: Shortness of breath  Chief Complaint   Patient presents with    Dizziness    Vomiting    Palpitations    Shortness of Breath     /52   Pulse 97   Temp 36.4 °C (97.5 °F)   Resp 18   Ht 1.549 m (5' 1\")   Wt 91.6 kg (202 lb)   SpO2 98%   BMI 38.17 kg/m²   MEDICATIONS:  Medications Ordered Prior to Encounter[1]    HISTORY: Rema Silver is a 49 y.o. female in with sudden onset of shortness of breath associated with palpitation and lightheadedness while at work, associated also with emesis no distinct chest pain, known history of asthma and PE    BNP   Date/Time Value Ref Range Status   07/23/2024 09:57 AM 9 0 - 99 pg/mL Final     Lab Results   Component Value Date    CHOL 221 (H) 09/23/2024     Lab Results   Component Value Date    HDL 52.0 09/23/2024     Lab Results   Component Value Date    LDLCALC 126 (H) 09/23/2024     Lab Results   Component Value Date    TRIG 213 (H) 09/23/2024     No components found for: \"CHOLHDL\"  TROPHS   Date/Time Value Ref Range Status   06/04/2025 01:26 PM <3 0 - 13 ng/L Final   06/04/2025 11:57 AM <3 0 - 13 ng/L Final   07/23/2024 12:01 PM <3 0 - 13 ng/L Final       Echo  No results found for this or any previous visit.    Encounter Date: 06/04/25   ECG 12 lead   Result Value    " Ventricular Rate 112    Atrial Rate 112    NH Interval 130    QRS Duration 66    QT Interval 352    QTC Calculation(Bazett) 480    P Axis 49    R Axis 77    T Axis 22    QRS Count 18    Q Onset 229    P Onset 164    P Offset 195    T Offset 405    QTC Fredericia 433    Narrative    Sinus tachycardia  Otherwise normal ECG  When compared with ECG of 23-JUL-2024 11:14,  Nonspecific T wave abnormality now evident in Anterior leads     PAST MEDICAL HISTORY:    Medical History[2]  PAST SURGICAL HISTORY:  Surgical History[3]  ALLERGIES AND DRUG INTOLERANCES: Allergies[4]  FAMILY HISTORY:  Family History[5]  SOCIAL HISTORY:    Social History     Socioeconomic History    Marital status:      Spouse name: Not on file    Number of children: Not on file    Years of education: Not on file    Highest education level: Not on file   Occupational History    Not on file   Tobacco Use    Smoking status: Never    Smokeless tobacco: Never   Vaping Use    Vaping status: Never Used   Substance and Sexual Activity    Alcohol use: Yes     Comment: very rarely    Drug use: Never    Sexual activity: Not on file   Other Topics Concern    Not on file   Social History Narrative    Not on file     Social Drivers of Health     Financial Resource Strain: Patient Declined (6/4/2025)    Overall Financial Resource Strain (CARDIA)     Difficulty of Paying Living Expenses: Patient declined   Food Insecurity: Patient Declined (6/4/2025)    Hunger Vital Sign     Worried About Running Out of Food in the Last Year: Patient declined     Ran Out of Food in the Last Year: Patient declined   Transportation Needs: Patient Declined (6/4/2025)    PRAPARE - Transportation     Lack of Transportation (Medical): Patient declined     Lack of Transportation (Non-Medical): Patient declined   Physical Activity: Not on file   Stress: Not on file   Social Connections: Not on file   Intimate Partner Violence: Patient Declined (6/4/2025)    Humiliation, Afraid, Rape,  and Kick questionnaire     Fear of Current or Ex-Partner: Patient declined     Emotionally Abused: Patient declined     Physically Abused: Patient declined     Sexually Abused: Patient declined   Housing Stability: Patient Declined (6/4/2025)    Housing Stability Vital Sign     Unable to Pay for Housing in the Last Year: Patient declined     Number of Times Moved in the Last Year: 0     Homeless in the Last Year: Patient declined     COMPLETE REVIEW OF SYSTEMS:  12 systems were reviewed with the patient heart and a chart     PHYSICAL EXAM:  Not in distress  Awake alert  Heart RRR  Lungs decreased BSs  Extremities no edema    LABS:  Lab Results   Component Value Date    GLUCOSE 177 (H) 06/04/2025    CALCIUM 10.0 06/04/2025     06/04/2025    K 3.5 06/04/2025    CO2 20 (L) 06/04/2025     06/04/2025    BUN 22 06/04/2025    CREATININE 0.75 06/04/2025      Lab Results   Component Value Date    WBC 10.0 06/04/2025    HGB 14.0 06/04/2025    HCT 40.6 06/04/2025    MCV 88 06/04/2025     06/04/2025        This clinical note has been produced using speech recognition software and may contain errors related to that system including grammar, punctuation, spelling, gender and words and phrases that may be inappropriate. Some errors were not noted in checking the note before saving.     SIGNATURE: Devonte Villanueva MD [facility]  OFFICE: [phone]         [1]   No current facility-administered medications on file prior to encounter.     Current Outpatient Medications on File Prior to Encounter   Medication Sig Dispense Refill    cetirizine (ZyrTEC) 10 mg tablet TAKE 1 TABLET BY MOUTH ONCE DAILY AS NEEDED FOR ALLERGIES 30 tablet 11    cyanocobalamin (Vitamin B-12) 1,000 mcg tablet Take 1 tablet (1,000 mcg) by mouth once daily.      diphenhydrAMINE (BENADryl) 25 mg capsule Take 1 capsule (25 mg) by mouth if needed for other (migraine) for up to 5 days. 5 capsule 0    fluticasone (Flonase) 50 mcg/actuation nasal spray  Administer 1 spray into each nostril once daily. Shake gently. Before first use, prime pump. After use, clean tip and replace cap. 16 g 11    fluticasone propion-salmeteroL (Advair Diskus) 500-50 mcg/dose diskus inhaler Inhale 1 puff 2 times a day. Rinse mouth with water after use to reduce aftertaste and incidence of candidiasis. Do not swallow. 60 each 11    ipratropium-albuteroL (Duo-Neb) 0.5-2.5 mg/3 mL nebulizer solution Take 3 mL by nebulization 4 times a day as needed for wheezing. 720 mL 3    magnesium oxide 500 mg magnesium tablet Take 1 tablet (500 mg) by mouth once daily.      montelukast (Singulair) 10 mg tablet Take 1 tablet by mouth once daily 90 tablet 3    pantoprazole (ProtoNix) 40 mg EC tablet Take 1 tablet by mouth twice daily (Patient taking differently: Take 2 tablets (80 mg) by mouth once daily in the morning. Take before meals.) 60 tablet 3    Spiriva Respimat 2.5 mcg/actuation inhaler INHALE 2 SPRAY(S) BY MOUTH ONCE DAILY (Patient taking differently: Inhale 2 puffs once daily.) 4 g 6    venlafaxine XR (Effexor XR) 75 mg 24 hr capsule Take 1 capsule (75 mg) by mouth once daily. Do not crush or chew. 30 capsule 11    buPROPion XL (Wellbutrin XL) 150 mg 24 hr tablet Take 1 tablet (150 mg) by mouth once daily in the morning. Do not crush, chew, or split. 30 tablet 1   [2]   Past Medical History:  Diagnosis Date    Acute non-ST elevation myocardial infarction (NSTEMI) (Multi) 06/04/2018    Asthma     Contact with and (suspected) exposure to covid-19 08/13/2023    Cough, unspecified 08/24/2022    Disease due to severe acute respiratory syndrome coronavirus 2 (SARS-CoV-2) 09/07/2024    Diverticulitis     Edema 09/07/2024    Edema of both lower extremities 09/07/2024    Essential (primary) hypertension 09/10/2022    Migraines     Personal history of COVID-19 12/21/2022    Personal history of other diseases of the respiratory system     History of acute bronchitis    Plantar fasciitis, left 03/06/2018     PONV (postoperative nausea and vomiting)     Sprain of left foot 04/10/2018    Sprain of medial collateral ligament of knee 2020   [3]   Past Surgical History:  Procedure Laterality Date    BREAST SURGERY  2014    Breast Surgery Reduction Procedure     SECTION, CLASSIC  2014     Section    ESOPHAGOGASTRODUODENOSCOPY  2014    Diagnostic Esophagogastroduodenoscopy    LAPAROSCOPY DIAGNOSTIC / BIOPSY / ASPIRATION / LYSIS  2014    Exploratory Laparoscopy    OTHER SURGICAL HISTORY  2020    Complete colonoscopy    UMBILICAL HERNIA REPAIR  2014    Umbilical Hernia Repair   [4]   Allergies  Allergen Reactions    Fentanyl Respiratory depression     Happened alone, unsure if this was the reaction    Opioids - Morphine Analogues Nausea/vomiting   [5]   Family History  Problem Relation Name Age of Onset    HOLLY disease Maternal Grandfather      Newman's esophagus Maternal Grandfather      Asthma Other Aunt

## 2025-06-05 NOTE — DISCHARGE SUMMARY
Discharge Diagnosis  SOB (shortness of breath)       Issues Requiring Follow-Up  Consider holter monitor after cardiology follow up  Discharge Meds     Medication List      START taking these medications     ondansetron 8 mg tablet; Commonly known as: Zofran; Take 1 tablet (8 mg)   by mouth every 8 hours if needed for nausea or vomiting for up to 5 days.     CHANGE how you take these medications     Spiriva Respimat 2.5 mcg/actuation inhaler; Generic drug: tiotropium;   INHALE 2 SPRAY(S) BY MOUTH ONCE DAILY; What changed: See the new   instructions.     CONTINUE taking these medications     buPROPion  mg 24 hr tablet; Commonly known as: Wellbutrin XL; Take   1 tablet (150 mg) by mouth once daily in the morning. Do not crush, chew,   or split.   cetirizine 10 mg tablet; Commonly known as: ZyrTEC; TAKE 1 TABLET BY   MOUTH ONCE DAILY AS NEEDED FOR ALLERGIES   cyanocobalamin 1,000 mcg tablet; Commonly known as: Vitamin B-12   diphenhydrAMINE 25 mg capsule; Commonly known as: BENADryl; Take 1   capsule (25 mg) by mouth if needed for other (migraine) for up to 5 days.   fluticasone 50 mcg/actuation nasal spray; Commonly known as: Flonase;   Administer 1 spray into each nostril once daily. Shake gently. Before   first use, prime pump. After use, clean tip and replace cap.   fluticasone propion-salmeteroL 500-50 mcg/dose diskus inhaler; Commonly   known as: Advair Diskus; Inhale 1 puff 2 times a day. Rinse mouth with   water after use to reduce aftertaste and incidence of candidiasis. Do not   swallow.   ipratropium-albuteroL 0.5-2.5 mg/3 mL nebulizer solution; Commonly known   as: Duo-Neb; Take 3 mL by nebulization 4 times a day as needed for   wheezing.   magnesium oxide 500 mg magnesium tablet   montelukast 10 mg tablet; Commonly known as: Singulair; Take 1 tablet by   mouth once daily   pantoprazole 40 mg EC tablet; Commonly known as: ProtoNix; Take 1 tablet   by mouth twice daily   venlafaxine XR 75 mg 24 hr  capsule; Commonly known as: Effexor XR; Take   1 capsule (75 mg) by mouth once daily. Do not crush or chew.       Test Results Pending At Discharge  Pending Labs       No current pending labs.            Hospital Course  Ms. Rema Silver is a 49 y.o. female who presents with near syncopal-like event, associated nausea, vomiting.  Labyrinthitis/vestibular neuritis in the setting of postviral syndrome or URI as leading diagnosis currently. Differentials included but were not limited to ACS which is less likely with negative troponins, no ischemic changes on EKG, no complaints of chest pain, jaw, shoulder, or epigastric pain.  Cerebellar stroke was less likely with negative hints exam, patient denied occipital headache, dysmetria, facial hemiparesis, dysarthria, and nystagmus. Patient able to ambulate without difficulty.  Multiple endocrine neoplasia with pheochromocytoma less likely with no personal/family history of thyroid/thyroid or pancreatic dysfunction. Vasovagal syncope, heat exhaustion, vasomotor symptoms of menopause, anxiety/panic attack, migraine, are all differentials currently being ruled out.  Cardiology was consulted by the emergency department and would like patient evaluated with TTE and stress test.     In the emergency department patient was afebrile, tachycardic (post DuoNeb administration), respiratory rate 20, blood pressure 139/78, SpO2 99% on room air no complaints of wheezing.  Initial VBG showed a respiratory alkalosis with a pH of 7.63, pCO2 22, HCO3 23.1, lactate 3.8.  Troponins x 2 were negative, EKG was negative for ischemic changes, TSH/CBC was unremarkable.  CT PE did not show any acute pulmonary embolisms or pulmonary findings.  Negative tox screen for salicylates, EtOH.  Despite patient not having significant symptoms of asthma exacerbation wheezing, chest tightness, cough she was given steroids, bronchodilators and magnesium Zofran fluids for nausea vomiting and hypomagnesemia  of 1.79.  Cardiology was consulted by the emergency department who wanted to admit the patient for stress test and an echocardiogram.    On admission further discussion with cardiology resulted in canceling stress test going forward with the echocardiogram.  Patient was able to tolerate full diet with no problems.  Patient continued to have a 6 out of 10 headache was subsequently given migraine cocktail (NS 1 L, Toradol 30 mg IV, Reglan 10 mg IV, including patient's home Excedrin and diphenhydramine.  Patient stated that headache did not resolve.  Headache was bilateral frontal with no occipital/cervical pain.  After echocardiogram patient was discharged hemodynamically stable with strict return precautions.  Patient expressed understanding and was agreeable to the plan at this time.  Patient was also given instructions to follow-up with PCP and cardiology within 1 week for further management.    Pertinent Physical Exam At Time of Discharge  Physical Exam  Well developed female in no acute distress  Alert and oriented x 3. No facial droop, no dysarthria, speech fluent. Able to move all 4 extremities to command  Mood and affect appropriate  RRR< normal S1 and S2, no ectopy appreciated, no murmurs  Lungs clear bilaterally, no wheezes or rales, good air entry   Abdomen soft and nontender with good bowel sounds     Outpatient Follow-Up  Future Appointments   Date Time Provider Department Center   1/8/2026  8:50 AM Diana Lovett DO [id number] spent a total of 55 minutes in the overall professional care of this patient including independent review of medical records, lab studies, imaging studies, and discussion with consultants and face to face time with patient.    Lisa Lim MD

## 2025-06-05 NOTE — NURSING NOTE
0711 Dr Villanueva at bedside.   0818 Dr Lee at bedside. Pt requesting zofran for nausea.   0838 pt medicated with Zofran for nausea.   1109 pt medicated for HA.  1213 Pt returned to room.  1220 pt continues to c/o nausea and HA with no relief from medication. Will notify physician.  1244 pt medicated for 6/10 HA.

## 2025-06-06 ENCOUNTER — PATIENT OUTREACH (OUTPATIENT)
Dept: PRIMARY CARE | Facility: CLINIC | Age: 50
End: 2025-06-06
Payer: COMMERCIAL

## 2025-06-06 LAB
AORTIC VALVE MEAN GRADIENT: 5 MMHG
AORTIC VALVE PEAK VELOCITY: 1.51 M/S
ATRIAL RATE: 86 BPM
AV PEAK GRADIENT: 9 MMHG
AVA (PEAK VEL): 3.01 CM2
EJECTION FRACTION APICAL 4 CHAMBER: 76.5
EJECTION FRACTION: 76 %
LEFT VENTRICLE INTERNAL DIMENSION DIASTOLE: 4.02 CM (ref 3.5–6)
LEFT VENTRICULAR OUTFLOW TRACT DIAMETER: 2.12 CM
LV EJECTION FRACTION BIPLANE: 76 %
MITRAL VALVE E/A RATIO: 1.24
P AXIS: 56 DEGREES
P OFFSET: 198 MS
P ONSET: 144 MS
PR INTERVAL: 164 MS
Q ONSET: 226 MS
QRS COUNT: 14 BEATS
QRS DURATION: 74 MS
QT INTERVAL: 398 MS
QTC CALCULATION(BAZETT): 476 MS
QTC FREDERICIA: 449 MS
R AXIS: 35 DEGREES
RIGHT VENTRICLE FREE WALL PEAK S': 17 CM/S
RIGHT VENTRICLE PEAK SYSTOLIC PRESSURE: 25 MMHG
T AXIS: 26 DEGREES
T OFFSET: 425 MS
TRICUSPID ANNULAR PLANE SYSTOLIC EXCURSION: 2.2 CM
VENTRICULAR RATE: 86 BPM

## 2025-06-06 NOTE — PROGRESS NOTES
Discharge Facility:   [facility]  Discharge Diagnosis:   SOB (shortness of breath)   Admission Date: 6/4/25  Discharge Date: 6/5/25     PCP Appointment Date: TBD- no contact   Specialist Appointment Date: 6/30/25  Hospital Encounter and Summary Linked: Yes    ED to Hosp-Admission (Discharged) with Lisa Lim MD; Ke Serra MD (06/04/2025)     Issues Requiring Follow-Up  Consider holter monitor after cardiology follow up    Unable to reach Patient x 2.  LVM requesting return call.

## 2025-06-09 LAB
ATRIAL RATE: 87 BPM
P AXIS: 46 DEGREES
P OFFSET: 194 MS
P ONSET: 136 MS
PR INTERVAL: 168 MS
Q ONSET: 220 MS
QRS COUNT: 14 BEATS
QRS DURATION: 82 MS
QT INTERVAL: 414 MS
QTC CALCULATION(BAZETT): 498 MS
QTC FREDERICIA: 468 MS
R AXIS: 42 DEGREES
T AXIS: 25 DEGREES
T OFFSET: 427 MS
VENTRICULAR RATE: 87 BPM

## 2025-06-13 DIAGNOSIS — J45.50 SEVERE PERSISTENT ASTHMA WITHOUT COMPLICATION (MULTI): Primary | ICD-10-CM

## 2025-06-20 ENCOUNTER — PATIENT OUTREACH (OUTPATIENT)
Dept: PRIMARY CARE | Facility: CLINIC | Age: 50
End: 2025-06-20
Payer: COMMERCIAL

## 2025-06-23 ENCOUNTER — APPOINTMENT (OUTPATIENT)
Dept: CARDIOLOGY | Facility: CLINIC | Age: 50
End: 2025-06-23
Payer: COMMERCIAL

## 2025-06-23 ENCOUNTER — TELEPHONE (OUTPATIENT)
Dept: PRIMARY CARE | Facility: CLINIC | Age: 50
End: 2025-06-23

## 2025-06-23 VITALS
DIASTOLIC BLOOD PRESSURE: 80 MMHG | HEART RATE: 80 BPM | HEIGHT: 60 IN | BODY MASS INDEX: 40.11 KG/M2 | WEIGHT: 204.3 LBS | SYSTOLIC BLOOD PRESSURE: 114 MMHG

## 2025-06-23 DIAGNOSIS — E78.2 MIXED HYPERLIPIDEMIA: ICD-10-CM

## 2025-06-23 DIAGNOSIS — Z78.9 NEVER SMOKED TOBACCO: ICD-10-CM

## 2025-06-23 DIAGNOSIS — R00.0 TACHYCARDIA, UNSPECIFIED: ICD-10-CM

## 2025-06-23 DIAGNOSIS — R73.9 HYPERGLYCEMIA: ICD-10-CM

## 2025-06-23 DIAGNOSIS — R06.02 SHORTNESS OF BREATH: ICD-10-CM

## 2025-06-23 DIAGNOSIS — I89.0 LYMPHEDEMA, NOT ELSEWHERE CLASSIFIED: ICD-10-CM

## 2025-06-23 DIAGNOSIS — R93.1 ABNORMAL FINDING ON ECHOCARDIOGRAM: ICD-10-CM

## 2025-06-23 PROCEDURE — 3008F BODY MASS INDEX DOCD: CPT | Performed by: INTERNAL MEDICINE

## 2025-06-23 PROCEDURE — 1036F TOBACCO NON-USER: CPT | Performed by: INTERNAL MEDICINE

## 2025-06-23 PROCEDURE — 99204 OFFICE O/P NEW MOD 45 MIN: CPT | Performed by: INTERNAL MEDICINE

## 2025-06-23 ASSESSMENT — ENCOUNTER SYMPTOMS
CARDIOVASCULAR NEGATIVE: 1
NEUROLOGICAL NEGATIVE: 1
CONSTITUTIONAL NEGATIVE: 1
RESPIRATORY NEGATIVE: 1

## 2025-06-23 NOTE — PATIENT INSTRUCTIONS
Continue same medications and treatments.   Patient educated on proper medication use.   Patient educated on risk factor modification.   Please bring any lab results from other providers / physicians to your next appointment.     Please bring all medicines, vitamins, and herbal supplements with you when you come to the office.     Prescriptions will not be filled unless you are compliant with your follow up appointments or have a follow up appointment scheduled as per instruction of your physician. Refills should be requested at the time of your visit.  2  week Zio patch  Refer to Lymphedema Clinic  Lipid 3 months   PRN follow up

## 2025-06-23 NOTE — PROGRESS NOTES
CARDIOLOGY OFFICE VISIT      CHIEF COMPLAINT  Chief Complaint   Patient presents with    New Patient Visit     New patient here to establish cardiac care due to recent Echoardiogram results, this was done at [facility] visit for SOB recently        HISTORY OF PRESENT ILLNESS  Rema Silver is a 50 y.o. year old female patient with no prior cardiac history.  She has a history of reactive airway disease for which she was on steroids for several years and also probable lymphedema.  She was recently seen at [facility] in June 2025 by Dr. Valencia when she had a sudden onset episode of shortness of breath associated with lightheadedness and near syncope with some clammy sweats but no chest pain.  She also had an emesis with the episode and it resolved after several minutes.  She has not had any further recurrences.  She has noticed sometimes episodes of increased heart rate but with no chest pain.  She admits to using energy drinks as well as high caffeine intake because of her stressful lifestyle.  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  She denies any chest pain with her day-to-day activities.  She does not smoke and she is not exposed to secondhand smoking.    Recent labs showed normal TSH with elevated lipids with total cholesterol of 211 and LDL is 123 and triglyceride is 125    Echocardiogram from 6/5/2025 shows normal LV function with an EF of 76%.  There are no gross valvular abnormalities.  EKG shows normal sinus rhythm with no acute ST or T wave changes.    ASSESSMENT AND PLAN  1.  Presyncope/tachycardia: Episode appears likely secondary to vasovagal reaction, accompanied by some vomiting as well as diaphoresis.  This may have been secondary to relative dehydration versus some nondescript arrhythmias.  It is concerning with use of energy drinks and I did have a lengthy discussion with her to stop using energy drinks as this cold precipitates some  dangerous tachyarrhythmias.  I will get a 14-day Zio patch for further monitoring.  I encouraged to increase her water intake.  As stated above, echocardiogram with structurally normal and patient has been reassured.  2.  Possible lymphedema: Will refer to the lymphedema clinic for further evaluation and she is advised to continue using the lymphedema pump.  3.  Asthma: This appears stable, defer to primary care for continued management.    Problem List Items Addressed This Visit       Hyperlipidemia    Hyperglycemia    BMI 39.0-39.9,adult    Shortness of breath    Abnormal echocardiogram    Never smoked tobacco           Past Medical History  Medical History[1]    Social History  Social History[2]    Family History   Family History[3]     Allergies:  RX Allergies[4]     Outpatient Medications:  Current Outpatient Medications   Medication Instructions    buPROPion XL (WELLBUTRIN XL) 150 mg, oral, Every morning, Do not crush, chew, or split.    cetirizine (ZyrTEC) 10 mg tablet TAKE 1 TABLET BY MOUTH ONCE DAILY AS NEEDED FOR ALLERGIES    cyanocobalamin (Vitamin B-12) 1,000 mcg tablet 1 tablet, Daily    diphenhydrAMINE (BENADRYL) 25 mg, oral, As needed    fluticasone (Flonase) 50 mcg/actuation nasal spray 1 spray, Each Nostril, Daily, Shake gently. Before first use, prime pump. After use, clean tip and replace cap.    fluticasone propion-salmeteroL (Advair Diskus) 500-50 mcg/dose diskus inhaler 1 puff, inhalation, 2 times daily RT, Rinse mouth with water after use to reduce aftertaste and incidence of candidiasis. Do not swallow.    ipratropium-albuteroL (Duo-Neb) 0.5-2.5 mg/3 mL nebulizer solution 3 mL, nebulization, 4 times daily PRN    magnesium oxide 500 mg magnesium tablet 1 tablet, Daily    montelukast (SINGULAIR) 10 mg, oral, Daily    pantoprazole (PROTONIX) 40 mg, oral, 2 times daily    Spiriva Respimat 2.5 mcg/actuation inhaler INHALE 2 SPRAY(S) BY MOUTH ONCE DAILY    venlafaxine XR (EFFEXOR XR) 75 mg, oral,  Daily, Do not crush or chew.        Recent Lab Results:  I have personally reviewed the below noted lab results:    CBC:   Lab Results   Component Value Date    WBC 17.7 (H) 06/05/2025    RBC 4.02 06/05/2025    HGB 12.2 06/05/2025    HCT 36.7 06/05/2025     06/05/2025        CMP:    Lab Results   Component Value Date     06/05/2025    K 4.1 06/05/2025     06/05/2025    CO2 24 06/05/2025    BUN 14 06/05/2025    CREATININE 0.55 06/05/2025    GLUCOSE 105 (H) 06/05/2025    CALCIUM 9.1 06/05/2025       Magnesium:    Lab Results   Component Value Date    MG 2.04 06/05/2025       Lipid Profile:    Lab Results   Component Value Date    TRIG 125 06/04/2025    HDL 63.3 06/04/2025    LDLCALC 123 (H) 06/04/2025       TSH:    Lab Results   Component Value Date    TSH 0.88 06/04/2025    TSH 1.13 02/19/2025       BNP:   Lab Results   Component Value Date    BNP 6 06/04/2025        PT/INR:    Lab Results   Component Value Date    PROTIME 11.0 07/23/2024    INR 1.0 07/23/2024       HgBA1c:    Lab Results   Component Value Date    HGBA1C 5.7 (H) 06/04/2025       BMP:  Lab Results   Component Value Date     06/05/2025     06/04/2025     09/23/2024    K 4.1 06/05/2025    K 3.5 06/04/2025    K 4.4 09/23/2024     06/05/2025     06/04/2025     (H) 09/23/2024    CO2 24 06/05/2025    CO2 20 (L) 06/04/2025    CO2 25 09/23/2024    BUN 14 06/05/2025    BUN 22 06/04/2025    BUN 12 09/23/2024    CREATININE 0.55 06/05/2025    CREATININE 0.75 06/04/2025    CREATININE 0.81 09/23/2024       CBC:  Lab Results   Component Value Date    WBC 17.7 (H) 06/05/2025    WBC 10.0 06/04/2025    WBC 9.0 09/23/2024    RBC 4.02 06/05/2025    RBC 4.61 06/04/2025    RBC 4.29 09/23/2024    HGB 12.2 06/05/2025    HGB 14.0 06/04/2025    HGB 13.2 09/23/2024    HCT 36.7 06/05/2025    HCT 40.6 06/04/2025    HCT 39.3 09/23/2024    MCV 91 06/05/2025    MCV 88 06/04/2025    MCV 92 09/23/2024    MCH 30.3 06/05/2025    MCH  30.4 06/04/2025    MCH 30.8 09/23/2024    MCHC 33.2 06/05/2025    MCHC 34.5 06/04/2025    MCHC 33.6 09/23/2024    RDW 13.9 06/05/2025    RDW 13.4 06/04/2025    RDW 13.4 09/23/2024     06/05/2025     06/04/2025     09/23/2024       Cardiac Enzymes:    Lab Results   Component Value Date    TROPHS <3 06/04/2025    TROPHS <3 06/04/2025    TROPHS <3 07/23/2024       Hepatic Function Panel:    Lab Results   Component Value Date    ALKPHOS 60 06/04/2025    ALT 31 06/04/2025    AST 24 06/04/2025    PROT 7.3 06/04/2025    BILITOT 0.5 06/04/2025         REVIEW OF SYSTEMS  Review of Systems   Constitutional: Negative.   Cardiovascular: Negative.    Respiratory: Negative.     Neurological: Negative.    All other systems reviewed and are negative.      VITALS  Vitals:    06/23/25 0817   BP: 114/80   Pulse: 80     Wt Readings from Last 4 Encounters:   06/23/25 92.7 kg (204 lb 4.8 oz)   06/04/25 91.6 kg (202 lb)   04/28/25 91.6 kg (202 lb)   04/08/25 112 kg (246 lb 6.4 oz)       PHYSICAL EXAM  Constitutional:       Appearance: Healthy appearance. Not in distress.   Eyes:      Conjunctiva/sclera: Conjunctivae normal.      Pupils: Pupils are equal, round, and reactive to light.   Neck:      Vascular: No JVR. JVD normal.   Pulmonary:      Effort: Pulmonary effort is normal.      Breath sounds: Normal breath sounds. No wheezing. No rhonchi. No rales.   Chest:      Chest wall: Not tender to palpatation.   Cardiovascular:      PMI at left midclavicular line. Normal rate. Regular rhythm. Normal S1. Normal S2.       Murmurs: There is no murmur.      No gallop.  No click. No rub.   Pulses:     Intact distal pulses.   Edema:     Peripheral edema absent.   Abdominal:      Tenderness: There is no abdominal tenderness.   Musculoskeletal: Normal range of motion.         General: No tenderness.      Cervical back: Normal range of motion. Skin:     General: Skin is warm and dry.   Neurological:      General: No focal deficit  present.      Mental Status: Alert and oriented to person, place and time.           I,Wes Rosales LPN   am scribing for, and in the presence of Dr. Roberth Christine MD  .    I, Dr. Robreth Christine MD  , personally performed the services described in the documentation as scribed by Wes Rosales LPN   in my presence, and confirm it is both accurate and complete.      Dr. Roberth Christine MD  Thank you for allowing me to participate in the care of this patient. Please do not hesitate to contact me with any further questions or concerns.         [1]   Past Medical History:  Diagnosis Date    Acute non-ST elevation myocardial infarction (NSTEMI) (Multi) 06/04/2018    Asthma     Contact with and (suspected) exposure to covid-19 08/13/2023    Cough, unspecified 08/24/2022    Deep vein thrombosis (Multi) 2020    Disease due to severe acute respiratory syndrome coronavirus 2 (SARS-CoV-2) 09/07/2024    Diverticulitis     Edema 09/07/2024    Edema of both lower extremities 09/07/2024    Essential (primary) hypertension 09/10/2022    Migraines     Personal history of COVID-19 12/21/2022    Personal history of other diseases of the respiratory system     History of acute bronchitis    Plantar fasciitis, left 03/06/2018    PONV (postoperative nausea and vomiting)     Pulmonary embolism 2021    Sprain of left foot 04/10/2018    Sprain of medial collateral ligament of knee 04/01/2020   [2]   Social History  Tobacco Use    Smoking status: Never    Smokeless tobacco: Never   Vaping Use    Vaping status: Never Used   Substance Use Topics    Alcohol use: Yes     Comment: 1x a month    Drug use: Never   [3]   Family History  Problem Relation Name Age of Onset    Depression Mother Nusrat     HOLLY disease Maternal Grandfather      Newman's esophagus Maternal Grandfather      Asthma Other Aunt    [4]   Allergies  Allergen Reactions    Fentanyl Respiratory depression     Happened alone, unsure if this was the reaction    Opioids -  Morphine Analogues Nausea/vomiting

## 2025-06-30 ENCOUNTER — APPOINTMENT (OUTPATIENT)
Dept: CARDIOLOGY | Facility: CLINIC | Age: 50
End: 2025-06-30
Payer: COMMERCIAL

## 2025-06-30 DIAGNOSIS — F32.A DEPRESSION, UNSPECIFIED DEPRESSION TYPE: ICD-10-CM

## 2025-07-01 RX ORDER — BUPROPION HYDROCHLORIDE 150 MG/1
150 TABLET ORAL
Qty: 90 TABLET | Refills: 1 | Status: SHIPPED | OUTPATIENT
Start: 2025-07-01

## 2025-07-15 ENCOUNTER — TELEPHONE (OUTPATIENT)
Dept: PRIMARY CARE | Facility: CLINIC | Age: 50
End: 2025-07-15
Payer: COMMERCIAL

## 2025-07-15 NOTE — TELEPHONE ENCOUNTER
"Pt left message in response to [employer] messages \"I received the voice mail.  I am not feeling too terrible, I felt worse yesterday, so I really don't want to go to the ER since they did not find anything wrong with me anyway.  I just would like to know if there is any maintenance medication that could be taken to prevent this from happening?\"  "

## 2025-07-16 ENCOUNTER — OFFICE VISIT (OUTPATIENT)
Dept: PRIMARY CARE | Facility: CLINIC | Age: 50
End: 2025-07-16
Payer: COMMERCIAL

## 2025-07-16 VITALS
BODY MASS INDEX: 39.58 KG/M2 | SYSTOLIC BLOOD PRESSURE: 120 MMHG | TEMPERATURE: 97 F | HEIGHT: 60 IN | WEIGHT: 201.6 LBS | HEART RATE: 88 BPM | DIASTOLIC BLOOD PRESSURE: 82 MMHG | OXYGEN SATURATION: 98 %

## 2025-07-16 DIAGNOSIS — R79.0 LOW SERUM PHOSPHORUS FOR AGE: Primary | ICD-10-CM

## 2025-07-16 DIAGNOSIS — R55 NEAR SYNCOPE: ICD-10-CM

## 2025-07-16 DIAGNOSIS — D72.829 LEUKOCYTOSIS, UNSPECIFIED TYPE: ICD-10-CM

## 2025-07-16 DIAGNOSIS — R03.0 ELEVATED BLOOD PRESSURE READING: ICD-10-CM

## 2025-07-16 DIAGNOSIS — R11.10 VOMITING, UNSPECIFIED VOMITING TYPE, UNSPECIFIED WHETHER NAUSEA PRESENT: ICD-10-CM

## 2025-07-16 PROCEDURE — 3008F BODY MASS INDEX DOCD: CPT | Performed by: INTERNAL MEDICINE

## 2025-07-16 PROCEDURE — 99214 OFFICE O/P EST MOD 30 MIN: CPT | Performed by: INTERNAL MEDICINE

## 2025-07-16 PROCEDURE — 1036F TOBACCO NON-USER: CPT | Performed by: INTERNAL MEDICINE

## 2025-07-16 RX ORDER — HYDROCHLOROTHIAZIDE 25 MG/1
25 TABLET ORAL DAILY
Qty: 30 TABLET | Refills: 1 | Status: SHIPPED | OUTPATIENT
Start: 2025-07-16 | End: 2026-01-12

## 2025-07-16 ASSESSMENT — PAIN SCALES - GENERAL: PAINLEVEL_OUTOF10: 0-NO PAIN

## 2025-07-16 NOTE — PROGRESS NOTES
"Chief Complaint   Patient presents with    health concerns     Pt here to discuss concerns with blood pressure, states \"It has been spiking up high for no reason.  I have never had issues with my blood pressure.\"       50 y.o. for AWV  Last visit 12/2024  Lymphedema legs and arms [facility]    She reports she was at work when suddenly she felt a burst of heat.  It seemed like the room was tilting and her vision was funny.  She had nausea and palpitations.  She was with the client and had been speaking to the client for about 30 minutes and told her she needed to leave.  She went to the car and called her daughter.  She is only able to drive a brief.  And her daughter brought her to [facility].  She did not feel like it was her breathing but she did receive Solu-Medrol and nebulizer treatments.  Troponin levels were negative.  She did follow-up with cardiologist.  He ordered a Zio patch which she was feeling better so she canceled that.    She is feeling okay until just a few days ago.  She has similar symptoms but not as severe.  She has a little bit of nausea.  No vomiting.  She stayed in bed.  Her blood pressure readings in the last few days have been elevated.  Today first thing this morning 128/88.  About 5 minutes later 145/88.  She had an energy drink.  About half hour later her systolic was 160.  She is cut back on energy drinks.  She is drinking about up to 3 a day and is now drinking 1 a day.  She states that has about 200 mg of caffeine in this drink.  She does work a lot of hours.  She takes 1 week of vacation a year and is planning to take this next week.  They are driving to the [employer].    She feels like being lymphedema is giving her more problems.    Problem List[1]      Blood pressure 120/82, pulse 88, temperature 36.1 °C (97 °F), height (!) 1.524 m (5'), weight 91.4 kg (201 lb 9.6 oz), SpO2 98%.  Body mass index is 39.37 kg/m².  Patient brought in blood pressure monitor.  " "Reviewed readings.   Rechecked blood pressure 122/88 thigh cuff left arm  Physical Examination  General: NAD. Alert.   HEENT: Normocephalic atraumatic.    Lymph nodes: No cervical or clavicular adenopathy  Cardiovascular: Regular rate rhythm S1-S2 normal no murmur. No carotid bruit.   Lungs: Clear to Auscultation without wheezing, rales, or rhonchi, Breath sounds symmetric. No use of accessory muscles  Abdomen:  Normoactive bowel sounds, soft, non-tender.  No bruit   extremities: nonpitting edema. Cuffing arms.   Neuro: no facial asymmetry.  Speech intact      Lab Results   Component Value Date    HGBA1C 5.7 (H) 06/04/2025     Lab Results   Component Value Date    GLUCOSE 105 (H) 06/05/2025    CALCIUM 9.1 06/05/2025     06/05/2025    K 4.1 06/05/2025    CO2 24 06/05/2025     06/05/2025    BUN 14 06/05/2025    CREATININE 0.55 06/05/2025     Lab Results   Component Value Date    ALT 31 06/04/2025    AST 24 06/04/2025    ALKPHOS 60 06/04/2025    BILITOT 0.5 06/04/2025     Lab Results   Component Value Date    WBC 17.7 (H) 06/05/2025    HGB 12.2 06/05/2025    HCT 36.7 06/05/2025    MCV 91 06/05/2025     06/05/2025     Lab Results   Component Value Date    CHOL 211 (H) 06/04/2025    CHOL 221 (H) 09/23/2024     Lab Results   Component Value Date    HDL 63.3 06/04/2025    HDL 52.0 09/23/2024     Lab Results   Component Value Date    LDLCALC 123 (H) 06/04/2025    LDLCALC 126 (H) 09/23/2024     Lab Results   Component Value Date    TRIG 125 06/04/2025    TRIG 213 (H) 09/23/2024     No components found for: \"CHOLHDL\"      ASSESSMENT/PLAN  1. Low serum phosphorus for age (Primary)  Recheck level  - Phosphorus; Future  - Phosphorus    2. Leukocytosis, unspecified type  Normal when she first went to the emergency room.  The next level was elevated.  On clarification she did receive Solu-Medrol which would explain the leukocytosis but recommend repeat  - CBC and Auto Differential; Future  - CBC and Auto " Differential    3. Vomiting, unspecified vomiting type, unspecified whether nausea present    - CT head wo IV contrast; Future  - Basic metabolic panel; Future  - Basic metabolic panel    4. Near syncope  Vomiting near syncopal episode recommend CT of brain.  Also recommend complete Zio patch as recommended by cardiologist.  She will contact his office.  Did review the echo report.  She has hyperdynamic EF.  Mild diastolic dysfunction.  - CT head wo IV contrast; Future    5. Elevated blood pressure reading  Given home readings will try hydrochlorothiazide.  Obtain lab work in 1 week after she returns from her vacation.  Discussed potential pros and cons of medication.  Will obtain metanephrine as screening for possible pheochromocytoma.  Recommend follow-up here in 2 weeks.  We discussed that it would not be advisable to go on vacation especially with the symptoms.  She understands this but states that she will not miss her vacation.  - Metanephrines Plasma; Future  - Metanephrines Plasma  - Basic metabolic panel; Future  - Basic metabolic panel  - hydroCHLOROthiazide (HYDRODiuril) 25 mg tablet; Take 1 tablet (25 mg) by mouth once daily.  Dispense: 30 tablet; Refill: 1      Living Will: has a living will.     Hepatitis C screen (18-79) done 09/05/2020 [facility]  HIV screen(18-64, once) done 09/05/2020 [facility]  Mammogram 09/2024-future order  Pap 2020 Dr Keaton Chun 12/2024 normal.   Colon cancer screening 45 and older: scope 09/2021 due 2028  lymphedema-this was diagnosed [facility].   Dr Christian JUSTICE recommended by Dr Vyas    Medications Ordered Prior to Encounter[2]         [1]   Patient Active Problem List  Diagnosis    Abdominal pain, epigastric    Abnormality, skin    Arthritis    Tendonitis    Allergic rhinitis    Ankle sprain    Asthma    Sleep apnea    Chronic constipation    Diarrhea    Change in bowel habits    Calcaneal spur of left foot    Calcaneal spur    Dyspepsia    Diverticulitis of colon    Chronic  fatigue syndrome    Insomnia    Hypoglycemia    Hyperlipidemia    Hyperglycemia    Hiatal hernia    GERD (gastroesophageal reflux disease)    Fibromyalgia    Muscle tension dysphonia    Vocal cord disease    Lesion of neck    Irritable larynx    Severe persistent asthma without complication (Multi)    Pain of left heel    Vasovagal symptom    Venous insufficiency (chronic) (peripheral)    Vitamin D deficiency    Vocal cord dysfunction    Atypical chest pain    Lower extremity edema    BMI 39.0-39.9,adult    Feeling weak    Heel spur, right    Shortness of breath    Peripheral eosinophilia    Diverticulitis    Localized, primary osteoarthritis    Abnormal uterine bleeding unrelated to menstrual cycle    Ovarian retention cyst    Vasomotor symptoms due to menopause    Female pelvic pain    SOB (shortness of breath)    Abnormal finding on echocardiogram    Never smoked tobacco   [2]   Current Outpatient Medications on File Prior to Visit   Medication Sig Dispense Refill    buPROPion XL (Wellbutrin XL) 150 mg 24 hr tablet TAKE 1 TABLET BY MOUTH ONCE DAILY IN THE MORNING DO  NOT  CRUSH,  CHEW,  OR  SPLIT. 90 tablet 1    cetirizine (ZyrTEC) 10 mg tablet TAKE 1 TABLET BY MOUTH ONCE DAILY AS NEEDED FOR ALLERGIES 30 tablet 11    cyanocobalamin (Vitamin B-12) 1,000 mcg tablet Take 1 tablet (1,000 mcg) by mouth once daily.      diphenhydrAMINE (BENADryl) 25 mg capsule Take 1 capsule (25 mg) by mouth if needed for other (migraine) for up to 5 days. 5 capsule 0    fluticasone (Flonase) 50 mcg/actuation nasal spray Administer 1 spray into each nostril once daily. Shake gently. Before first use, prime pump. After use, clean tip and replace cap. 16 g 11    fluticasone propion-salmeteroL (Advair Diskus) 500-50 mcg/dose diskus inhaler Inhale 1 puff 2 times a day. Rinse mouth with water after use to reduce aftertaste and incidence of candidiasis. Do not swallow. 60 each 11    ipratropium-albuteroL (Duo-Neb) 0.5-2.5 mg/3 mL nebulizer  solution Take 3 mL by nebulization 4 times a day as needed for wheezing. 720 mL 3    magnesium oxide 500 mg magnesium tablet Take 1 tablet (500 mg) by mouth once daily.      montelukast (Singulair) 10 mg tablet Take 1 tablet by mouth once daily 90 tablet 3    pantoprazole (ProtoNix) 40 mg EC tablet Take 1 tablet by mouth twice daily (Patient taking differently: Take 2 tablets (80 mg) by mouth once daily in the morning. Take before meals.) 60 tablet 3    Spiriva Respimat 2.5 mcg/actuation inhaler INHALE 2 SPRAY(S) BY MOUTH ONCE DAILY 4 g 6    venlafaxine XR (Effexor XR) 75 mg 24 hr capsule Take 1 capsule (75 mg) by mouth once daily. Do not crush or chew. 30 capsule 11     No current facility-administered medications on file prior to visit.

## 2025-07-17 ENCOUNTER — HOSPITAL ENCOUNTER (OUTPATIENT)
Dept: RADIOLOGY | Facility: CLINIC | Age: 50
Discharge: HOME | End: 2025-07-17
Payer: COMMERCIAL

## 2025-07-17 ENCOUNTER — TELEPHONE (OUTPATIENT)
Dept: CARDIOLOGY | Facility: CLINIC | Age: 50
End: 2025-07-17

## 2025-07-17 DIAGNOSIS — R11.10 VOMITING, UNSPECIFIED VOMITING TYPE, UNSPECIFIED WHETHER NAUSEA PRESENT: ICD-10-CM

## 2025-07-17 DIAGNOSIS — R00.0 SINUS TACHYCARDIA: ICD-10-CM

## 2025-07-17 DIAGNOSIS — R55 NEAR SYNCOPE: ICD-10-CM

## 2025-07-17 PROCEDURE — 70450 CT HEAD/BRAIN W/O DYE: CPT

## 2025-07-17 NOTE — TELEPHONE ENCOUNTER
Per Dr Christine order after message sent by PCP Dr Nicole asking if he would see patient and possibly order a 2 week zio patch. Order sent to Dr Christine to sign.

## 2025-07-18 ENCOUNTER — TELEPHONE (OUTPATIENT)
Dept: PRIMARY CARE | Facility: CLINIC | Age: 50
End: 2025-07-18
Payer: COMMERCIAL

## 2025-07-31 ENCOUNTER — APPOINTMENT (OUTPATIENT)
Dept: PRIMARY CARE | Facility: CLINIC | Age: 50
End: 2025-07-31
Payer: COMMERCIAL

## 2025-07-31 DIAGNOSIS — K21.9 GASTROESOPHAGEAL REFLUX DISEASE WITHOUT ESOPHAGITIS: ICD-10-CM

## 2025-08-01 ENCOUNTER — APPOINTMENT (OUTPATIENT)
Dept: CARDIOLOGY | Facility: CLINIC | Age: 50
End: 2025-08-01
Payer: COMMERCIAL

## 2025-08-01 DIAGNOSIS — R73.9 HYPERGLYCEMIA: ICD-10-CM

## 2025-08-01 DIAGNOSIS — E55.9 VITAMIN D DEFICIENCY: ICD-10-CM

## 2025-08-01 DIAGNOSIS — E78.5 HYPERLIPIDEMIA, UNSPECIFIED HYPERLIPIDEMIA TYPE: ICD-10-CM

## 2025-08-04 PROBLEM — R06.02 SOB (SHORTNESS OF BREATH): Status: RESOLVED | Noted: 2025-06-04 | Resolved: 2025-08-04

## 2025-08-04 RX ORDER — PANTOPRAZOLE SODIUM 40 MG/1
40 TABLET, DELAYED RELEASE ORAL 2 TIMES DAILY
Qty: 60 TABLET | Refills: 6 | Status: SHIPPED | OUTPATIENT
Start: 2025-08-04

## 2025-08-05 ENCOUNTER — APPOINTMENT (OUTPATIENT)
Dept: PRIMARY CARE | Facility: CLINIC | Age: 50
End: 2025-08-05
Payer: COMMERCIAL

## 2025-08-05 VITALS
SYSTOLIC BLOOD PRESSURE: 122 MMHG | OXYGEN SATURATION: 98 % | HEART RATE: 90 BPM | DIASTOLIC BLOOD PRESSURE: 78 MMHG | BODY MASS INDEX: 40.52 KG/M2 | HEIGHT: 60 IN | TEMPERATURE: 97.9 F | WEIGHT: 206.4 LBS

## 2025-08-05 DIAGNOSIS — G56.01 CARPAL TUNNEL SYNDROME OF RIGHT WRIST: ICD-10-CM

## 2025-08-05 DIAGNOSIS — Z23 NEED FOR PROPHYLACTIC VACCINATION AGAINST STREPTOCOCCUS PNEUMONIAE (PNEUMOCOCCUS): ICD-10-CM

## 2025-08-05 DIAGNOSIS — I10 HYPERTENSION, UNSPECIFIED TYPE: Primary | ICD-10-CM

## 2025-08-05 PROCEDURE — 3078F DIAST BP <80 MM HG: CPT | Performed by: INTERNAL MEDICINE

## 2025-08-05 PROCEDURE — 1036F TOBACCO NON-USER: CPT | Performed by: INTERNAL MEDICINE

## 2025-08-05 PROCEDURE — 3008F BODY MASS INDEX DOCD: CPT | Performed by: INTERNAL MEDICINE

## 2025-08-05 PROCEDURE — 90471 IMMUNIZATION ADMIN: CPT | Performed by: INTERNAL MEDICINE

## 2025-08-05 PROCEDURE — 99214 OFFICE O/P EST MOD 30 MIN: CPT | Performed by: INTERNAL MEDICINE

## 2025-08-05 PROCEDURE — 90677 PCV20 VACCINE IM: CPT | Performed by: INTERNAL MEDICINE

## 2025-08-05 PROCEDURE — 3074F SYST BP LT 130 MM HG: CPT | Performed by: INTERNAL MEDICINE

## 2025-08-05 ASSESSMENT — PAIN SCALES - GENERAL: PAINLEVEL_OUTOF10: 5

## 2025-08-05 NOTE — PROGRESS NOTES
50 y.o. for 2 week follow up    Last visit 07/2025  Lymphedema legs and arms dx [facility]  Last visit reported episode of burst of heat, room tilting, vision funny. Left work. Nausea, and palpitations. Called daughter. Pt could not drive and daughter brought her to [facility]. Zio patch ordered but pt canceled because felt better. Few days prior to last visit. Nausea. Bp elevated.     08/2025 obtaining swim spa  Since last visit-no palpitations.  Very active. Feeling much better.  Right numbness right hand and forearm. Also neck pain  Neck pain-chronic neck pain does not radiate down arm  Had EMG x2 age 14 because was dropping things.       Problem List[1]      Blood pressure 122/78, pulse 90, temperature 36.6 °C (97.9 °F), height (!) 1.524 m (5'), weight 93.6 kg (206 lb 6.4 oz), SpO2 98%.  Body mass index is 40.31 kg/m².    Physical Examination  General: NAD. Alert.   HEENT: Normocephalic atraumatic.    Lymph nodes: No cervical or clavicular adenopathy  Cardiovascular: Regular rate rhythm S1-S2 normal no murmur. No carotid bruit.   Lungs: Clear to Auscultation without wheezing, rales, or rhonchi, Breath sounds symmetric. No use of accessory muscles  Abdomen:  Normoactive bowel sounds,  limited  extremities: nonpitting edema. Cuffing arms.   Neuro: no facial asymmetry.  Speech intact      Lab Results   Component Value Date    HGBA1C 5.7 (H) 06/04/2025     Lab Results   Component Value Date    GLUCOSE 105 (H) 06/05/2025    CALCIUM 9.1 06/05/2025     06/05/2025    K 4.1 06/05/2025    CO2 24 06/05/2025     06/05/2025    BUN 14 06/05/2025    CREATININE 0.55 06/05/2025     Lab Results   Component Value Date    ALT 31 06/04/2025    AST 24 06/04/2025    ALKPHOS 60 06/04/2025    BILITOT 0.5 06/04/2025     Lab Results   Component Value Date    WBC 17.7 (H) 06/05/2025    HGB 12.2 06/05/2025    HCT 36.7 06/05/2025    MCV 91 06/05/2025     06/05/2025     Lab Results   Component Value Date     "CHOL 211 (H) 06/04/2025    CHOL 221 (H) 09/23/2024     Lab Results   Component Value Date    HDL 63.3 06/04/2025    HDL 52.0 09/23/2024     Lab Results   Component Value Date    LDLCALC 123 (H) 06/04/2025    LDLCALC 126 (H) 09/23/2024     Lab Results   Component Value Date    TRIG 125 06/04/2025    TRIG 213 (H) 09/23/2024     No components found for: \"CHOLHDL\"      ASSESSMENT/PLAN  1. Hypertension, unspecified type (Primary)  Bp within goal. Follow low sodium diet.     2. Need for prophylactic vaccination against Streptococcus pneumoniae (pneumococcus)   Pneumococcal conjugate vaccine, 20-valent (PREVNAR 20)    3. Carpal tunnel syndrome of right wrist  - Wrist splint  Declines EMG. Would like to try splint first.     Other advised to check with GI if should have hepatitis vaccines  Living Will: has a living will.     Hepatitis C screen (18-79) done 09/05/2020 [facility]  HIV screen(18-64, once) done 09/05/2020 [facility]  Mammogram 09/2024-future order  Pap 2020 Dr Keaotn Chun 12/2024 normal.   Colon cancer screening 45 and older: scope 09/2021 due 2028  lymphedema-this was diagnosed [facility].   Dr Christian JUSTICE recommended by Dr Vyas    Medications Ordered Prior to Encounter[2]         [1]   Patient Active Problem List  Diagnosis    Abdominal pain, epigastric    Abnormality, skin    Arthritis    Tendonitis    Allergic rhinitis    Ankle sprain    Asthma    Sleep apnea    Chronic constipation    Diarrhea    Change in bowel habits    Calcaneal spur of left foot    Calcaneal spur    Dyspepsia    Diverticulitis of colon    Chronic fatigue syndrome    Insomnia    Hypoglycemia    Hyperlipidemia    Hyperglycemia    Hiatal hernia    GERD (gastroesophageal reflux disease)    Fibromyalgia    Muscle tension dysphonia    Vocal cord disease    Lesion of neck    Irritable larynx    Severe persistent asthma without complication (Multi)    Pain of left heel    Vasovagal symptom    Venous insufficiency (chronic) (peripheral)    Vitamin D " deficiency    Vocal cord dysfunction    Atypical chest pain    Lower extremity edema    BMI 39.0-39.9,adult    Feeling weak    Heel spur, right    Shortness of breath    Peripheral eosinophilia    Diverticulitis    Localized, primary osteoarthritis    Abnormal uterine bleeding unrelated to menstrual cycle    Ovarian retention cyst    Vasomotor symptoms due to menopause    Female pelvic pain    Abnormal finding on echocardiogram    Never smoked tobacco   [2]   Current Outpatient Medications on File Prior to Visit   Medication Sig Dispense Refill    buPROPion XL (Wellbutrin XL) 150 mg 24 hr tablet TAKE 1 TABLET BY MOUTH ONCE DAILY IN THE MORNING DO  NOT  CRUSH,  CHEW,  OR  SPLIT. 90 tablet 1    cetirizine (ZyrTEC) 10 mg tablet TAKE 1 TABLET BY MOUTH ONCE DAILY AS NEEDED FOR ALLERGIES 30 tablet 11    cyanocobalamin (Vitamin B-12) 1,000 mcg tablet Take 1 tablet (1,000 mcg) by mouth once daily.      diphenhydrAMINE (BENADryl) 25 mg capsule Take 1 capsule (25 mg) by mouth if needed for other (migraine) for up to 5 days. 5 capsule 0    fluticasone (Flonase) 50 mcg/actuation nasal spray Administer 1 spray into each nostril once daily. Shake gently. Before first use, prime pump. After use, clean tip and replace cap. 16 g 11    fluticasone propion-salmeteroL (Advair Diskus) 500-50 mcg/dose diskus inhaler Inhale 1 puff 2 times a day. Rinse mouth with water after use to reduce aftertaste and incidence of candidiasis. Do not swallow. 60 each 11    hydroCHLOROthiazide (HYDRODiuril) 25 mg tablet Take 1 tablet (25 mg) by mouth once daily. 30 tablet 1    ipratropium-albuteroL (Duo-Neb) 0.5-2.5 mg/3 mL nebulizer solution Take 3 mL by nebulization 4 times a day as needed for wheezing. 720 mL 3    magnesium oxide 500 mg magnesium tablet Take 1 tablet (500 mg) by mouth once daily.      montelukast (Singulair) 10 mg tablet Take 1 tablet by mouth once daily 90 tablet 3    pantoprazole (ProtoNix) 40 mg EC tablet Take 1 tablet by mouth  twice daily 60 tablet 6    Spiriva Respimat 2.5 mcg/actuation inhaler INHALE 2 SPRAY(S) BY MOUTH ONCE DAILY 4 g 6    venlafaxine XR (Effexor XR) 75 mg 24 hr capsule Take 1 capsule (75 mg) by mouth once daily. Do not crush or chew. 30 capsule 11    [DISCONTINUED] pantoprazole (ProtoNix) 40 mg EC tablet Take 1 tablet by mouth twice daily (Patient taking differently: Take 2 tablets (80 mg) by mouth once daily in the morning. Take before meals.) 60 tablet 3     No current facility-administered medications on file prior to visit.

## 2025-08-13 DIAGNOSIS — J45.50 SEVERE PERSISTENT ASTHMA WITHOUT COMPLICATION (MULTI): ICD-10-CM

## 2025-08-13 RX ORDER — TIOTROPIUM BROMIDE INHALATION SPRAY 3.12 UG/1
SPRAY, METERED RESPIRATORY (INHALATION)
Qty: 4 G | Refills: 0 | Status: SHIPPED | OUTPATIENT
Start: 2025-08-13

## 2025-08-16 ENCOUNTER — APPOINTMENT (OUTPATIENT)
Dept: RADIOLOGY | Facility: HOSPITAL | Age: 50
End: 2025-08-16
Payer: COMMERCIAL

## 2025-08-16 ENCOUNTER — HOSPITAL ENCOUNTER (EMERGENCY)
Facility: HOSPITAL | Age: 50
Discharge: HOME | End: 2025-08-16
Attending: STUDENT IN AN ORGANIZED HEALTH CARE EDUCATION/TRAINING PROGRAM
Payer: COMMERCIAL

## 2025-08-16 ENCOUNTER — APPOINTMENT (OUTPATIENT)
Dept: CARDIOLOGY | Facility: HOSPITAL | Age: 50
End: 2025-08-16
Payer: COMMERCIAL

## 2025-08-16 VITALS
OXYGEN SATURATION: 94 % | SYSTOLIC BLOOD PRESSURE: 155 MMHG | BODY MASS INDEX: 38.29 KG/M2 | RESPIRATION RATE: 33 BRPM | TEMPERATURE: 97.5 F | HEIGHT: 61 IN | HEART RATE: 121 BPM | DIASTOLIC BLOOD PRESSURE: 73 MMHG | WEIGHT: 202.82 LBS

## 2025-08-16 DIAGNOSIS — R10.84 GENERALIZED ABDOMINAL PAIN: Primary | ICD-10-CM

## 2025-08-16 DIAGNOSIS — R11.2 NAUSEA AND VOMITING, UNSPECIFIED VOMITING TYPE: ICD-10-CM

## 2025-08-16 LAB
ALBUMIN SERPL BCP-MCNC: 4.9 G/DL (ref 3.4–5)
ALP SERPL-CCNC: 62 U/L (ref 33–110)
ALT SERPL W P-5'-P-CCNC: 34 U/L (ref 7–45)
ANION GAP SERPL CALC-SCNC: 19 MMOL/L (ref 10–20)
AST SERPL W P-5'-P-CCNC: 27 U/L (ref 9–39)
BASOPHILS # BLD AUTO: 0.02 X10*3/UL (ref 0–0.1)
BASOPHILS NFR BLD AUTO: 0.2 %
BILIRUB SERPL-MCNC: 0.5 MG/DL (ref 0–1.2)
BNP SERPL-MCNC: 3 PG/ML (ref 0–99)
BUN SERPL-MCNC: 17 MG/DL (ref 6–23)
CALCIUM SERPL-MCNC: 10.3 MG/DL (ref 8.6–10.3)
CARDIAC TROPONIN I PNL SERPL HS: 3 NG/L (ref 0–13)
CARDIAC TROPONIN I PNL SERPL HS: 3 NG/L (ref 0–13)
CHLORIDE SERPL-SCNC: 100 MMOL/L (ref 98–107)
CO2 SERPL-SCNC: 20 MMOL/L (ref 21–32)
CREAT SERPL-MCNC: 0.81 MG/DL (ref 0.5–1.05)
EGFRCR SERPLBLD CKD-EPI 2021: 87 ML/MIN/1.73M*2
EOSINOPHIL # BLD AUTO: 0.01 X10*3/UL (ref 0–0.7)
EOSINOPHIL NFR BLD AUTO: 0.1 %
ERYTHROCYTE [DISTWIDTH] IN BLOOD BY AUTOMATED COUNT: 13.9 % (ref 11.5–14.5)
GLUCOSE BLD MANUAL STRIP-MCNC: 140 MG/DL (ref 74–99)
GLUCOSE SERPL-MCNC: 121 MG/DL (ref 74–99)
HCT VFR BLD AUTO: 47.2 % (ref 36–46)
HGB BLD-MCNC: 15.4 G/DL (ref 12–16)
IMM GRANULOCYTES # BLD AUTO: 0.02 X10*3/UL (ref 0–0.7)
IMM GRANULOCYTES NFR BLD AUTO: 0.2 % (ref 0–0.9)
LIPASE SERPL-CCNC: 34 U/L (ref 9–82)
LYMPHOCYTES # BLD AUTO: 2.53 X10*3/UL (ref 1.2–4.8)
LYMPHOCYTES NFR BLD AUTO: 31 %
MAGNESIUM SERPL-MCNC: 2.02 MG/DL (ref 1.6–2.4)
MCH RBC QN AUTO: 30.6 PG (ref 26–34)
MCHC RBC AUTO-ENTMCNC: 32.6 G/DL (ref 32–36)
MCV RBC AUTO: 94 FL (ref 80–100)
MONOCYTES # BLD AUTO: 0.22 X10*3/UL (ref 0.1–1)
MONOCYTES NFR BLD AUTO: 2.7 %
NEUTROPHILS # BLD AUTO: 5.37 X10*3/UL (ref 1.2–7.7)
NEUTROPHILS NFR BLD AUTO: 65.8 %
NRBC BLD-RTO: 0 /100 WBCS (ref 0–0)
PLATELET # BLD AUTO: 298 X10*3/UL (ref 150–450)
POTASSIUM SERPL-SCNC: 2.8 MMOL/L (ref 3.5–5.3)
PROT SERPL-MCNC: 7.7 G/DL (ref 6.4–8.2)
RBC # BLD AUTO: 5.03 X10*6/UL (ref 4–5.2)
SODIUM SERPL-SCNC: 136 MMOL/L (ref 136–145)
WBC # BLD AUTO: 8.2 X10*3/UL (ref 4.4–11.3)

## 2025-08-16 PROCEDURE — 96365 THER/PROPH/DIAG IV INF INIT: CPT | Mod: 59

## 2025-08-16 PROCEDURE — 84484 ASSAY OF TROPONIN QUANT: CPT

## 2025-08-16 PROCEDURE — 99285 EMERGENCY DEPT VISIT HI MDM: CPT | Mod: 25 | Performed by: STUDENT IN AN ORGANIZED HEALTH CARE EDUCATION/TRAINING PROGRAM

## 2025-08-16 PROCEDURE — 96366 THER/PROPH/DIAG IV INF ADDON: CPT

## 2025-08-16 PROCEDURE — 2500000004 HC RX 250 GENERAL PHARMACY W/ HCPCS (ALT 636 FOR OP/ED)

## 2025-08-16 PROCEDURE — 83735 ASSAY OF MAGNESIUM: CPT | Performed by: STUDENT IN AN ORGANIZED HEALTH CARE EDUCATION/TRAINING PROGRAM

## 2025-08-16 PROCEDURE — 96361 HYDRATE IV INFUSION ADD-ON: CPT

## 2025-08-16 PROCEDURE — 83690 ASSAY OF LIPASE: CPT | Performed by: STUDENT IN AN ORGANIZED HEALTH CARE EDUCATION/TRAINING PROGRAM

## 2025-08-16 PROCEDURE — 71045 X-RAY EXAM CHEST 1 VIEW: CPT | Performed by: RADIOLOGY

## 2025-08-16 PROCEDURE — 94640 AIRWAY INHALATION TREATMENT: CPT

## 2025-08-16 PROCEDURE — 82947 ASSAY GLUCOSE BLOOD QUANT: CPT

## 2025-08-16 PROCEDURE — 96375 TX/PRO/DX INJ NEW DRUG ADDON: CPT

## 2025-08-16 PROCEDURE — 93005 ELECTROCARDIOGRAM TRACING: CPT

## 2025-08-16 PROCEDURE — 2550000001 HC RX 255 CONTRASTS: Performed by: STUDENT IN AN ORGANIZED HEALTH CARE EDUCATION/TRAINING PROGRAM

## 2025-08-16 PROCEDURE — 85025 COMPLETE CBC W/AUTO DIFF WBC: CPT | Performed by: STUDENT IN AN ORGANIZED HEALTH CARE EDUCATION/TRAINING PROGRAM

## 2025-08-16 PROCEDURE — 2500000002 HC RX 250 W HCPCS SELF ADMINISTERED DRUGS (ALT 637 FOR MEDICARE OP, ALT 636 FOR OP/ED): Performed by: STUDENT IN AN ORGANIZED HEALTH CARE EDUCATION/TRAINING PROGRAM

## 2025-08-16 PROCEDURE — 99285 EMERGENCY DEPT VISIT HI MDM: CPT | Performed by: STUDENT IN AN ORGANIZED HEALTH CARE EDUCATION/TRAINING PROGRAM

## 2025-08-16 PROCEDURE — 83880 ASSAY OF NATRIURETIC PEPTIDE: CPT

## 2025-08-16 PROCEDURE — 80053 COMPREHEN METABOLIC PANEL: CPT | Performed by: STUDENT IN AN ORGANIZED HEALTH CARE EDUCATION/TRAINING PROGRAM

## 2025-08-16 PROCEDURE — 74177 CT ABD & PELVIS W/CONTRAST: CPT | Performed by: RADIOLOGY

## 2025-08-16 PROCEDURE — 36415 COLL VENOUS BLD VENIPUNCTURE: CPT

## 2025-08-16 PROCEDURE — 74177 CT ABD & PELVIS W/CONTRAST: CPT

## 2025-08-16 PROCEDURE — 71045 X-RAY EXAM CHEST 1 VIEW: CPT

## 2025-08-16 RX ORDER — ONDANSETRON HYDROCHLORIDE 2 MG/ML
4 INJECTION, SOLUTION INTRAVENOUS ONCE
Status: COMPLETED | OUTPATIENT
Start: 2025-08-16 | End: 2025-08-16

## 2025-08-16 RX ORDER — DROPERIDOL 2.5 MG/ML
2.5 INJECTION, SOLUTION INTRAMUSCULAR; INTRAVENOUS ONCE
Status: COMPLETED | OUTPATIENT
Start: 2025-08-16 | End: 2025-08-16

## 2025-08-16 RX ORDER — IPRATROPIUM BROMIDE AND ALBUTEROL SULFATE 2.5; .5 MG/3ML; MG/3ML
3 SOLUTION RESPIRATORY (INHALATION) EVERY 2 HOUR PRN
Status: DISCONTINUED | OUTPATIENT
Start: 2025-08-16 | End: 2025-08-16

## 2025-08-16 RX ORDER — POTASSIUM CHLORIDE 14.9 MG/ML
20 INJECTION INTRAVENOUS
Status: DISCONTINUED | OUTPATIENT
Start: 2025-08-16 | End: 2025-08-16

## 2025-08-16 RX ORDER — IPRATROPIUM BROMIDE AND ALBUTEROL SULFATE 2.5; .5 MG/3ML; MG/3ML
3 SOLUTION RESPIRATORY (INHALATION) 3 TIMES DAILY
Status: DISCONTINUED | OUTPATIENT
Start: 2025-08-16 | End: 2025-08-16 | Stop reason: HOSPADM

## 2025-08-16 RX ORDER — IPRATROPIUM BROMIDE AND ALBUTEROL SULFATE 2.5; .5 MG/3ML; MG/3ML
3 SOLUTION RESPIRATORY (INHALATION)
Status: DISCONTINUED | OUTPATIENT
Start: 2025-08-16 | End: 2025-08-16

## 2025-08-16 RX ADMIN — IPRATROPIUM BROMIDE AND ALBUTEROL SULFATE 3 ML: 2.5; .5 SOLUTION RESPIRATORY (INHALATION) at 04:17

## 2025-08-16 RX ADMIN — IPRATROPIUM BROMIDE AND ALBUTEROL SULFATE 3 ML: 2.5; .5 SOLUTION RESPIRATORY (INHALATION) at 04:07

## 2025-08-16 RX ADMIN — ONDANSETRON 4 MG: 2 INJECTION, SOLUTION INTRAMUSCULAR; INTRAVENOUS at 02:52

## 2025-08-16 RX ADMIN — POTASSIUM CHLORIDE 20 MEQ: 14.9 INJECTION, SOLUTION INTRAVENOUS at 04:14

## 2025-08-16 RX ADMIN — SODIUM CHLORIDE, SODIUM LACTATE, POTASSIUM CHLORIDE, AND CALCIUM CHLORIDE 1000 ML: .6; .31; .03; .02 INJECTION, SOLUTION INTRAVENOUS at 02:49

## 2025-08-16 RX ADMIN — DROPERIDOL 2.5 MG: 2.5 INJECTION, SOLUTION INTRAMUSCULAR; INTRAVENOUS at 04:08

## 2025-08-16 RX ADMIN — IOHEXOL 75 ML: 350 INJECTION, SOLUTION INTRAVENOUS at 03:43

## 2025-08-16 ASSESSMENT — LIFESTYLE VARIABLES
EVER FELT BAD OR GUILTY ABOUT YOUR DRINKING: NO
EVER HAD A DRINK FIRST THING IN THE MORNING TO STEADY YOUR NERVES TO GET RID OF A HANGOVER: NO
HAVE YOU EVER FELT YOU SHOULD CUT DOWN ON YOUR DRINKING: NO
TOTAL SCORE: 0
HAVE PEOPLE ANNOYED YOU BY CRITICIZING YOUR DRINKING: NO

## 2025-08-16 ASSESSMENT — PAIN - FUNCTIONAL ASSESSMENT: PAIN_FUNCTIONAL_ASSESSMENT: 0-10

## 2025-08-16 ASSESSMENT — PAIN SCALES - GENERAL: PAINLEVEL_OUTOF10: 3

## 2025-08-17 LAB
ATRIAL RATE: 77 BPM
P AXIS: 59 DEGREES
P OFFSET: 191 MS
P ONSET: 133 MS
PR INTERVAL: 168 MS
Q ONSET: 217 MS
QRS COUNT: 12 BEATS
QRS DURATION: 80 MS
QT INTERVAL: 434 MS
QTC CALCULATION(BAZETT): 491 MS
QTC FREDERICIA: 471 MS
R AXIS: 53 DEGREES
T AXIS: 22 DEGREES
T OFFSET: 434 MS
VENTRICULAR RATE: 77 BPM

## 2025-08-18 DIAGNOSIS — I10 HYPERTENSION, UNSPECIFIED TYPE: Primary | ICD-10-CM

## 2025-08-18 DIAGNOSIS — E87.6 HYPOKALEMIA: Primary | ICD-10-CM

## 2025-08-18 RX ORDER — TRIAMTERENE AND HYDROCHLOROTHIAZIDE 37.5; 25 MG/1; MG/1
1 TABLET ORAL DAILY
Qty: 90 TABLET | Refills: 1 | Status: SHIPPED | OUTPATIENT
Start: 2025-08-18 | End: 2026-02-14

## 2025-08-18 RX ORDER — LOSARTAN POTASSIUM 25 MG/1
25 TABLET ORAL DAILY
Qty: 30 TABLET | Refills: 3 | Status: SHIPPED | OUTPATIENT
Start: 2025-08-18 | End: 2026-09-22

## 2025-08-19 DIAGNOSIS — E87.6 HYPOKALEMIA: ICD-10-CM

## 2025-08-20 ENCOUNTER — EVALUATION (OUTPATIENT)
Dept: OCCUPATIONAL THERAPY | Facility: CLINIC | Age: 50
End: 2025-08-20
Payer: COMMERCIAL

## 2025-08-20 DIAGNOSIS — M79.671 BILATERAL LEG AND FOOT PAIN: ICD-10-CM

## 2025-08-20 DIAGNOSIS — M25.662 JOINT STIFFNESS OF BOTH KNEES: ICD-10-CM

## 2025-08-20 DIAGNOSIS — I89.0 LYMPHEDEMA OF BOTH LOWER EXTREMITIES: Primary | ICD-10-CM

## 2025-08-20 DIAGNOSIS — M79.672 BILATERAL LEG AND FOOT PAIN: ICD-10-CM

## 2025-08-20 DIAGNOSIS — M79.605 BILATERAL LEG AND FOOT PAIN: ICD-10-CM

## 2025-08-20 DIAGNOSIS — I89.0 LYMPHEDEMA OF BOTH UPPER EXTREMITIES: ICD-10-CM

## 2025-08-20 DIAGNOSIS — M25.661 JOINT STIFFNESS OF BOTH KNEES: ICD-10-CM

## 2025-08-20 DIAGNOSIS — M25.672 JOINT STIFFNESS OF BOTH ANKLES: ICD-10-CM

## 2025-08-20 DIAGNOSIS — M62.81 MUSCLE WEAKNESS (GENERALIZED): ICD-10-CM

## 2025-08-20 DIAGNOSIS — M25.671 JOINT STIFFNESS OF BOTH ANKLES: ICD-10-CM

## 2025-08-20 DIAGNOSIS — M79.604 BILATERAL LEG AND FOOT PAIN: ICD-10-CM

## 2025-08-20 PROCEDURE — 97166 OT EVAL MOD COMPLEX 45 MIN: CPT | Mod: GO

## 2025-09-02 ENCOUNTER — TREATMENT (OUTPATIENT)
Dept: OCCUPATIONAL THERAPY | Facility: CLINIC | Age: 50
End: 2025-09-02
Payer: COMMERCIAL

## 2025-09-02 DIAGNOSIS — M25.661 JOINT STIFFNESS OF BOTH KNEES: ICD-10-CM

## 2025-09-02 DIAGNOSIS — M25.671 JOINT STIFFNESS OF BOTH ANKLES: ICD-10-CM

## 2025-09-02 DIAGNOSIS — M79.672 BILATERAL LEG AND FOOT PAIN: ICD-10-CM

## 2025-09-02 DIAGNOSIS — M25.672 JOINT STIFFNESS OF BOTH ANKLES: ICD-10-CM

## 2025-09-02 DIAGNOSIS — M79.671 BILATERAL LEG AND FOOT PAIN: ICD-10-CM

## 2025-09-02 DIAGNOSIS — I89.0 LYMPHEDEMA OF BOTH LOWER EXTREMITIES: Primary | ICD-10-CM

## 2025-09-02 DIAGNOSIS — M79.605 BILATERAL LEG AND FOOT PAIN: ICD-10-CM

## 2025-09-02 DIAGNOSIS — M79.604 BILATERAL LEG AND FOOT PAIN: ICD-10-CM

## 2025-09-02 DIAGNOSIS — M62.81 MUSCLE WEAKNESS (GENERALIZED): ICD-10-CM

## 2025-09-02 DIAGNOSIS — M25.662 JOINT STIFFNESS OF BOTH KNEES: ICD-10-CM

## 2025-09-02 DIAGNOSIS — I89.0 LYMPHEDEMA OF BOTH UPPER EXTREMITIES: ICD-10-CM

## 2025-09-02 PROCEDURE — 97535 SELF CARE MNGMENT TRAINING: CPT | Mod: GO | Performed by: OCCUPATIONAL THERAPIST

## 2025-09-02 ASSESSMENT — ACTIVITIES OF DAILY LIVING (ADL): HOME_MANAGEMENT_TIME_ENTRY: 54

## 2025-11-07 ENCOUNTER — APPOINTMENT (OUTPATIENT)
Dept: PRIMARY CARE | Facility: CLINIC | Age: 50
End: 2025-11-07
Payer: COMMERCIAL

## 2026-01-07 ENCOUNTER — APPOINTMENT (OUTPATIENT)
Dept: OBSTETRICS AND GYNECOLOGY | Facility: CLINIC | Age: 51
End: 2026-01-07
Payer: COMMERCIAL

## 2026-01-08 ENCOUNTER — APPOINTMENT (OUTPATIENT)
Dept: OBSTETRICS AND GYNECOLOGY | Facility: CLINIC | Age: 51
End: 2026-01-08
Payer: COMMERCIAL